# Patient Record
Sex: FEMALE | Race: BLACK OR AFRICAN AMERICAN | Employment: UNEMPLOYED | URBAN - METROPOLITAN AREA
[De-identification: names, ages, dates, MRNs, and addresses within clinical notes are randomized per-mention and may not be internally consistent; named-entity substitution may affect disease eponyms.]

---

## 2018-12-22 ENCOUNTER — HOSPITAL ENCOUNTER (EMERGENCY)
Age: 28
Discharge: HOME | End: 2018-12-22
Attending: EMERGENCY MEDICINE
Payer: MEDICAID

## 2018-12-22 ENCOUNTER — APPOINTMENT (OUTPATIENT)
Dept: GENERAL RADIOLOGY | Age: 28
End: 2018-12-22
Payer: MEDICAID

## 2018-12-22 VITALS
DIASTOLIC BLOOD PRESSURE: 85 MMHG | WEIGHT: 195 LBS | BODY MASS INDEX: 33.29 KG/M2 | HEART RATE: 102 BPM | SYSTOLIC BLOOD PRESSURE: 140 MMHG | HEIGHT: 64 IN | RESPIRATION RATE: 18 BRPM | TEMPERATURE: 99.1 F | OXYGEN SATURATION: 100 %

## 2018-12-22 DIAGNOSIS — T07.XXXA MULTIPLE CONTUSIONS: ICD-10-CM
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
## 2018-12-22 PROCEDURE — 99283 EMERGENCY DEPT VISIT LOW MDM: CPT

## 2018-12-22 PROCEDURE — 6360000002 HC RX W HCPCS: Performed by: EMERGENCY MEDICINE

## 2018-12-22 PROCEDURE — 96372 THER/PROPH/DIAG INJ SC/IM: CPT

## 2018-12-22 PROCEDURE — 6370000000 HC RX 637 (ALT 250 FOR IP): Performed by: EMERGENCY MEDICINE

## 2018-12-22 PROCEDURE — 70150 X-RAY EXAM OF FACIAL BONES: CPT

## 2018-12-22 RX ORDER — ORPHENADRINE CITRATE 30 MG/ML
60 INJECTION INTRAMUSCULAR; INTRAVENOUS ONCE
Status: COMPLETED | OUTPATIENT
Start: 2018-12-22 | End: 2018-12-22

## 2018-12-22 RX ORDER — IBUPROFEN 800 MG/1
800 TABLET ORAL 3 TIMES DAILY PRN
Qty: 21 TABLET | Refills: 1 | Status: SHIPPED | OUTPATIENT
Start: 2018-12-22 | End: 2019-11-22

## 2018-12-22 RX ORDER — HYDROCODONE BITARTRATE AND ACETAMINOPHEN 5; 325 MG/1; MG/1
1 TABLET ORAL EVERY 8 HOURS PRN
Qty: 6 TABLET | Refills: 0 | Status: SHIPPED | OUTPATIENT
Start: 2018-12-22 | End: 2018-12-24

## 2018-12-22 RX ORDER — METHOCARBAMOL 500 MG/1
500 TABLET, FILM COATED ORAL 3 TIMES DAILY
Qty: 21 TABLET | Refills: 0 | Status: SHIPPED | OUTPATIENT
Start: 2018-12-22 | End: 2018-12-29

## 2018-12-22 RX ORDER — HYDROCODONE BITARTRATE AND ACETAMINOPHEN 5; 325 MG/1; MG/1
1 TABLET ORAL ONCE
Status: COMPLETED | OUTPATIENT
Start: 2018-12-22 | End: 2018-12-22

## 2018-12-22 RX ADMIN — ORPHENADRINE CITRATE 60 MG: 30 INJECTION INTRAMUSCULAR; INTRAVENOUS at 14:50

## 2018-12-22 RX ADMIN — HYDROCODONE BITARTRATE AND ACETAMINOPHEN 1 TABLET: 5; 325 TABLET ORAL at 14:48

## 2018-12-22 ASSESSMENT — PAIN DESCRIPTION - PAIN TYPE: TYPE: ACUTE PAIN

## 2018-12-22 ASSESSMENT — PAIN DESCRIPTION - PROGRESSION
CLINICAL_PROGRESSION: NOT CHANGED
CLINICAL_PROGRESSION: GRADUALLY IMPROVING

## 2018-12-22 ASSESSMENT — PAIN SCALES - GENERAL
PAINLEVEL_OUTOF10: 7
PAINLEVEL_OUTOF10: 8
PAINLEVEL_OUTOF10: 8

## 2018-12-22 ASSESSMENT — ENCOUNTER SYMPTOMS
ABDOMINAL PAIN: 0
BACK PAIN: 1
SORE THROAT: 0
COUGH: 0
VOMITING: 0
NAUSEA: 0
DIARRHEA: 0
CONSTIPATION: 0
BLOOD IN STOOL: 0
RHINORRHEA: 0
COLOR CHANGE: 0
SHORTNESS OF BREATH: 0

## 2018-12-22 ASSESSMENT — PAIN DESCRIPTION - FREQUENCY: FREQUENCY: CONTINUOUS

## 2018-12-22 ASSESSMENT — PAIN DESCRIPTION - LOCATION: LOCATION: GENERALIZED

## 2018-12-22 ASSESSMENT — PAIN DESCRIPTION - DESCRIPTORS: DESCRIPTORS: ACHING;SORE
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
## 2019-03-06 ENCOUNTER — HOSPITAL ENCOUNTER (EMERGENCY)
Age: 29
Discharge: HOME | End: 2019-03-06
Attending: EMERGENCY MEDICINE
Payer: MEDICAID

## 2019-03-06 VITALS
OXYGEN SATURATION: 97 % | SYSTOLIC BLOOD PRESSURE: 128 MMHG | HEART RATE: 98 BPM | TEMPERATURE: 98.8 F | DIASTOLIC BLOOD PRESSURE: 82 MMHG | RESPIRATION RATE: 14 BRPM

## 2019-03-06 DIAGNOSIS — T50.901A ACCIDENTAL DRUG OVERDOSE, INITIAL ENCOUNTER: Primary | ICD-10-CM

## 2019-03-06 DIAGNOSIS — F19.10 POLYSUBSTANCE ABUSE (HCC): ICD-10-CM

## 2019-03-06 LAB — GONADOTROPIN, CHORIONIC (HCG) QUANT: <0.1 MIU/ML

## 2019-03-06 PROCEDURE — 99284 EMERGENCY DEPT VISIT MOD MDM: CPT

## 2019-03-06 PROCEDURE — 84702 CHORIONIC GONADOTROPIN TEST: CPT

## 2019-03-06 PROCEDURE — 36415 COLL VENOUS BLD VENIPUNCTURE: CPT

## 2019-11-22 ENCOUNTER — ANESTHESIA EVENT (OUTPATIENT)
Dept: MEDSURG UNIT | Age: 29
End: 2019-11-22

## 2019-11-22 ENCOUNTER — APPOINTMENT (OUTPATIENT)
Dept: GENERAL RADIOLOGY | Age: 29
DRG: 383 | End: 2019-11-22
Payer: MEDICAID

## 2019-11-22 ENCOUNTER — HOSPITAL ENCOUNTER (INPATIENT)
Age: 29
LOS: 3 days | Discharge: HOME | DRG: 383 | End: 2019-11-25
Attending: EMERGENCY MEDICINE | Admitting: INTERNAL MEDICINE
Payer: MEDICAID

## 2019-11-22 ENCOUNTER — ANESTHESIA (OUTPATIENT)
Dept: MEDSURG UNIT | Age: 29
End: 2019-11-22

## 2019-11-22 DIAGNOSIS — L03.119 CELLULITIS OF HAND: Primary | ICD-10-CM

## 2019-11-22 DIAGNOSIS — M65.9 SYNOVITIS AND TENOSYNOVITIS: ICD-10-CM

## 2019-11-22 DIAGNOSIS — M65.9 TENOSYNOVITIS: ICD-10-CM

## 2019-11-22 PROBLEM — M65.90 TENOSYNOVITIS: Status: ACTIVE | Noted: 2019-11-22

## 2019-11-22 LAB
ABO/RH: NORMAL
ANION GAP SERPL CALCULATED.3IONS-SCNC: 11 MMOL/L (ref 7–16)
ANTIBODY SCREEN: NORMAL
BASOPHILS ABSOLUTE: 0.02 E9/L (ref 0–0.2)
BASOPHILS RELATIVE PERCENT: 0.3 % (ref 0–2)
BUN BLDV-MCNC: 13 MG/DL (ref 6–20)
CALCIUM SERPL-MCNC: 9.5 MG/DL (ref 8.6–10.2)
CHLORIDE BLD-SCNC: 101 MMOL/L (ref 98–107)
CO2: 26 MMOL/L (ref 22–29)
CREAT SERPL-MCNC: 0.8 MG/DL (ref 0.5–1)
EOSINOPHILS ABSOLUTE: 0.2 E9/L (ref 0.05–0.5)
EOSINOPHILS RELATIVE PERCENT: 3 % (ref 0–6)
GFR AFRICAN AMERICAN: >60
GFR NON-AFRICAN AMERICAN: >60 ML/MIN/1.73
GLUCOSE BLD-MCNC: 102 MG/DL (ref 74–99)
HCT VFR BLD CALC: 33.3 % (ref 34–48)
HEMOGLOBIN: 10.9 G/DL (ref 11.5–15.5)
IMMATURE GRANULOCYTES #: 0.01 E9/L
IMMATURE GRANULOCYTES %: 0.1 % (ref 0–5)
LYMPHOCYTES ABSOLUTE: 2.42 E9/L (ref 1.5–4)
LYMPHOCYTES RELATIVE PERCENT: 35.8 % (ref 20–42)
MCH RBC QN AUTO: 29.3 PG (ref 26–35)
MCHC RBC AUTO-ENTMCNC: 32.7 % (ref 32–34.5)
MCV RBC AUTO: 89.5 FL (ref 80–99.9)
MONOCYTES ABSOLUTE: 0.65 E9/L (ref 0.1–0.95)
MONOCYTES RELATIVE PERCENT: 9.6 % (ref 2–12)
NEUTROPHILS ABSOLUTE: 3.46 E9/L (ref 1.8–7.3)
NEUTROPHILS RELATIVE PERCENT: 51.2 % (ref 43–80)
PDW BLD-RTO: 15.9 FL (ref 11.5–15)
PLATELET # BLD: 289 E9/L (ref 130–450)
PMV BLD AUTO: 9.5 FL (ref 7–12)
POTASSIUM SERPL-SCNC: 4.5 MMOL/L (ref 3.5–5)
RBC # BLD: 3.72 E12/L (ref 3.5–5.5)
SEDIMENTATION RATE, ERYTHROCYTE: 43 MM/HR (ref 0–20)
SODIUM BLD-SCNC: 138 MMOL/L (ref 132–146)
WBC # BLD: 6.8 E9/L (ref 4.5–11.5)

## 2019-11-22 PROCEDURE — 85025 COMPLETE CBC W/AUTO DIFF WBC: CPT

## 2019-11-22 PROCEDURE — 6360000002 HC RX W HCPCS: Performed by: PHYSICIAN ASSISTANT

## 2019-11-22 PROCEDURE — 6370000000 HC RX 637 (ALT 250 FOR IP): Performed by: INTERNAL MEDICINE

## 2019-11-22 PROCEDURE — 80048 BASIC METABOLIC PNL TOTAL CA: CPT

## 2019-11-22 PROCEDURE — 2580000003 HC RX 258: Performed by: PHYSICIAN ASSISTANT

## 2019-11-22 PROCEDURE — 6360000002 HC RX W HCPCS: Performed by: INTERNAL MEDICINE

## 2019-11-22 PROCEDURE — 71045 X-RAY EXAM CHEST 1 VIEW: CPT

## 2019-11-22 PROCEDURE — 6370000000 HC RX 637 (ALT 250 FOR IP): Performed by: SPECIALIST

## 2019-11-22 PROCEDURE — 73130 X-RAY EXAM OF HAND: CPT

## 2019-11-22 PROCEDURE — 99285 EMERGENCY DEPT VISIT HI MDM: CPT

## 2019-11-22 PROCEDURE — 6370000000 HC RX 637 (ALT 250 FOR IP): Performed by: PHYSICIAN ASSISTANT

## 2019-11-22 PROCEDURE — 86850 RBC ANTIBODY SCREEN: CPT

## 2019-11-22 PROCEDURE — 86901 BLOOD TYPING SEROLOGIC RH(D): CPT

## 2019-11-22 PROCEDURE — 85651 RBC SED RATE NONAUTOMATED: CPT

## 2019-11-22 PROCEDURE — 86900 BLOOD TYPING SEROLOGIC ABO: CPT

## 2019-11-22 PROCEDURE — 36415 COLL VENOUS BLD VENIPUNCTURE: CPT

## 2019-11-22 PROCEDURE — 2580000003 HC RX 258: Performed by: INTERNAL MEDICINE

## 2019-11-22 PROCEDURE — 87081 CULTURE SCREEN ONLY: CPT

## 2019-11-22 PROCEDURE — 96374 THER/PROPH/DIAG INJ IV PUSH: CPT

## 2019-11-22 PROCEDURE — 1200000000 HC SEMI PRIVATE

## 2019-11-22 RX ORDER — HYDROCODONE BITARTRATE AND ACETAMINOPHEN 7.5; 325 MG/1; MG/1
1 TABLET ORAL EVERY 4 HOURS PRN
Status: DISCONTINUED | OUTPATIENT
Start: 2019-11-22 | End: 2019-11-25 | Stop reason: HOSPADM

## 2019-11-22 RX ORDER — ACETAMINOPHEN 325 MG/1
650 TABLET ORAL EVERY 4 HOURS PRN
Status: DISCONTINUED | OUTPATIENT
Start: 2019-11-22 | End: 2019-11-22

## 2019-11-22 RX ORDER — NICOTINE 21 MG/24HR
1 PATCH, TRANSDERMAL 24 HOURS TRANSDERMAL DAILY
Status: DISCONTINUED | OUTPATIENT
Start: 2019-11-22 | End: 2019-11-22

## 2019-11-22 RX ORDER — LINEZOLID 600 MG/1
600 TABLET, FILM COATED ORAL EVERY 12 HOURS SCHEDULED
Status: DISCONTINUED | OUTPATIENT
Start: 2019-11-22 | End: 2019-11-25 | Stop reason: HOSPADM

## 2019-11-22 RX ORDER — SODIUM CHLORIDE 0.9 % (FLUSH) 0.9 %
10 SYRINGE (ML) INJECTION PRN
Status: DISCONTINUED | OUTPATIENT
Start: 2019-11-22 | End: 2019-11-25 | Stop reason: HOSPADM

## 2019-11-22 RX ORDER — SODIUM CHLORIDE 0.9 % (FLUSH) 0.9 %
10 SYRINGE (ML) INJECTION EVERY 12 HOURS SCHEDULED
Status: DISCONTINUED | OUTPATIENT
Start: 2019-11-22 | End: 2019-11-25 | Stop reason: HOSPADM

## 2019-11-22 RX ORDER — KETOROLAC TROMETHAMINE 30 MG/ML
30 INJECTION, SOLUTION INTRAMUSCULAR; INTRAVENOUS EVERY 6 HOURS PRN
Status: DISCONTINUED | OUTPATIENT
Start: 2019-11-22 | End: 2019-11-25 | Stop reason: HOSPADM

## 2019-11-22 RX ORDER — ONDANSETRON 2 MG/ML
4 INJECTION INTRAMUSCULAR; INTRAVENOUS EVERY 6 HOURS PRN
Status: DISCONTINUED | OUTPATIENT
Start: 2019-11-22 | End: 2019-11-25 | Stop reason: HOSPADM

## 2019-11-22 RX ORDER — ACETAMINOPHEN 325 MG/1
650 TABLET ORAL EVERY 4 HOURS PRN
Status: DISCONTINUED | OUTPATIENT
Start: 2019-11-22 | End: 2019-11-25 | Stop reason: HOSPADM

## 2019-11-22 RX ORDER — ONDANSETRON 2 MG/ML
4 INJECTION INTRAMUSCULAR; INTRAVENOUS EVERY 6 HOURS PRN
Status: DISCONTINUED | OUTPATIENT
Start: 2019-11-22 | End: 2019-11-22 | Stop reason: SDUPTHER

## 2019-11-22 RX ORDER — SODIUM CHLORIDE 0.9 % (FLUSH) 0.9 %
10 SYRINGE (ML) INJECTION PRN
Status: DISCONTINUED | OUTPATIENT
Start: 2019-11-22 | End: 2019-11-22

## 2019-11-22 RX ORDER — SODIUM CHLORIDE 0.9 % (FLUSH) 0.9 %
10 SYRINGE (ML) INJECTION EVERY 12 HOURS SCHEDULED
Status: DISCONTINUED | OUTPATIENT
Start: 2019-11-22 | End: 2019-11-22

## 2019-11-22 RX ORDER — HYDROCODONE BITARTRATE AND ACETAMINOPHEN 5; 325 MG/1; MG/1
1 TABLET ORAL ONCE
Status: COMPLETED | OUTPATIENT
Start: 2019-11-22 | End: 2019-11-22

## 2019-11-22 RX ORDER — KETOROLAC TROMETHAMINE 30 MG/ML
30 INJECTION, SOLUTION INTRAMUSCULAR; INTRAVENOUS ONCE
Status: COMPLETED | OUTPATIENT
Start: 2019-11-22 | End: 2019-11-22

## 2019-11-22 RX ADMIN — KETOROLAC TROMETHAMINE 30 MG: 30 INJECTION, SOLUTION INTRAMUSCULAR; INTRAVENOUS at 15:55

## 2019-11-22 RX ADMIN — ONDANSETRON 4 MG: 2 INJECTION INTRAMUSCULAR; INTRAVENOUS at 08:36

## 2019-11-22 RX ADMIN — ACETAMINOPHEN 650 MG: 325 TABLET, FILM COATED ORAL at 21:25

## 2019-11-22 RX ADMIN — ACETAMINOPHEN 650 MG: 325 TABLET, FILM COATED ORAL at 04:25

## 2019-11-22 RX ADMIN — KETOROLAC TROMETHAMINE 30 MG: 30 INJECTION, SOLUTION INTRAMUSCULAR; INTRAVENOUS at 22:14

## 2019-11-22 RX ADMIN — Medication 10 ML: at 08:43

## 2019-11-22 RX ADMIN — HYDROCODONE BITARTRATE AND ACETAMINOPHEN 1 TABLET: 7.5; 325 TABLET ORAL at 17:53

## 2019-11-22 RX ADMIN — VANCOMYCIN HYDROCHLORIDE 1500 MG: 5 INJECTION, POWDER, LYOPHILIZED, FOR SOLUTION INTRAVENOUS at 08:01

## 2019-11-22 RX ADMIN — HYDROCODONE BITARTRATE AND ACETAMINOPHEN 1 TABLET: 7.5; 325 TABLET ORAL at 23:42

## 2019-11-22 RX ADMIN — KETOROLAC TROMETHAMINE 30 MG: 30 INJECTION, SOLUTION INTRAMUSCULAR; INTRAVENOUS at 01:39

## 2019-11-22 RX ADMIN — LINEZOLID 600 MG: 600 TABLET, FILM COATED ORAL at 21:25

## 2019-11-22 RX ADMIN — ONDANSETRON 4 MG: 2 INJECTION INTRAMUSCULAR; INTRAVENOUS at 21:25

## 2019-11-22 RX ADMIN — KETOROLAC TROMETHAMINE 30 MG: 30 INJECTION, SOLUTION INTRAMUSCULAR; INTRAVENOUS at 07:55

## 2019-11-22 RX ADMIN — HYDROCODONE BITARTRATE AND ACETAMINOPHEN 1 TABLET: 7.5; 325 TABLET ORAL at 13:15

## 2019-11-22 RX ADMIN — LINEZOLID 600 MG: 600 TABLET, FILM COATED ORAL at 11:39

## 2019-11-22 RX ADMIN — Medication 10 ML: at 22:00

## 2019-11-22 RX ADMIN — HYDROCODONE BITARTRATE AND ACETAMINOPHEN 1 TABLET: 5; 325 TABLET ORAL at 02:18

## 2019-11-22 RX ADMIN — HYDROCODONE BITARTRATE AND ACETAMINOPHEN 1 TABLET: 7.5; 325 TABLET ORAL at 08:31

## 2019-11-22 ASSESSMENT — PAIN SCALES - GENERAL
PAINLEVEL_OUTOF10: 8
PAINLEVEL_OUTOF10: 10
PAINLEVEL_OUTOF10: 1
PAINLEVEL_OUTOF10: 10
PAINLEVEL_OUTOF10: 6
PAINLEVEL_OUTOF10: 10
PAINLEVEL_OUTOF10: 7
PAINLEVEL_OUTOF10: 9
PAINLEVEL_OUTOF10: 10
PAINLEVEL_OUTOF10: 9
PAINLEVEL_OUTOF10: 8
PAINLEVEL_OUTOF10: 10
PAINLEVEL_OUTOF10: 10

## 2019-11-22 ASSESSMENT — PAIN DESCRIPTION - PROGRESSION
CLINICAL_PROGRESSION: GRADUALLY WORSENING
CLINICAL_PROGRESSION: GRADUALLY WORSENING

## 2019-11-22 ASSESSMENT — PAIN DESCRIPTION - DESCRIPTORS
DESCRIPTORS: SHARP;SHOOTING;STABBING
DESCRIPTORS: BURNING;RADIATING;SHARP

## 2019-11-22 ASSESSMENT — PAIN DESCRIPTION - ORIENTATION
ORIENTATION: RIGHT;LEFT

## 2019-11-22 ASSESSMENT — PAIN DESCRIPTION - LOCATION
LOCATION: HAND
LOCATION: ARM;FINGER (COMMENT WHICH ONE)
LOCATION: HAND

## 2019-11-22 ASSESSMENT — PAIN DESCRIPTION - PAIN TYPE
TYPE: ACUTE PAIN
TYPE: ACUTE PAIN

## 2019-11-22 ASSESSMENT — LIFESTYLE VARIABLES: SMOKING_STATUS: 1

## 2019-11-22 ASSESSMENT — PAIN DESCRIPTION - FREQUENCY
FREQUENCY: CONTINUOUS
FREQUENCY: CONTINUOUS

## 2019-11-22 ASSESSMENT — PAIN DESCRIPTION - ONSET
ONSET: ON-GOING
ONSET: ON-GOING

## 2019-11-22 ASSESSMENT — PAIN - FUNCTIONAL ASSESSMENT: PAIN_FUNCTIONAL_ASSESSMENT: PREVENTS OR INTERFERES SOME ACTIVE ACTIVITIES AND ADLS

## 2019-11-23 LAB
ALBUMIN SERPL-MCNC: 3.3 G/DL (ref 3.5–5.2)
ALP BLD-CCNC: 65 U/L (ref 35–104)
ALT SERPL-CCNC: 13 U/L (ref 0–32)
ANION GAP SERPL CALCULATED.3IONS-SCNC: 10 MMOL/L (ref 7–16)
ANISOCYTOSIS: ABNORMAL
ANTISTREPTOLYSIN-O: 424 IU/ML (ref 0–200)
APTT: 34.8 SEC (ref 24.5–35.1)
AST SERPL-CCNC: 18 U/L (ref 0–31)
ATYPICAL LYMPHOCYTE RELATIVE PERCENT: 0.9 % (ref 0–4)
BASOPHILS ABSOLUTE: 0 E9/L (ref 0–0.2)
BASOPHILS RELATIVE PERCENT: 0.2 % (ref 0–2)
BILIRUB SERPL-MCNC: <0.2 MG/DL (ref 0–1.2)
BUN BLDV-MCNC: 16 MG/DL (ref 6–20)
BURR CELLS: ABNORMAL
CALCIUM SERPL-MCNC: 8.8 MG/DL (ref 8.6–10.2)
CHLORIDE BLD-SCNC: 104 MMOL/L (ref 98–107)
CHOLESTEROL, TOTAL: 87 MG/DL (ref 0–199)
CO2: 24 MMOL/L (ref 22–29)
CREAT SERPL-MCNC: 0.9 MG/DL (ref 0.5–1)
EOSINOPHILS ABSOLUTE: 0.2 E9/L (ref 0.05–0.5)
EOSINOPHILS RELATIVE PERCENT: 3.5 % (ref 0–6)
GFR AFRICAN AMERICAN: >60
GFR NON-AFRICAN AMERICAN: >60 ML/MIN/1.73
GLUCOSE BLD-MCNC: 109 MG/DL (ref 74–99)
HCT VFR BLD CALC: 30.8 % (ref 34–48)
HDLC SERPL-MCNC: 40 MG/DL
HEMOGLOBIN: 10.1 G/DL (ref 11.5–15.5)
HYPOCHROMIA: ABNORMAL
INR BLD: 1.1
LDL CHOLESTEROL CALCULATED: 35 MG/DL (ref 0–99)
LYMPHOCYTES ABSOLUTE: 1.94 E9/L (ref 1.5–4)
LYMPHOCYTES RELATIVE PERCENT: 32.7 % (ref 20–42)
MCH RBC QN AUTO: 29.6 PG (ref 26–35)
MCHC RBC AUTO-ENTMCNC: 32.8 % (ref 32–34.5)
MCV RBC AUTO: 90.3 FL (ref 80–99.9)
MONOCYTES ABSOLUTE: 0.51 E9/L (ref 0.1–0.95)
MONOCYTES RELATIVE PERCENT: 8.8 % (ref 2–12)
NEUTROPHILS ABSOLUTE: 3.08 E9/L (ref 1.8–7.3)
NEUTROPHILS RELATIVE PERCENT: 54 % (ref 43–80)
OVALOCYTES: ABNORMAL
PDW BLD-RTO: 16 FL (ref 11.5–15)
PLATELET # BLD: 257 E9/L (ref 130–450)
PMV BLD AUTO: 9.6 FL (ref 7–12)
POIKILOCYTES: ABNORMAL
POLYCHROMASIA: ABNORMAL
POTASSIUM SERPL-SCNC: 4.4 MMOL/L (ref 3.5–5)
PROTHROMBIN TIME: 12.9 SEC (ref 9.3–12.4)
RBC # BLD: 3.41 E12/L (ref 3.5–5.5)
SCHISTOCYTES: ABNORMAL
SODIUM BLD-SCNC: 138 MMOL/L (ref 132–146)
TARGET CELLS: ABNORMAL
TOTAL PROTEIN: 6.6 G/DL (ref 6.4–8.3)
TRIGL SERPL-MCNC: 58 MG/DL (ref 0–149)
TSH SERPL DL<=0.05 MIU/L-ACNC: 0.35 UIU/ML (ref 0.27–4.2)
VLDLC SERPL CALC-MCNC: 12 MG/DL
WBC # BLD: 5.7 E9/L (ref 4.5–11.5)

## 2019-11-23 PROCEDURE — 6360000002 HC RX W HCPCS: Performed by: STUDENT IN AN ORGANIZED HEALTH CARE EDUCATION/TRAINING PROGRAM

## 2019-11-23 PROCEDURE — 6360000002 HC RX W HCPCS: Performed by: INTERNAL MEDICINE

## 2019-11-23 PROCEDURE — 86060 ANTISTREPTOLYSIN O TITER: CPT

## 2019-11-23 PROCEDURE — 85025 COMPLETE CBC W/AUTO DIFF WBC: CPT

## 2019-11-23 PROCEDURE — 85610 PROTHROMBIN TIME: CPT

## 2019-11-23 PROCEDURE — 1200000000 HC SEMI PRIVATE

## 2019-11-23 PROCEDURE — 6370000000 HC RX 637 (ALT 250 FOR IP): Performed by: SPECIALIST

## 2019-11-23 PROCEDURE — 85730 THROMBOPLASTIN TIME PARTIAL: CPT

## 2019-11-23 PROCEDURE — 6370000000 HC RX 637 (ALT 250 FOR IP): Performed by: INTERNAL MEDICINE

## 2019-11-23 PROCEDURE — 2580000003 HC RX 258: Performed by: INTERNAL MEDICINE

## 2019-11-23 PROCEDURE — 80061 LIPID PANEL: CPT

## 2019-11-23 PROCEDURE — 36415 COLL VENOUS BLD VENIPUNCTURE: CPT

## 2019-11-23 PROCEDURE — 84443 ASSAY THYROID STIM HORMONE: CPT

## 2019-11-23 PROCEDURE — 80053 COMPREHEN METABOLIC PANEL: CPT

## 2019-11-23 PROCEDURE — 2580000003 HC RX 258: Performed by: STUDENT IN AN ORGANIZED HEALTH CARE EDUCATION/TRAINING PROGRAM

## 2019-11-23 PROCEDURE — 93005 ELECTROCARDIOGRAM TRACING: CPT

## 2019-11-23 RX ADMIN — LINEZOLID 600 MG: 600 TABLET, FILM COATED ORAL at 20:23

## 2019-11-23 RX ADMIN — KETOROLAC TROMETHAMINE 30 MG: 30 INJECTION, SOLUTION INTRAMUSCULAR; INTRAVENOUS at 15:38

## 2019-11-23 RX ADMIN — Medication 10 ML: at 15:38

## 2019-11-23 RX ADMIN — KETOROLAC TROMETHAMINE 30 MG: 30 INJECTION, SOLUTION INTRAMUSCULAR; INTRAVENOUS at 06:23

## 2019-11-23 RX ADMIN — HYDROCODONE BITARTRATE AND ACETAMINOPHEN 1 TABLET: 7.5; 325 TABLET ORAL at 19:28

## 2019-11-23 RX ADMIN — LINEZOLID 600 MG: 600 TABLET, FILM COATED ORAL at 10:04

## 2019-11-23 RX ADMIN — HYDROCODONE BITARTRATE AND ACETAMINOPHEN 1 TABLET: 7.5; 325 TABLET ORAL at 14:12

## 2019-11-23 RX ADMIN — AMPICILLIN SODIUM AND SULBACTAM SODIUM 3 G: 1; .5 INJECTION, POWDER, FOR SOLUTION INTRAMUSCULAR; INTRAVENOUS at 17:40

## 2019-11-23 RX ADMIN — HYDROCODONE BITARTRATE AND ACETAMINOPHEN 1 TABLET: 7.5; 325 TABLET ORAL at 08:38

## 2019-11-23 RX ADMIN — AMPICILLIN SODIUM AND SULBACTAM SODIUM 3 G: 1; .5 INJECTION, POWDER, FOR SOLUTION INTRAMUSCULAR; INTRAVENOUS at 11:56

## 2019-11-23 ASSESSMENT — PAIN DESCRIPTION - ORIENTATION: ORIENTATION: RIGHT;LEFT

## 2019-11-23 ASSESSMENT — PAIN SCALES - GENERAL
PAINLEVEL_OUTOF10: 10
PAINLEVEL_OUTOF10: 8
PAINLEVEL_OUTOF10: 7
PAINLEVEL_OUTOF10: 9
PAINLEVEL_OUTOF10: 7
PAINLEVEL_OUTOF10: 7
PAINLEVEL_OUTOF10: 10

## 2019-11-23 ASSESSMENT — PAIN DESCRIPTION - PAIN TYPE: TYPE: ACUTE PAIN

## 2019-11-23 ASSESSMENT — PAIN DESCRIPTION - LOCATION: LOCATION: FINGER (COMMENT WHICH ONE);HAND

## 2019-11-23 ASSESSMENT — PAIN DESCRIPTION - DESCRIPTORS: DESCRIPTORS: THROBBING

## 2019-11-24 LAB — MRSA CULTURE ONLY: NORMAL

## 2019-11-24 PROCEDURE — 6370000000 HC RX 637 (ALT 250 FOR IP): Performed by: SPECIALIST

## 2019-11-24 PROCEDURE — 1200000000 HC SEMI PRIVATE

## 2019-11-24 PROCEDURE — 6360000002 HC RX W HCPCS: Performed by: INTERNAL MEDICINE

## 2019-11-24 PROCEDURE — 6370000000 HC RX 637 (ALT 250 FOR IP): Performed by: INTERNAL MEDICINE

## 2019-11-24 PROCEDURE — 2580000003 HC RX 258: Performed by: INTERNAL MEDICINE

## 2019-11-24 PROCEDURE — 2580000003 HC RX 258: Performed by: STUDENT IN AN ORGANIZED HEALTH CARE EDUCATION/TRAINING PROGRAM

## 2019-11-24 PROCEDURE — 6360000002 HC RX W HCPCS: Performed by: STUDENT IN AN ORGANIZED HEALTH CARE EDUCATION/TRAINING PROGRAM

## 2019-11-24 RX ORDER — NICOTINE 21 MG/24HR
1 PATCH, TRANSDERMAL 24 HOURS TRANSDERMAL DAILY
Status: DISCONTINUED | OUTPATIENT
Start: 2019-11-24 | End: 2019-11-25 | Stop reason: HOSPADM

## 2019-11-24 RX ADMIN — LINEZOLID 600 MG: 600 TABLET, FILM COATED ORAL at 21:46

## 2019-11-24 RX ADMIN — KETOROLAC TROMETHAMINE 30 MG: 30 INJECTION, SOLUTION INTRAMUSCULAR; INTRAVENOUS at 19:05

## 2019-11-24 RX ADMIN — AMPICILLIN SODIUM AND SULBACTAM SODIUM 3 G: 1; .5 INJECTION, POWDER, FOR SOLUTION INTRAMUSCULAR; INTRAVENOUS at 00:16

## 2019-11-24 RX ADMIN — LINEZOLID 600 MG: 600 TABLET, FILM COATED ORAL at 10:16

## 2019-11-24 RX ADMIN — AMPICILLIN SODIUM AND SULBACTAM SODIUM 3 G: 1; .5 INJECTION, POWDER, FOR SOLUTION INTRAMUSCULAR; INTRAVENOUS at 14:22

## 2019-11-24 RX ADMIN — Medication 10 ML: at 01:37

## 2019-11-24 RX ADMIN — AMPICILLIN SODIUM AND SULBACTAM SODIUM 3 G: 1; .5 INJECTION, POWDER, FOR SOLUTION INTRAMUSCULAR; INTRAVENOUS at 06:10

## 2019-11-24 RX ADMIN — HYDROCODONE BITARTRATE AND ACETAMINOPHEN 1 TABLET: 7.5; 325 TABLET ORAL at 10:21

## 2019-11-24 RX ADMIN — HYDROCODONE BITARTRATE AND ACETAMINOPHEN 1 TABLET: 7.5; 325 TABLET ORAL at 23:24

## 2019-11-24 RX ADMIN — HYDROCODONE BITARTRATE AND ACETAMINOPHEN 1 TABLET: 7.5; 325 TABLET ORAL at 18:50

## 2019-11-24 RX ADMIN — Medication 10 ML: at 14:22

## 2019-11-24 RX ADMIN — KETOROLAC TROMETHAMINE 30 MG: 30 INJECTION, SOLUTION INTRAMUSCULAR; INTRAVENOUS at 01:34

## 2019-11-24 RX ADMIN — HYDROCODONE BITARTRATE AND ACETAMINOPHEN 1 TABLET: 7.5; 325 TABLET ORAL at 01:34

## 2019-11-24 RX ADMIN — HYDROCODONE BITARTRATE AND ACETAMINOPHEN 1 TABLET: 7.5; 325 TABLET ORAL at 14:22

## 2019-11-24 RX ADMIN — AMPICILLIN SODIUM AND SULBACTAM SODIUM 3 G: 1; .5 INJECTION, POWDER, FOR SOLUTION INTRAMUSCULAR; INTRAVENOUS at 23:24

## 2019-11-24 ASSESSMENT — PAIN SCALES - GENERAL
PAINLEVEL_OUTOF10: 7
PAINLEVEL_OUTOF10: 8
PAINLEVEL_OUTOF10: 3
PAINLEVEL_OUTOF10: 8
PAINLEVEL_OUTOF10: 3
PAINLEVEL_OUTOF10: 4
PAINLEVEL_OUTOF10: 7
PAINLEVEL_OUTOF10: 9
PAINLEVEL_OUTOF10: 9

## 2019-11-24 ASSESSMENT — PAIN DESCRIPTION - ONSET: ONSET: GRADUAL

## 2019-11-24 ASSESSMENT — PAIN DESCRIPTION - PROGRESSION: CLINICAL_PROGRESSION: NOT CHANGED

## 2019-11-24 ASSESSMENT — PAIN DESCRIPTION - LOCATION
LOCATION: HAND
LOCATION: HAND

## 2019-11-24 ASSESSMENT — PAIN DESCRIPTION - PAIN TYPE
TYPE: ACUTE PAIN
TYPE: ACUTE PAIN

## 2019-11-24 ASSESSMENT — PAIN DESCRIPTION - DESCRIPTORS
DESCRIPTORS: DISCOMFORT;SORE
DESCRIPTORS: ACHING;DISCOMFORT

## 2019-11-24 ASSESSMENT — PAIN DESCRIPTION - ORIENTATION
ORIENTATION: RIGHT;LEFT
ORIENTATION: RIGHT;LEFT

## 2019-11-24 ASSESSMENT — PAIN DESCRIPTION - FREQUENCY
FREQUENCY: INTERMITTENT
FREQUENCY: CONTINUOUS

## 2019-11-24 ASSESSMENT — PAIN - FUNCTIONAL ASSESSMENT: PAIN_FUNCTIONAL_ASSESSMENT: ACTIVITIES ARE NOT PREVENTED

## 2019-11-25 VITALS
RESPIRATION RATE: 16 BRPM | DIASTOLIC BLOOD PRESSURE: 69 MMHG | TEMPERATURE: 97.6 F | BODY MASS INDEX: 34.15 KG/M2 | HEIGHT: 64 IN | OXYGEN SATURATION: 99 % | HEART RATE: 74 BPM | WEIGHT: 200 LBS | SYSTOLIC BLOOD PRESSURE: 116 MMHG

## 2019-11-25 PROCEDURE — 6360000002 HC RX W HCPCS: Performed by: STUDENT IN AN ORGANIZED HEALTH CARE EDUCATION/TRAINING PROGRAM

## 2019-11-25 PROCEDURE — 6370000000 HC RX 637 (ALT 250 FOR IP): Performed by: INTERNAL MEDICINE

## 2019-11-25 PROCEDURE — 2580000003 HC RX 258: Performed by: STUDENT IN AN ORGANIZED HEALTH CARE EDUCATION/TRAINING PROGRAM

## 2019-11-25 PROCEDURE — 2580000003 HC RX 258: Performed by: INTERNAL MEDICINE

## 2019-11-25 PROCEDURE — 6360000002 HC RX W HCPCS: Performed by: INTERNAL MEDICINE

## 2019-11-25 PROCEDURE — 6370000000 HC RX 637 (ALT 250 FOR IP): Performed by: SPECIALIST

## 2019-11-25 RX ORDER — HYDROCODONE BITARTRATE AND ACETAMINOPHEN 7.5; 325 MG/1; MG/1
1 TABLET ORAL EVERY 4 HOURS PRN
Qty: 42 TABLET | Refills: 0 | Status: SHIPPED | OUTPATIENT
Start: 2019-11-25 | End: 2019-12-02

## 2019-11-25 RX ORDER — LINEZOLID 600 MG/1
600 TABLET, FILM COATED ORAL EVERY 12 HOURS SCHEDULED
Qty: 28 TABLET | Refills: 0 | Status: SHIPPED | OUTPATIENT
Start: 2019-11-25 | End: 2019-12-09

## 2019-11-25 RX ADMIN — HYDROCODONE BITARTRATE AND ACETAMINOPHEN 1 TABLET: 7.5; 325 TABLET ORAL at 13:14

## 2019-11-25 RX ADMIN — Medication 10 ML: at 08:43

## 2019-11-25 RX ADMIN — KETOROLAC TROMETHAMINE 30 MG: 30 INJECTION, SOLUTION INTRAMUSCULAR; INTRAVENOUS at 13:14

## 2019-11-25 RX ADMIN — HYDROCODONE BITARTRATE AND ACETAMINOPHEN 1 TABLET: 7.5; 325 TABLET ORAL at 08:42

## 2019-11-25 RX ADMIN — LINEZOLID 600 MG: 600 TABLET, FILM COATED ORAL at 08:42

## 2019-11-25 RX ADMIN — AMPICILLIN SODIUM AND SULBACTAM SODIUM 3 G: 1; .5 INJECTION, POWDER, FOR SOLUTION INTRAMUSCULAR; INTRAVENOUS at 06:23

## 2019-11-25 ASSESSMENT — PAIN SCALES - GENERAL
PAINLEVEL_OUTOF10: 0
PAINLEVEL_OUTOF10: 2
PAINLEVEL_OUTOF10: 8
PAINLEVEL_OUTOF10: 6

## 2019-11-25 ASSESSMENT — PAIN DESCRIPTION - DESCRIPTORS: DESCRIPTORS: DISCOMFORT;SORE

## 2019-11-25 ASSESSMENT — PAIN DESCRIPTION - LOCATION: LOCATION: HAND

## 2019-11-25 ASSESSMENT — PAIN DESCRIPTION - PAIN TYPE: TYPE: ACUTE PAIN

## 2019-11-25 ASSESSMENT — PAIN DESCRIPTION - FREQUENCY: FREQUENCY: INTERMITTENT

## 2019-11-25 ASSESSMENT — PAIN DESCRIPTION - ORIENTATION: ORIENTATION: RIGHT;LEFT

## 2019-11-28 LAB
EKG ATRIAL RATE: 76 BPM
EKG P AXIS: 28 DEGREES
EKG P-R INTERVAL: 166 MS
EKG Q-T INTERVAL: 378 MS
EKG QRS DURATION: 72 MS
EKG QTC CALCULATION (BAZETT): 425 MS
EKG R AXIS: 57 DEGREES
EKG T AXIS: 27 DEGREES
EKG VENTRICULAR RATE: 76 BPM

## 2021-02-08 ENCOUNTER — HOSPITAL ENCOUNTER (OUTPATIENT)
Age: 31
Discharge: HOME | End: 2021-02-08
Attending: OBSTETRICS & GYNECOLOGY | Admitting: OBSTETRICS & GYNECOLOGY
Payer: MEDICAID

## 2021-02-08 VITALS
TEMPERATURE: 98.3 F | DIASTOLIC BLOOD PRESSURE: 65 MMHG | SYSTOLIC BLOOD PRESSURE: 116 MMHG | RESPIRATION RATE: 16 BRPM | HEART RATE: 82 BPM

## 2021-02-08 PROBLEM — Z34.90 NORMAL PREGNANCY, ANTEPARTUM: Status: ACTIVE | Noted: 2021-02-08

## 2021-02-08 LAB
ALBUMIN SERPL-MCNC: 3.3 G/DL (ref 3.5–5.2)
ALP BLD-CCNC: 73 U/L (ref 35–104)
ALT SERPL-CCNC: 11 U/L (ref 0–32)
AMPHETAMINE SCREEN, URINE: NOT DETECTED
ANION GAP SERPL CALCULATED.3IONS-SCNC: 9 MMOL/L (ref 7–16)
AST SERPL-CCNC: 17 U/L (ref 0–31)
BARBITURATE SCREEN URINE: NOT DETECTED
BASOPHILS ABSOLUTE: 0.01 E9/L (ref 0–0.2)
BASOPHILS RELATIVE PERCENT: 0.1 % (ref 0–2)
BENZODIAZEPINE SCREEN, URINE: NOT DETECTED
BILIRUB SERPL-MCNC: <0.2 MG/DL (ref 0–1.2)
BUN BLDV-MCNC: 6 MG/DL (ref 6–20)
CALCIUM SERPL-MCNC: 9.2 MG/DL (ref 8.6–10.2)
CANNABINOID SCREEN URINE: NOT DETECTED
CHLORIDE BLD-SCNC: 104 MMOL/L (ref 98–107)
CO2: 22 MMOL/L (ref 22–29)
COCAINE METABOLITE SCREEN URINE: NOT DETECTED
CREAT SERPL-MCNC: 0.5 MG/DL (ref 0.5–1)
EOSINOPHILS ABSOLUTE: 0.06 E9/L (ref 0.05–0.5)
EOSINOPHILS RELATIVE PERCENT: 0.7 % (ref 0–6)
FENTANYL SCREEN, URINE: POSITIVE
GFR AFRICAN AMERICAN: >60
GFR NON-AFRICAN AMERICAN: >60 ML/MIN/1.73
GLUCOSE BLD-MCNC: 71 MG/DL (ref 74–99)
HCT VFR BLD CALC: 29.4 % (ref 34–48)
HEMOGLOBIN: 9.8 G/DL (ref 11.5–15.5)
IMMATURE GRANULOCYTES #: 0.04 E9/L
IMMATURE GRANULOCYTES %: 0.5 % (ref 0–5)
LYMPHOCYTES ABSOLUTE: 1.71 E9/L (ref 1.5–4)
LYMPHOCYTES RELATIVE PERCENT: 20.3 % (ref 20–42)
Lab: ABNORMAL
MCH RBC QN AUTO: 32.2 PG (ref 26–35)
MCHC RBC AUTO-ENTMCNC: 33.3 % (ref 32–34.5)
MCV RBC AUTO: 96.7 FL (ref 80–99.9)
METHADONE SCREEN, URINE: POSITIVE
MONOCYTES ABSOLUTE: 0.95 E9/L (ref 0.1–0.95)
MONOCYTES RELATIVE PERCENT: 11.3 % (ref 2–12)
NEUTROPHILS ABSOLUTE: 5.65 E9/L (ref 1.8–7.3)
NEUTROPHILS RELATIVE PERCENT: 67.1 % (ref 43–80)
OPIATE SCREEN URINE: NOT DETECTED
OXYCODONE URINE: NOT DETECTED
PDW BLD-RTO: 12.5 FL (ref 11.5–15)
PHENCYCLIDINE SCREEN URINE: NOT DETECTED
PLATELET # BLD: 214 E9/L (ref 130–450)
PMV BLD AUTO: 10.7 FL (ref 7–12)
POTASSIUM SERPL-SCNC: 4 MMOL/L (ref 3.5–5)
RBC # BLD: 3.04 E12/L (ref 3.5–5.5)
SARS-COV-2, NAAT: NOT DETECTED
SODIUM BLD-SCNC: 135 MMOL/L (ref 132–146)
TOTAL PROTEIN: 6.6 G/DL (ref 6.4–8.3)
WBC # BLD: 8.4 E9/L (ref 4.5–11.5)

## 2021-02-08 PROCEDURE — 99212 OFFICE O/P EST SF 10 MIN: CPT

## 2021-02-08 PROCEDURE — 99226 PR SBSQ OBSERVATION CARE/DAY 35 MINUTES: CPT | Performed by: PHYSICIAN ASSISTANT

## 2021-02-08 PROCEDURE — 85025 COMPLETE CBC W/AUTO DIFF WBC: CPT

## 2021-02-08 PROCEDURE — U0002 COVID-19 LAB TEST NON-CDC: HCPCS

## 2021-02-08 PROCEDURE — 2580000003 HC RX 258: Performed by: OBSTETRICS & GYNECOLOGY

## 2021-02-08 PROCEDURE — 36415 COLL VENOUS BLD VENIPUNCTURE: CPT

## 2021-02-08 PROCEDURE — 6370000000 HC RX 637 (ALT 250 FOR IP): Performed by: OBSTETRICS & GYNECOLOGY

## 2021-02-08 PROCEDURE — G0480 DRUG TEST DEF 1-7 CLASSES: HCPCS

## 2021-02-08 PROCEDURE — 80053 COMPREHEN METABOLIC PANEL: CPT

## 2021-02-08 PROCEDURE — 80307 DRUG TEST PRSMV CHEM ANLYZR: CPT

## 2021-02-08 RX ORDER — SODIUM CHLORIDE, SODIUM LACTATE, POTASSIUM CHLORIDE, CALCIUM CHLORIDE 600; 310; 30; 20 MG/100ML; MG/100ML; MG/100ML; MG/100ML
INJECTION, SOLUTION INTRAVENOUS CONTINUOUS
Status: DISCONTINUED | OUTPATIENT
Start: 2021-02-08 | End: 2021-02-08 | Stop reason: HOSPADM

## 2021-02-08 RX ORDER — PROMETHAZINE HYDROCHLORIDE 25 MG/1
25 TABLET ORAL EVERY 6 HOURS PRN
Status: ON HOLD | COMMUNITY
End: 2021-04-21 | Stop reason: HOSPADM

## 2021-02-08 RX ORDER — SODIUM CHLORIDE, SODIUM LACTATE, POTASSIUM CHLORIDE, AND CALCIUM CHLORIDE .6; .31; .03; .02 G/100ML; G/100ML; G/100ML; G/100ML
500 INJECTION, SOLUTION INTRAVENOUS ONCE
Status: COMPLETED | OUTPATIENT
Start: 2021-02-08 | End: 2021-02-08

## 2021-02-08 RX ORDER — METHADONE HYDROCHLORIDE 10 MG/5ML
45 SOLUTION ORAL 2 TIMES DAILY
Status: ON HOLD | COMMUNITY
End: 2021-04-21 | Stop reason: HOSPADM

## 2021-02-08 RX ORDER — ONDANSETRON 4 MG/1
4 TABLET, FILM COATED ORAL ONCE
Status: COMPLETED | OUTPATIENT
Start: 2021-02-08 | End: 2021-02-08

## 2021-02-08 RX ADMIN — ONDANSETRON HYDROCHLORIDE 4 MG: 4 TABLET, FILM COATED ORAL at 15:45

## 2021-02-08 RX ADMIN — SODIUM CHLORIDE, POTASSIUM CHLORIDE, SODIUM LACTATE AND CALCIUM CHLORIDE 500 ML: 600; 310; 30; 20 INJECTION, SOLUTION INTRAVENOUS at 15:37

## 2021-02-08 RX ADMIN — SODIUM CHLORIDE, POTASSIUM CHLORIDE, SODIUM LACTATE AND CALCIUM CHLORIDE: 600; 310; 30; 20 INJECTION, SOLUTION INTRAVENOUS at 17:37

## 2021-02-08 NOTE — PROGRESS NOTES
28w2d presents to antepartum with complaints of constant nausea, vomiting, and diarrhea since yesterday, states she took one prometheazine tablet and it helped yesterday, took 1/2 tab this morning and it did not help. Pt states she is living at [address] [place] [address] for drug abuse, on methadone, threw AM dose up, states she last used fentanyl on  before being admitted to [facility]. Denies any bleeding, ctx/cramping, or leaking of fluid, and states much fetal movement. Placed on efm, PA aware.

## 2021-02-08 NOTE — PROGRESS NOTES
RN continuously at the bedside attempting to continuously trace EFM. Pt laying on side, much fetal movement and hiccups noted.

## 2021-02-08 NOTE — PROGRESS NOTES
[facility] notified of pt's discharge and nightly methadone NOT given. Informed of UDS. States someone will be here to pick pt up.

## 2021-02-08 NOTE — PROGRESS NOTES
Dr Tamera Aase returned page, informed of fhr tracing/ctx pattern, and all lab results including UDS + for methadone which pt takes at [facility] and +fentyanl which pt admits to using but on 1/26/21. States pt can be discharged back to [facility].

## 2021-02-08 NOTE — H&P
Department of Obstetrics and Gynecology  Physician Assistant Obstetrics History and Physical      HISTORY OF PRESENT ILLNESS:      The patient is a 31 y.o.  5 parity 4 at 29 weeks' 2 days' gestation presents to L&D from [facility] due to nausea, vomiting, and diarrhea since 03:00 am. She reports her symptoms as constant but is asking to eat. 3 emesis today;  last emesis was at 11:30 am when she tried to eat a ham and cheese sandwich and water. 3 episodes of watery diarrhea, last at 10:00 am. She is prescribed Phenergan, threw up 12.5 mg dose earlier. She states she took 25 mg yesterday that worked. She denies fever, chills, cough, epigastric pain, chest pain. Last admitted drug use \"Fentanyl on 2021\". She has been at [place] [address] since 2021 and tested Covid negative that day. She denies abdominal pain, cramping, contractions. Current obstetric history is significant for:  4 term SVDs  History of Heroin and Fentanyl abuse, on Methadone    Estimated Due Date:  2021  Contractions: No  Leaking of fluid: No  Bleeding:  No  Perceived fetal movement: Good        PAST OB HISTORY:  OB History    Para Term  AB Living   5 4 4   0 4   SAB TAB Ectopic Molar Multiple Live Births   0         4      # Outcome Date GA Lbr Kostas/2nd Weight Sex Delivery Anes PTL Lv   5 Current            4 Term 16    F Vag-Spont   MELLO   3 Term 14 39w0d  6 lb (2.722 kg) M Vag-Spont   MELLO   2 Term 10/06/13 39w0d  5 lb (2.268 kg) F Vag-Spont   MELLO   1 Term 12/15/10 39w0d  6 lb 13 oz (3.09 kg) M Vag-Spont EPI N MELLO      Obstetric Comments   4 children 9, 6, 5, 3           Pre-eclampsia:  No      :  No      D & C:  No       Cerclage:  No      LEEP:  No      Myomectomy:  No       Labor: No    Past Medical History:    Diagnosis Date    Anemia     Drug abuse ([facility] [address]     Obesity     Overdose     multiple        Past Surgical History:    History reviewed. No pertinent surgical history. Social History:    Reports that she has been smoking cigarettes. She has a 1.30 pack-year smoking history. She has never used smokeless tobacco. She reports previous drug use. Drug: Methamphetamines. She reports that she does not drink alcohol. Medications Prior to Admission:  Medications Prior to Admission: methadone 10 MG/5ML solution, Take 45 mg by mouth 2 times daily. promethazine (PHENERGAN) 25 MG tablet, Take 25 mg by mouth every 6 hours as needed for Nausea    Allergies:  Patient has no known allergies. ROS:  Const: No fever, chills, night sweats, no recent unexplained weight gain/loss  HEENT: No blurred vision, double vision; no ear problems; no sore throat, congestion; no running nose. Resp: No cough, no sputum, no pleuritic chest pain, no sob  Cardio: No chest pain, no exertional dyspnea, no PND, no orthopnea, no palpitation, no leg swelling. GI: (+) n/v/d. No dysphagia, no reflux; no abdominal pain.  No hematochezia    : No dysuria, no frequency, hesitancy; no hematuria  MSK: no joint pain, no myalgia, no change in ROM  Neuro: no focal weakness in extremities, no slurred speech, no double vision, no numbness or tingling in extremities  Endo: no heat/cold intolerance, no polyphagia, polydipsia or polyuria  Hem: no increased bleeding, no bruising, no lymphadenopathy  Skin: no skin changes  Psych: no depressed mood, no suicidal ideation  Pertinent +'s & -'s addressed in HPI    PHYSICAL EXAM:  /65   Pulse 82   Temp 98.3 °F (36.8 °C) (Oral)   Resp 16     General appearance: Comfortable  Lungs:  CTA bilaterally, good excursion  Heart:  Regular Rate & Rhythm, no murmur noted  Abdomen:  Soft, non-tender, gravid  Uterus: soft, nontender  Fetal heart rate:  Baseline Heart Rate 140; variability: moderate;  accelerations: appropriate for GA  Cervix: deferred  Presentation: deferred  Contraction frequency: none  Membranes:  Intact  Extremities: (-) edema       ASSESSMENT:   32 yo  IUP at 28 weeks' 2 days' gestation    N/v/d         Plan: I discussed above with Dr. Abril Swenson.  Orders per him:  EFM  Outpatient  CBC  CMP  Covid-19 rapid  UDS  IV hydration with LR, 500 cc bolus, then 150 ccs/hour  Zofran 4 mg IV x 1      Electronically signed by Meggan Vega PA-C on 2/8/2021 at 2:38 PM

## 2021-02-09 NOTE — PROGRESS NOTES
Ride here from [facility]. Discharge instructions given to the patient at the bedside. Pt verbalizes understanding of all instructions including when to return to hospital and/or call provider and to keep all appointments, pt denies any questions and exits ambulatory by self in good condition.

## 2021-02-12 LAB
EDDP, URINE: >5000 NG/ML
FENTANYL URINE: <1 NG/ML
METHADONE, URINE: 3250 NG/ML
NORFENTANYL, URINE: 6 NG/ML

## 2021-02-16 ENCOUNTER — ROUTINE PRENATAL (OUTPATIENT)
Dept: OBGYN CLINIC | Age: 31
End: 2021-02-16
Payer: MEDICAID

## 2021-02-16 ENCOUNTER — ANCILLARY PROCEDURE (OUTPATIENT)
Dept: OBGYN CLINIC | Age: 31
End: 2021-02-16
Payer: MEDICAID

## 2021-02-16 VITALS
HEART RATE: 81 BPM | BODY MASS INDEX: 39.91 KG/M2 | TEMPERATURE: 97.1 F | SYSTOLIC BLOOD PRESSURE: 102 MMHG | DIASTOLIC BLOOD PRESSURE: 59 MMHG | WEIGHT: 233.8 LBS | HEIGHT: 64 IN

## 2021-02-16 DIAGNOSIS — Z36.3 ENCOUNTER FOR ANTENATAL SCREENING FOR MALFORMATION USING ULTRASOUND: ICD-10-CM

## 2021-02-16 DIAGNOSIS — Z3A.29 29 WEEKS GESTATION OF PREGNANCY: ICD-10-CM

## 2021-02-16 PROCEDURE — 76805 OB US >/= 14 WKS SNGL FETUS: CPT | Performed by: OBSTETRICS & GYNECOLOGY

## 2021-02-16 PROCEDURE — 99999 PR OFFICE/OUTPT VISIT,PROCEDURE ONLY: CPT | Performed by: OBSTETRICS & GYNECOLOGY

## 2021-02-16 PROCEDURE — 99203 OFFICE O/P NEW LOW 30 MIN: CPT | Performed by: OBSTETRICS & GYNECOLOGY

## 2021-02-16 RX ORDER — CLONIDINE HYDROCHLORIDE 0.1 MG/1
TABLET ORAL
Status: ON HOLD | COMMUNITY
Start: 2021-02-07 | End: 2021-04-21 | Stop reason: HOSPADM

## 2021-02-16 NOTE — PATIENT INSTRUCTIONS
that match your comfort and desire. Use the tips provided in this care sheet to find ways to be sexual in your own way. Follow-up care is a key part of your treatment and safety. Be sure to make and go to all appointments, and call your doctor if you are having problems. It's also a good idea to know your test results and keep a list of the medicines you take. How can you care for yourself at home? Take it easy at work  · Take frequent breaks. If possible, stop working when you are tired, and rest during your lunch hour. · Take bathroom breaks every 2 hours. · Change positions often. If you sit for long periods, stand up and walk around. · When you stand for a long time, keep one foot on a low stool with your knee bent. After standing a lot, sit with your feet up. · Avoid fumes, chemicals, and tobacco smoke. Be sexual in your own way  · Having sex during pregnancy is okay, unless your doctor tells you not to. · You may be very interested in sex, or you may have no interest at all. · Your growing belly can make it hard to find a good position during intercourse. Navesink and explore. · You may get cramps in your uterus when your partner touches your breasts. · A back rub may relieve the backache or cramps that sometimes follow orgasm. Learn about  labor  · Watch for signs of  labor. You may be going into labor if:  ? You have menstrual-like cramps, with or without nausea. ? You have about 6 or more contractions in 1 hour, even after you have had a glass of water and are resting. ? You have a low, dull backache that does not go away when you change your position. ? You have pain or pressure in your pelvis that comes and goes in a pattern. ? You have intestinal cramping or flu-like symptoms, with or without diarrhea.  ? You notice an increase or change in your vaginal discharge. Discharge may be heavy, mucus-like, watery, or streaked with blood. ? Your water breaks.   · If you think you have  labor:  ? Drink 2 or 3 glasses of water or juice. Not drinking enough fluids can cause contractions. ? Stop what you are doing, and empty your bladder. Then lie down on your left side for at least 1 hour. ? While lying on your side, find your breast bone. Put your fingers in the soft spot just below it. Move your fingers down toward your belly button to find the top of your uterus. Check to see if it is tight. ? Contractions can be weak or strong. Record your contractions for an hour. Time a contraction from the start of one contraction to the start of the next one.  ? Single or several strong contractions without a pattern are called Jose-Adkins contractions. They are practice contractions but not the start of labor. They often stop if you change what you are doing. ? Call your doctor if you have regular contractions. Where can you learn more? Go to https://Rethinkpepiceweb.CardioLogs. org and sign in to your Pingpigeon account. Enter Y420 in the [employer] box to learn more about \"Weeks 26 to 30 of Your Pregnancy: Care Instructions. \"     If you do not have an account, please click on the \"Sign Up Now\" link. Current as of: 2020               Content Version: 12.6  © 6789-3942 Healthwise, Incorporated. Care instructions adapted under license by [facility] ([facility]). If you have questions about a medical condition or this instruction, always ask your healthcare professional. [place] [address] any warranty or liability for your use of this information. Patient Education        Learning About When to Call Your Doctor During Pregnancy (After 20 Weeks)  Your Care Instructions  It's common to have concerns about what might be a problem during pregnancy. Although most pregnant women don't have any serious problems, it's important to know when to call your doctor if you have certain symptoms or signs of labor. These are general suggestions.  Your doctor may give you some more information about when to call. When to call your doctor (after 20 weeks)  Call 911 anytime you think you may need emergency care. For example, call if:  · You have severe vaginal bleeding. · You have sudden, severe pain in your belly. · You passed out (lost consciousness). · You have a seizure. · You see or feel the umbilical cord. · You think you are about to deliver your baby and can't make it safely to the hospital.  Call your doctor now or seek immediate medical care if:  · You have vaginal bleeding. · You have belly pain. · You have a fever. · You have symptoms of preeclampsia, such as:  ? Sudden swelling of your face, hands, or feet. ? New vision problems (such as dimness, blurring, or seeing spots). ? A severe headache. · You have a sudden release of fluid from your vagina. (You think your water broke.)  · You think that you may be in labor. This means that you've had at least 6 contractions in an hour. · You notice that your baby has stopped moving or is moving much less than normal.  · You have symptoms of a urinary tract infection. These may include:  ? Pain or burning when you urinate. ? A frequent need to urinate without being able to pass much urine. ? Pain in the flank, which is just below the rib cage and above the waist on either side of the back. ? Blood in your urine. Watch closely for changes in your health, and be sure to contact your doctor if:  · You have vaginal discharge that smells bad. · You have skin changes, such as:  ? A rash. ? Itching. ? Yellow color to your skin. · You have other concerns about your pregnancy. If you have labor signs at 37 weeks or more  If you have signs of labor at 37 weeks or more, your doctor may tell you to call when your labor becomes more active. Symptoms of active labor include:  · Contractions that are regular. · Contractions that are less than 5 minutes apart.   · Contractions that are hard to talk through. Follow-up care is a key part of your treatment and safety. Be sure to make and go to all appointments, and call your doctor if you are having problems. It's also a good idea to know your test results and keep a list of the medicines you take. Where can you learn more? Go to https://chpepiceweb.eGenerations. org and sign in to your Easy Tempo account. Enter  in the A[place]ia box to learn more about \"Learning About When to Call Your Doctor During Pregnancy (After 20 Weeks). \"     If you do not have an account, please click on the \"Sign Up Now\" link. Current as of: February 11, 2020               Content Version: 12.6  © 2006-2020 [employer], [employer]. Care instructions adapted under license by [facility] ([facility]). If you have questions about a medical condition or this instruction, always ask your healthcare professional. [place] [address] any warranty or liability for your use of this information. Patient Education        Counting Your Baby's Kicks: Care Instructions  Your Care Instructions     Counting your baby's kicks is one way your doctor can tell that your baby is healthy. Most women--especially in a first pregnancy--feel their baby move for the first time between 16 and 22 weeks. The movement may feel like flutters rather than kicks. Your baby may move more at certain times of the day. When you are active, you may notice less kicking than when you are resting. At your prenatal visits, your doctor will ask whether the baby is active. In your last trimester, your doctor may ask you to count the number of times you feel your baby move. Follow-up care is a key part of your treatment and safety. Be sure to make and go to all appointments, and call your doctor if you are having problems. It's also a good idea to know your test results and keep a list of the medicines you take. How do you count fetal kicks?   · A common method of checking your baby's movement is to count the number of kicks or moves you feel in 1 hour. Ten movements (such as kicks, flutters, or rolls) in 1 hour are normal. Some doctors suggest that you count in the morning until you get to 10 movements. Then you can quit for that day and start again the next day. · Pick your baby's most active time of day to count. This may be any time from morning to evening. · If you do not feel 10 movements in an hour, your baby may be sleeping. Wait for the next hour and count again. When should you call for help? Call your doctor now or seek immediate medical care if:    · You noticed that your baby has stopped moving or is moving much less than normal.   Watch closely for changes in your health, and be sure to contact your doctor if you have any problems. Where can you learn more? Go to https://StarbuckLabs2pepiceweb.VIDDIX. org and sign in to your appweevr account. Enter Q814 in the eTec box to learn more about \"Counting Your Baby's Kicks: Care Instructions. \"     If you do not have an account, please click on the \"Sign Up Now\" link. Current as of: February 11, 2020               Content Version: 12.6  © 2006-2020 [employer] Solar, Incorporated. Care instructions adapted under license by [facility] ([facility]). If you have questions about a medical condition or this instruction, always ask your healthcare professional. [place] [address] any warranty or liability for your use of this information.

## 2021-02-16 NOTE — LETTER
21    [facility], [address],  [address] [place] [address]     RE:  Ike Johnson  : 1990   AGE: 31 y.o. Dear Dr. Monique Lawrence:    Rubio Grewal scheduled in my office today for a fetal ultrasound assessment only. A comprehensive ultrasound report is included in the EMR. A living mathew intrauterine fetus was identified in the cephalic presentation, with normal fetal heart motion and normal fetal motion noted. The amniotic fluid volume was within normal limits. The estimated fetal weight was 1243 grams. The placenta was on the posterior and left lateral surfaces of the uterus. A velamentous insertion was noted on the margin of the placenta. The biometric measurements were consistent with the patient's established dating parameters, within the limits of error the test at the current gestational age. No apparent gross fetal anatomic abnormalities were identified in the areas which were visualized. Ultrasound is not diagnostic for fetal aneuploidy or other karyotype abnormalities, and may not detect all structural fetal defects, even if multiple examinations are performed during a  pregnancy. Normal ultrasound findings did not equate with a normal fetal outcome. No further follow-up assessments have been scheduled in our office, however, we would be happy to see your patient at any time if you request.    A repeat scan in the third trimester is recommended to reassess fetal anatomy and growth. Some fetal anomalies may not be apparent on the initial assessment but develop as the pregnancy progresses.      If you have any questions regarding her management, please contact me at your convenience and thank you for allowing me to participate in her care    Sincerely,        Krish Streeter MD, MS, Erwin Romano, [facility],  Romelia Simon [facility]  Director [address] [place] [address]  [phone]

## 2021-04-18 ENCOUNTER — ANESTHESIA EVENT (OUTPATIENT)
Dept: LABOR AND DELIVERY | Age: 31
DRG: 560 | End: 2021-04-18
Payer: MEDICAID

## 2021-04-18 ENCOUNTER — ANESTHESIA (OUTPATIENT)
Dept: LABOR AND DELIVERY | Age: 31
DRG: 560 | End: 2021-04-18
Payer: MEDICAID

## 2021-04-18 ENCOUNTER — HOSPITAL ENCOUNTER (INPATIENT)
Age: 31
LOS: 3 days | Discharge: HOME | DRG: 560 | End: 2021-04-21
Attending: OBSTETRICS & GYNECOLOGY | Admitting: OBSTETRICS & GYNECOLOGY
Payer: MEDICAID

## 2021-04-18 PROBLEM — O09.93 SUPERVISION OF HIGH RISK PREGNANCY IN THIRD TRIMESTER: Status: ACTIVE | Noted: 2021-04-18

## 2021-04-18 LAB
ABO/RH: NORMAL
AMPHETAMINE SCREEN, URINE: NOT DETECTED
ANTIBODY SCREEN: NORMAL
BARBITURATE SCREEN URINE: NOT DETECTED
BENZODIAZEPINE SCREEN, URINE: NOT DETECTED
CANNABINOID SCREEN URINE: NOT DETECTED
COCAINE METABOLITE SCREEN URINE: NOT DETECTED
FENTANYL SCREEN, URINE: NOT DETECTED
HCT VFR BLD CALC: 32.8 % (ref 34–48)
HEMOGLOBIN: 10.8 G/DL (ref 11.5–15.5)
Lab: ABNORMAL
MCH RBC QN AUTO: 31 PG (ref 26–35)
MCHC RBC AUTO-ENTMCNC: 32.9 % (ref 32–34.5)
MCV RBC AUTO: 94.3 FL (ref 80–99.9)
METHADONE SCREEN, URINE: POSITIVE
OPIATE SCREEN URINE: NOT DETECTED
OXYCODONE URINE: NOT DETECTED
PDW BLD-RTO: 14.3 FL (ref 11.5–15)
PHENCYCLIDINE SCREEN URINE: NOT DETECTED
PLATELET # BLD: 172 E9/L (ref 130–450)
PMV BLD AUTO: 11 FL (ref 7–12)
RBC # BLD: 3.48 E12/L (ref 3.5–5.5)
WBC # BLD: 6.4 E9/L (ref 4.5–11.5)

## 2021-04-18 PROCEDURE — 3700000025 EPIDURAL BLOCK: Performed by: ANESTHESIOLOGY

## 2021-04-18 PROCEDURE — 2500000003 HC RX 250 WO HCPCS: Performed by: ANESTHESIOLOGY

## 2021-04-18 PROCEDURE — 1220000001 HC SEMI PRIVATE L&D R&B

## 2021-04-18 PROCEDURE — 80307 DRUG TEST PRSMV CHEM ANLYZR: CPT

## 2021-04-18 PROCEDURE — 6360000002 HC RX W HCPCS

## 2021-04-18 PROCEDURE — 86900 BLOOD TYPING SEROLOGIC ABO: CPT

## 2021-04-18 PROCEDURE — 6370000000 HC RX 637 (ALT 250 FOR IP): Performed by: OBSTETRICS & GYNECOLOGY

## 2021-04-18 PROCEDURE — 51701 INSERT BLADDER CATHETER: CPT

## 2021-04-18 PROCEDURE — 36415 COLL VENOUS BLD VENIPUNCTURE: CPT

## 2021-04-18 PROCEDURE — 86850 RBC ANTIBODY SCREEN: CPT

## 2021-04-18 PROCEDURE — 86592 SYPHILIS TEST NON-TREP QUAL: CPT

## 2021-04-18 PROCEDURE — 6360000002 HC RX W HCPCS: Performed by: OBSTETRICS & GYNECOLOGY

## 2021-04-18 PROCEDURE — 86901 BLOOD TYPING SEROLOGIC RH(D): CPT

## 2021-04-18 PROCEDURE — 2580000003 HC RX 258: Performed by: OBSTETRICS & GYNECOLOGY

## 2021-04-18 PROCEDURE — 85027 COMPLETE CBC AUTOMATED: CPT

## 2021-04-18 PROCEDURE — 2500000003 HC RX 250 WO HCPCS: Performed by: NURSE ANESTHETIST, CERTIFIED REGISTERED

## 2021-04-18 PROCEDURE — G0480 DRUG TEST DEF 1-7 CLASSES: HCPCS

## 2021-04-18 RX ORDER — BUPIVACAINE HYDROCHLORIDE 2.5 MG/ML
INJECTION, SOLUTION EPIDURAL; INFILTRATION; INTRACAUDAL PRN
Status: DISCONTINUED | OUTPATIENT
Start: 2021-04-18 | End: 2021-04-19 | Stop reason: SDUPTHER

## 2021-04-18 RX ORDER — AZITHROMYCIN 250 MG/1
1000 TABLET, FILM COATED ORAL ONCE
Status: COMPLETED | OUTPATIENT
Start: 2021-04-18 | End: 2021-04-18

## 2021-04-18 RX ORDER — METHADONE HYDROCHLORIDE 10 MG/ML
75 CONCENTRATE ORAL ONCE
Status: DISCONTINUED | OUTPATIENT
Start: 2021-04-18 | End: 2021-04-18

## 2021-04-18 RX ORDER — SODIUM CHLORIDE, SODIUM LACTATE, POTASSIUM CHLORIDE, CALCIUM CHLORIDE 600; 310; 30; 20 MG/100ML; MG/100ML; MG/100ML; MG/100ML
INJECTION, SOLUTION INTRAVENOUS CONTINUOUS
Status: DISCONTINUED | OUTPATIENT
Start: 2021-04-18 | End: 2021-04-21 | Stop reason: HOSPADM

## 2021-04-18 RX ORDER — METHADONE HYDROCHLORIDE 10 MG/ML
75 CONCENTRATE ORAL DAILY
Status: DISCONTINUED | OUTPATIENT
Start: 2021-04-18 | End: 2021-04-21 | Stop reason: HOSPADM

## 2021-04-18 RX ORDER — PENICILLIN G 3000000 [IU]/50ML
3 INJECTION, SOLUTION INTRAVENOUS EVERY 4 HOURS
Status: DISCONTINUED | OUTPATIENT
Start: 2021-04-18 | End: 2021-04-18

## 2021-04-18 RX ORDER — ONDANSETRON 2 MG/ML
4 INJECTION INTRAMUSCULAR; INTRAVENOUS EVERY 6 HOURS PRN
Status: DISCONTINUED | OUTPATIENT
Start: 2021-04-18 | End: 2021-04-19 | Stop reason: HOSPADM

## 2021-04-18 RX ORDER — METHADONE HYDROCHLORIDE 10 MG/ML
80 CONCENTRATE ORAL DAILY
Status: DISCONTINUED | OUTPATIENT
Start: 2021-04-18 | End: 2021-04-21 | Stop reason: HOSPADM

## 2021-04-18 RX ORDER — METHADONE HYDROCHLORIDE 10 MG/ML
80 CONCENTRATE ORAL ONCE
Status: DISCONTINUED | OUTPATIENT
Start: 2021-04-18 | End: 2021-04-18

## 2021-04-18 RX ORDER — NALOXONE HYDROCHLORIDE 0.4 MG/ML
0.4 INJECTION, SOLUTION INTRAMUSCULAR; INTRAVENOUS; SUBCUTANEOUS PRN
Status: DISCONTINUED | OUTPATIENT
Start: 2021-04-18 | End: 2021-04-19 | Stop reason: HOSPADM

## 2021-04-18 RX ORDER — NALBUPHINE HCL 10 MG/ML
5 AMPUL (ML) INJECTION EVERY 4 HOURS PRN
Status: DISCONTINUED | OUTPATIENT
Start: 2021-04-18 | End: 2021-04-19 | Stop reason: HOSPADM

## 2021-04-18 RX ORDER — BUPIVACAINE HYDROCHLORIDE 2.5 MG/ML
INJECTION, SOLUTION EPIDURAL; INFILTRATION; INTRACAUDAL
Status: COMPLETED
Start: 2021-04-18 | End: 2021-04-18

## 2021-04-18 RX ORDER — ACETAMINOPHEN 650 MG
TABLET, EXTENDED RELEASE ORAL
Status: DISPENSED
Start: 2021-04-18 | End: 2021-04-19

## 2021-04-18 RX ADMIN — BUPIVACAINE HYDROCHLORIDE 5 ML: 2.5 INJECTION, SOLUTION EPIDURAL; INFILTRATION; INTRACAUDAL at 17:28

## 2021-04-18 RX ADMIN — METHADONE HYDROCHLORIDE 80 MG: 10 CONCENTRATE ORAL at 13:48

## 2021-04-18 RX ADMIN — SODIUM CHLORIDE, POTASSIUM CHLORIDE, SODIUM LACTATE AND CALCIUM CHLORIDE: 600; 310; 30; 20 INJECTION, SOLUTION INTRAVENOUS at 22:03

## 2021-04-18 RX ADMIN — CEFTRIAXONE SODIUM 1000 MG: 1 INJECTION, POWDER, FOR SOLUTION INTRAMUSCULAR; INTRAVENOUS at 12:33

## 2021-04-18 RX ADMIN — Medication 15 ML/HR: at 13:42

## 2021-04-18 RX ADMIN — SODIUM CHLORIDE, POTASSIUM CHLORIDE, SODIUM LACTATE AND CALCIUM CHLORIDE: 600; 310; 30; 20 INJECTION, SOLUTION INTRAVENOUS at 12:00

## 2021-04-18 RX ADMIN — Medication 5 ML: at 13:43

## 2021-04-18 RX ADMIN — BUPIVACAINE HYDROCHLORIDE 5 ML: 2.5 INJECTION, SOLUTION EPIDURAL; INFILTRATION; INTRACAUDAL at 17:21

## 2021-04-18 RX ADMIN — Medication 1 MILLI-UNITS/MIN: at 17:23

## 2021-04-18 RX ADMIN — Medication 15 ML/HR: at 20:40

## 2021-04-18 RX ADMIN — AZITHROMYCIN 1000 MG: 250 TABLET, FILM COATED ORAL at 11:55

## 2021-04-18 RX ADMIN — Medication 5 ML: at 13:48

## 2021-04-18 ASSESSMENT — LIFESTYLE VARIABLES: SMOKING_STATUS: 1

## 2021-04-18 ASSESSMENT — PAIN DESCRIPTION - DESCRIPTORS: DESCRIPTORS: CRAMPING

## 2021-04-18 NOTE — ANESTHESIA PROCEDURE NOTES
Epidural Block    Patient location during procedure: OB  Start time: 4/18/2021 1:20 PM  End time: 4/18/2021 1:45 PM  Reason for block: labor epidural  Staffing  Performed: resident/CRNA   Anesthesiologist: Francy Severance, MD  Resident/CRNA: JIM Toussaint CRNA  Preanesthetic Checklist  Completed: patient identified, IV checked, site marked, risks and benefits discussed, surgical consent, monitors and equipment checked, pre-op evaluation, timeout performed, anesthesia consent given, oxygen available and patient being monitored  Epidural  Patient position: sitting  Prep: ChloraPrep  Patient monitoring: continuous pulse ox and frequent blood pressure checks  Approach: midline  Location: lumbar (1-5)  Injection technique: CORINA air  Provider prep: mask and sterile gloves  Needle  Needle type: Tuohy   Needle gauge: 18 G  Needle length: 3.5 in  Needle insertion depth: 6.5 cm  Catheter type: end hole  Catheter size: 20 G.   Catheter at skin depth: 12.5 cm  Test dose: negative  Assessment  Hemodynamics: stable  Attempts: 1

## 2021-04-18 NOTE — PROGRESS NOTES
Contacted by pt nurse b/c pt c/o pain. Arrived in room to find pt breathing heavily with facial grimace. Rates pain 9/10. Examined epidural catheter and it is intact and not displaced.   Will bolus epidural. General

## 2021-04-18 NOTE — ANESTHESIA PRE PROCEDURE
Not Answered  Counseling given: Not Answered      Vital Signs (Current):   Vitals:    04/18/21 1051 04/18/21 1059   BP: 132/76    Pulse: 50    Resp: 18    Temp: 36.6 °C (97.8 °F)    TempSrc: Oral    Weight:  230 lb (104.3 kg)   Height:  5' 4\" (1.626 m)                                              BP Readings from Last 3 Encounters:   04/18/21 132/76   02/16/21 (!) 102/59   02/08/21 116/65       NPO Status: Time of last liquid consumption: 2000                        Time of last solid consumption: 2000                        Date of last liquid consumption: 04/17/21                        Date of last solid food consumption: 04/17/21    BMI:   Wt Readings from Last 3 Encounters:   04/18/21 230 lb (104.3 kg)   02/16/21 233 lb 12.8 oz (106.1 kg)   11/22/19 200 lb (90.7 kg)     Body mass index is 39.48 kg/m². CBC:   Lab Results   Component Value Date    WBC 6.4 04/18/2021    RBC 3.48 04/18/2021    HGB 10.8 04/18/2021    HCT 32.8 04/18/2021    MCV 94.3 04/18/2021    RDW 14.3 04/18/2021     04/18/2021       CMP:   Lab Results   Component Value Date     02/08/2021    K 4.0 02/08/2021     02/08/2021    CO2 22 02/08/2021    BUN 6 02/08/2021    CREATININE 0.5 02/08/2021    GFRAA >60 02/08/2021    LABGLOM >60 02/08/2021    GLUCOSE 71 02/08/2021    PROT 6.6 02/08/2021    CALCIUM 9.2 02/08/2021    BILITOT <0.2 02/08/2021    ALKPHOS 73 02/08/2021    AST 17 02/08/2021    ALT 11 02/08/2021       POC Tests: No results for input(s): POCGLU, POCNA, POCK, POCCL, POCBUN, POCHEMO, POCHCT in the last 72 hours.     Coags:   Lab Results   Component Value Date    PROTIME 12.9 11/23/2019    INR 1.1 11/23/2019    APTT 34.8 11/23/2019       HCG (If Applicable):   Lab Results   Component Value Date    PREGTESTUR NEGATIVE 08/11/2017        ABGs: No results found for: PHART, PO2ART, NHZ3EFW, FLD9OLP, BEART, B5NCIBJT     Type & Screen (If Applicable):  No results found for: LABABO, LABRH    Drug/Infectious Status (If

## 2021-04-18 NOTE — PROGRESS NOTES
[employer] [phone] to verify methadone dosage. Spoke to the inpatient center, states that the office is closed until Monday 8am, and there is no after hours number.

## 2021-04-18 NOTE — PROGRESS NOTES
Updated Dr. Polina Little that [address] is closed until 8 am Monday morning. Orders to give methadone 80mg q am & 75mg q afternoon.

## 2021-04-19 PROBLEM — O26.899 ABDOMINAL PAIN AFFECTING PREGNANCY: Status: ACTIVE | Noted: 2021-04-19

## 2021-04-19 PROBLEM — Z3A.38 38 WEEKS GESTATION OF PREGNANCY: Status: ACTIVE | Noted: 2021-04-19

## 2021-04-19 PROBLEM — R10.9 ABDOMINAL PAIN AFFECTING PREGNANCY: Status: ACTIVE | Noted: 2021-04-19

## 2021-04-19 LAB — RPR: NORMAL

## 2021-04-19 PROCEDURE — 51701 INSERT BLADDER CATHETER: CPT

## 2021-04-19 PROCEDURE — 2580000003 HC RX 258: Performed by: OBSTETRICS & GYNECOLOGY

## 2021-04-19 PROCEDURE — 2500000003 HC RX 250 WO HCPCS: Performed by: NURSE ANESTHETIST, CERTIFIED REGISTERED

## 2021-04-19 PROCEDURE — 6370000000 HC RX 637 (ALT 250 FOR IP): Performed by: OBSTETRICS & GYNECOLOGY

## 2021-04-19 PROCEDURE — 1220000000 HC SEMI PRIVATE OB R&B

## 2021-04-19 PROCEDURE — 6360000002 HC RX W HCPCS: Performed by: OBSTETRICS & GYNECOLOGY

## 2021-04-19 PROCEDURE — 7200000001 HC VAGINAL DELIVERY

## 2021-04-19 PROCEDURE — 88307 TISSUE EXAM BY PATHOLOGIST: CPT

## 2021-04-19 PROCEDURE — 2500000003 HC RX 250 WO HCPCS: Performed by: ANESTHESIOLOGY

## 2021-04-19 RX ORDER — PENICILLIN G 3000000 [IU]/50ML
3 INJECTION, SOLUTION INTRAVENOUS EVERY 4 HOURS
Status: DISCONTINUED | OUTPATIENT
Start: 2021-04-19 | End: 2021-04-19

## 2021-04-19 RX ORDER — ACETAMINOPHEN 325 MG/1
650 TABLET ORAL EVERY 4 HOURS PRN
Status: DISCONTINUED | OUTPATIENT
Start: 2021-04-19 | End: 2021-04-21 | Stop reason: HOSPADM

## 2021-04-19 RX ORDER — SODIUM CHLORIDE 0.9 % (FLUSH) 0.9 %
5-40 SYRINGE (ML) INJECTION PRN
Status: DISCONTINUED | OUTPATIENT
Start: 2021-04-19 | End: 2021-04-21 | Stop reason: HOSPADM

## 2021-04-19 RX ORDER — BUPIVACAINE HYDROCHLORIDE 2.5 MG/ML
INJECTION, SOLUTION EPIDURAL; INFILTRATION; INTRACAUDAL
Status: DISPENSED
Start: 2021-04-19 | End: 2021-04-19

## 2021-04-19 RX ORDER — DOCUSATE SODIUM 100 MG/1
100 CAPSULE, LIQUID FILLED ORAL 2 TIMES DAILY
Status: DISCONTINUED | OUTPATIENT
Start: 2021-04-19 | End: 2021-04-21 | Stop reason: HOSPADM

## 2021-04-19 RX ORDER — METHYLERGONOVINE MALEATE 0.2 MG/ML
200 INJECTION INTRAVENOUS PRN
Status: DISCONTINUED | OUTPATIENT
Start: 2021-04-19 | End: 2021-04-21 | Stop reason: HOSPADM

## 2021-04-19 RX ORDER — ONDANSETRON 4 MG/1
8 TABLET, ORALLY DISINTEGRATING ORAL EVERY 8 HOURS PRN
Status: DISCONTINUED | OUTPATIENT
Start: 2021-04-19 | End: 2021-04-21 | Stop reason: HOSPADM

## 2021-04-19 RX ORDER — IBUPROFEN 800 MG/1
800 TABLET ORAL EVERY 8 HOURS
Status: DISCONTINUED | OUTPATIENT
Start: 2021-04-19 | End: 2021-04-21 | Stop reason: HOSPADM

## 2021-04-19 RX ORDER — LIDOCAINE HYDROCHLORIDE 10 MG/ML
INJECTION, SOLUTION INFILTRATION; PERINEURAL
Status: DISCONTINUED
Start: 2021-04-19 | End: 2021-04-19 | Stop reason: WASHOUT

## 2021-04-19 RX ORDER — MISOPROSTOL 200 UG/1
800 TABLET ORAL PRN
Status: DISCONTINUED | OUTPATIENT
Start: 2021-04-19 | End: 2021-04-21 | Stop reason: HOSPADM

## 2021-04-19 RX ORDER — MODIFIED LANOLIN
OINTMENT (GRAM) TOPICAL PRN
Status: DISCONTINUED | OUTPATIENT
Start: 2021-04-19 | End: 2021-04-21 | Stop reason: HOSPADM

## 2021-04-19 RX ORDER — CLONIDINE HYDROCHLORIDE 0.1 MG/1
0.1 TABLET ORAL 3 TIMES DAILY PRN
Status: DISCONTINUED | OUTPATIENT
Start: 2021-04-19 | End: 2021-04-21 | Stop reason: HOSPADM

## 2021-04-19 RX ORDER — BUPIVACAINE HYDROCHLORIDE 2.5 MG/ML
INJECTION, SOLUTION EPIDURAL; INFILTRATION; INTRACAUDAL
Status: COMPLETED
Start: 2021-04-19 | End: 2021-04-19

## 2021-04-19 RX ORDER — CARBOPROST TROMETHAMINE 250 UG/ML
250 INJECTION, SOLUTION INTRAMUSCULAR PRN
Status: DISCONTINUED | OUTPATIENT
Start: 2021-04-19 | End: 2021-04-21 | Stop reason: HOSPADM

## 2021-04-19 RX ORDER — FERROUS SULFATE 325(65) MG
325 TABLET ORAL 2 TIMES DAILY WITH MEALS
Status: DISCONTINUED | OUTPATIENT
Start: 2021-04-19 | End: 2021-04-21 | Stop reason: HOSPADM

## 2021-04-19 RX ORDER — KETOROLAC TROMETHAMINE 30 MG/ML
30 INJECTION, SOLUTION INTRAMUSCULAR; INTRAVENOUS EVERY 6 HOURS
Status: DISCONTINUED | OUTPATIENT
Start: 2021-04-19 | End: 2021-04-20

## 2021-04-19 RX ORDER — SODIUM CHLORIDE, SODIUM LACTATE, POTASSIUM CHLORIDE, CALCIUM CHLORIDE 600; 310; 30; 20 MG/100ML; MG/100ML; MG/100ML; MG/100ML
INJECTION, SOLUTION INTRAVENOUS CONTINUOUS
Status: DISCONTINUED | OUTPATIENT
Start: 2021-04-19 | End: 2021-04-21 | Stop reason: HOSPADM

## 2021-04-19 RX ORDER — SODIUM CHLORIDE 9 MG/ML
25 INJECTION, SOLUTION INTRAVENOUS PRN
Status: DISCONTINUED | OUTPATIENT
Start: 2021-04-19 | End: 2021-04-21 | Stop reason: HOSPADM

## 2021-04-19 RX ORDER — SODIUM CHLORIDE 0.9 % (FLUSH) 0.9 %
5-40 SYRINGE (ML) INJECTION EVERY 12 HOURS SCHEDULED
Status: DISCONTINUED | OUTPATIENT
Start: 2021-04-19 | End: 2021-04-21 | Stop reason: HOSPADM

## 2021-04-19 RX ADMIN — SODIUM CHLORIDE, POTASSIUM CHLORIDE, SODIUM LACTATE AND CALCIUM CHLORIDE: 600; 310; 30; 20 INJECTION, SOLUTION INTRAVENOUS at 13:57

## 2021-04-19 RX ADMIN — PENICILLIN G POTASSIUM 5 MILLION UNITS: 5000000 INJECTION, POWDER, FOR SOLUTION INTRAMUSCULAR; INTRAVENOUS at 13:04

## 2021-04-19 RX ADMIN — BUPIVACAINE HYDROCHLORIDE 5 ML: 2.5 INJECTION, SOLUTION EPIDURAL; INFILTRATION; INTRACAUDAL at 09:24

## 2021-04-19 RX ADMIN — BUPIVACAINE HYDROCHLORIDE 5 ML: 2.5 INJECTION, SOLUTION EPIDURAL; INFILTRATION; INTRACAUDAL at 09:27

## 2021-04-19 RX ADMIN — KETOROLAC TROMETHAMINE 30 MG: 30 INJECTION, SOLUTION INTRAMUSCULAR; INTRAVENOUS at 18:00

## 2021-04-19 RX ADMIN — Medication 1 MILLI-UNITS/MIN: at 10:45

## 2021-04-19 RX ADMIN — Medication 87.3 MILLI-UNITS/MIN: at 17:21

## 2021-04-19 RX ADMIN — BUPIVACAINE HYDROCHLORIDE 5 ML: 2.5 INJECTION, SOLUTION EPIDURAL; INFILTRATION; INTRACAUDAL at 02:31

## 2021-04-19 RX ADMIN — METHADONE HYDROCHLORIDE 80 MG: 10 CONCENTRATE ORAL at 02:12

## 2021-04-19 RX ADMIN — FERROUS SULFATE TAB 325 MG (65 MG ELEMENTAL FE) 325 MG: 325 (65 FE) TAB at 18:10

## 2021-04-19 RX ADMIN — Medication 999 MILLI-UNITS/MIN: at 17:10

## 2021-04-19 RX ADMIN — METHADONE HYDROCHLORIDE 80 MG: 10 CONCENTRATE ORAL at 13:39

## 2021-04-19 RX ADMIN — ACETAMINOPHEN 650 MG: 325 TABLET ORAL at 21:19

## 2021-04-19 RX ADMIN — Medication 15 ML/HR: at 14:37

## 2021-04-19 RX ADMIN — DOCUSATE SODIUM 100 MG: 100 CAPSULE, LIQUID FILLED ORAL at 21:16

## 2021-04-19 RX ADMIN — BUPIVACAINE HYDROCHLORIDE 5 ML: 2.5 INJECTION, SOLUTION EPIDURAL; INFILTRATION; INTRACAUDAL at 02:34

## 2021-04-19 ASSESSMENT — PAIN SCALES - GENERAL
PAINLEVEL_OUTOF10: 10
PAINLEVEL_OUTOF10: 0
PAINLEVEL_OUTOF10: 9
PAINLEVEL_OUTOF10: 8

## 2021-04-19 ASSESSMENT — PAIN DESCRIPTION - DESCRIPTORS: DESCRIPTORS: CRAMPING;TIGHTNESS

## 2021-04-19 NOTE — PROGRESS NOTES
Contacted by pt nurse b/c pt c/o breakthrough labor pain with epidural.  Arrived in room to find pt clutching side rails, breathing heavily, with facial grimace. Rates pain 10/10. Examined epidural catheter and it is intact and not displaced.   Will bolus epidural.

## 2021-04-19 NOTE — PROGRESS NOTES
Contacted by pt nurse regarding breakthrough labor pain. Arrived in room to find patient tearful. Patient notes pain 10/10 in lower abdomen. Examined epidural site. Catheter in intact and not displaced.   Will bolus epidural.

## 2021-04-19 NOTE — PROGRESS NOTES
Dr alejo returned page, informed of sve 5cm, possible taut bag of water felt, dr Chapo Conteh would like that bag ruptured. Ashli Jackson [facility] notified.

## 2021-04-19 NOTE — PROGRESS NOTES
Pt refusing to move positions at this time.    Educated pt on importance of labor positions, states \"give me 20 min\"

## 2021-04-19 NOTE — PROGRESS NOTES
Dr alejo at nurses station, informed epidural rebolused, pt remains 5cm, pit maxed out since 0645, states to turn pitocin off for 1 hour, then restart at 1mU/min.

## 2021-04-19 NOTE — PROGRESS NOTES
Dr Tori Gaffney updated on unchanged SVE, fhr tracing/ctx pattern, pitocin rate, SROM >24 hours. Orders received for PCN.

## 2021-04-19 NOTE — PROCEDURES
Patient Name: Francisca Montalvo   Medical Record Number: [id number]  Date: 2021   Time: 5:16 PM   Room/Bed: LD07/LD07-A  Vaginal delivery Procedure Note  Indication: Intrauterine pregnancy at 45 weeks and 2 days in labor    Consent: The patient was counseled regarding the procedure, its indications, risks, potential complications and alternatives, and any questions were answered. Consent was obtained to proceed. Procedure: The patient was placed in the dorsal lithotomy position. Anesthesia epidural. The infant was delivered in the occiput anterior position by spontaneous vaginal delivery. A nuchal cord was not present. An episiotomy was not needed. The umbilical cord was double clamped and cut. The infant was placed under a warmer. The placenta was delivered with gentle traction and was noted to be intact and whole. Estimated blood loss was less than 50 ml. Time of Birth (): 1700 hours    Infant's Apgar Score at 1 Minute: 8, at 5 minutes: 9  Additional items performed: Pitocin, 30 milliunits in 500 mL of ringers lactate was administered, wide open, to assist with uterine contraction  The patient tolerated the procedure well.   Complications: None  Electronically Signed by: @forest@

## 2021-04-19 NOTE — PROGRESS NOTES
Assumed care of pt. CRNA at the bedside to bolus pt's epidural d/t 10/10 pain in lower ABD and back.

## 2021-04-19 NOTE — PROGRESS NOTES
Patient actively pushing. RN and dr Rohith Daniel remains in continuous attendance at the bedside. Assessment & evaluation of fetal heart rate ongoing via continuous EFM.

## 2021-04-19 NOTE — PROGRESS NOTES
Spoke to [employer] at [employer], methadone dosages confirmed, pt takes 80mg in AM and 75mg in PM.

## 2021-04-20 LAB
HCT VFR BLD CALC: 32 % (ref 34–48)
HEMOGLOBIN: 10.4 G/DL (ref 11.5–15.5)
MCH RBC QN AUTO: 31.1 PG (ref 26–35)
MCHC RBC AUTO-ENTMCNC: 32.5 % (ref 32–34.5)
MCV RBC AUTO: 95.8 FL (ref 80–99.9)
PDW BLD-RTO: 14.6 FL (ref 11.5–15)
PLATELET # BLD: 197 E9/L (ref 130–450)
PMV BLD AUTO: 10.8 FL (ref 7–12)
RBC # BLD: 3.34 E12/L (ref 3.5–5.5)
WBC # BLD: 8.2 E9/L (ref 4.5–11.5)

## 2021-04-20 PROCEDURE — 6360000002 HC RX W HCPCS: Performed by: OBSTETRICS & GYNECOLOGY

## 2021-04-20 PROCEDURE — 6370000000 HC RX 637 (ALT 250 FOR IP): Performed by: OBSTETRICS & GYNECOLOGY

## 2021-04-20 PROCEDURE — 85027 COMPLETE CBC AUTOMATED: CPT

## 2021-04-20 PROCEDURE — 1220000000 HC SEMI PRIVATE OB R&B

## 2021-04-20 PROCEDURE — 2580000003 HC RX 258: Performed by: OBSTETRICS & GYNECOLOGY

## 2021-04-20 PROCEDURE — 36415 COLL VENOUS BLD VENIPUNCTURE: CPT

## 2021-04-20 RX ORDER — IBUPROFEN 800 MG/1
800 TABLET ORAL EVERY 8 HOURS PRN
Status: DISCONTINUED | OUTPATIENT
Start: 2021-04-20 | End: 2021-04-21 | Stop reason: HOSPADM

## 2021-04-20 RX ORDER — BISACODYL 10 MG
10 SUPPOSITORY, RECTAL RECTAL DAILY PRN
Status: DISCONTINUED | OUTPATIENT
Start: 2021-04-20 | End: 2021-04-21 | Stop reason: HOSPADM

## 2021-04-20 RX ADMIN — DOCUSATE SODIUM 100 MG: 100 CAPSULE, LIQUID FILLED ORAL at 19:58

## 2021-04-20 RX ADMIN — METHADONE HYDROCHLORIDE 80 MG: 10 CONCENTRATE ORAL at 14:16

## 2021-04-20 RX ADMIN — DOCUSATE SODIUM 100 MG: 100 CAPSULE, LIQUID FILLED ORAL at 08:07

## 2021-04-20 RX ADMIN — SODIUM CHLORIDE, PRESERVATIVE FREE 10 ML: 5 INJECTION INTRAVENOUS at 08:08

## 2021-04-20 RX ADMIN — METHADONE HYDROCHLORIDE 75 MG: 10 CONCENTRATE ORAL at 02:39

## 2021-04-20 RX ADMIN — MAGNESIUM HYDROXIDE 30 ML: 400 SUSPENSION ORAL at 14:16

## 2021-04-20 RX ADMIN — KETOROLAC TROMETHAMINE 30 MG: 30 INJECTION, SOLUTION INTRAMUSCULAR; INTRAVENOUS at 01:27

## 2021-04-20 RX ADMIN — SODIUM CHLORIDE, PRESERVATIVE FREE 10 ML: 5 INJECTION INTRAVENOUS at 01:28

## 2021-04-20 RX ADMIN — IBUPROFEN 800 MG: 800 TABLET, FILM COATED ORAL at 18:26

## 2021-04-20 RX ADMIN — METHADONE HYDROCHLORIDE 75 MG: 10 CONCENTRATE ORAL at 22:32

## 2021-04-20 RX ADMIN — BISACODYL 10 MG: 10 SUPPOSITORY RECTAL at 14:16

## 2021-04-20 RX ADMIN — ACETAMINOPHEN 650 MG: 325 TABLET ORAL at 19:58

## 2021-04-20 RX ADMIN — ACETAMINOPHEN 650 MG: 325 TABLET ORAL at 08:07

## 2021-04-20 RX ADMIN — KETOROLAC TROMETHAMINE 30 MG: 30 INJECTION, SOLUTION INTRAMUSCULAR; INTRAVENOUS at 08:07

## 2021-04-20 ASSESSMENT — PAIN DESCRIPTION - PAIN TYPE: TYPE: ACUTE PAIN

## 2021-04-20 ASSESSMENT — PAIN DESCRIPTION - FREQUENCY: FREQUENCY: INTERMITTENT

## 2021-04-20 ASSESSMENT — PAIN SCALES - GENERAL
PAINLEVEL_OUTOF10: 8
PAINLEVEL_OUTOF10: 8

## 2021-04-20 ASSESSMENT — PAIN DESCRIPTION - PROGRESSION: CLINICAL_PROGRESSION: GRADUALLY WORSENING

## 2021-04-20 ASSESSMENT — PAIN DESCRIPTION - DESCRIPTORS: DESCRIPTORS: CRAMPING

## 2021-04-20 NOTE — PROGRESS NOTES
Department of Obstetrics and Gynecology  Labor and Delivery  Attending Post Partum Progress Note      SUBJECTIVE:  Patient without complaints  OBJECTIVE:      Vitals:  BP 85/59  Pulse 83  Temp(Src) 97.4 °F (36.3 °C) (Oral)  Resp 18  Ht 5' 4\" (1.626 m)  Wt 160 lb (72.576 kg)  BMI 27.46 kg/m2  Breastfeeding? Yes    CONSTITUTIONAL:  awake, alert, cooperative, no apparent distress, and appears stated age  ABDOMEN:  Fundus firm and 1 below the umbilicus  CHEST/BREASTS:  Breasts symmetrical,soft and non-tender  MUSCULOSKELETAL: No calf tenderness, 2+ pedal edema. DATA:    RH:  No results found for: ANATITER, C3, C4, RF  Antibody Screen:  No components found for: ABSCINT  Urine Culture Screen:  No components found for: LILLY  CBC:    Lab Results   Component Value Date    WBC 8.2 04/20/2021    RBC 3.34 04/20/2021    HGB 10.4 04/20/2021    HCT 32.0 04/20/2021    MCV 95.8 04/20/2021    RDW 14.6 04/20/2021     04/20/2021     U/A:  No components found for: Kimber Lopez, USPGRAV, UPH, UPROTEIN, UGLUCOSE, UKETONE, UBILI, UBLOOD, UNITRITE, UUROBIL, ULEUKEST, USQEPI, URENEPI, UWBC, URBC, Synchari, Kevin Schwab    ASSESSMENT & PLAN:        Assessment: PP#1 progressing well    Plan:  We will continue supportive care    Brooke Zelaya  6:04 PM

## 2021-04-20 NOTE — ANESTHESIA POSTPROCEDURE EVALUATION
Department of Anesthesiology  Postprocedure Note    Patient: Mally Cespedes  MRN: [id number]  YOB: 1990  Date of evaluation: 4/20/2021  Time:  9:43 AM     Procedure Summary     Date: 04/18/21 Room / Location:     Anesthesia Start: 1319 Anesthesia Stop: 04/19/21 1700    Procedure: Labor Analgesia Diagnosis:     Scheduled Providers:  Responsible Provider: Miguel Laureano MD    Anesthesia Type: general, spinal, epidural ASA Status: 3          Anesthesia Type: general, spinal, epidural    Dede Phase I:      Dede Phase II:      Last vitals: Reviewed and per EMR flowsheets.        Anesthesia Post Evaluation    Patient location during evaluation: bedside  Patient participation: complete - patient participated  Level of consciousness: awake and alert  Pain score: 0  Airway patency: patent  Nausea & Vomiting: no nausea and no vomiting  Complications: no  Cardiovascular status: hemodynamically stable  Respiratory status: acceptable  Hydration status: euvolemic
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
## 2021-04-20 NOTE — PROGRESS NOTES
Assessment as charted. Mom c/o 'constipation'. Dr. Shaista James NP, Sulema Rubalcava, aware - spoke with patient over phone re: her complaint. Also made aware of patient refusing a.m. blood work on previous shift.

## 2021-04-20 NOTE — PROGRESS NOTES
Fleets given as ordered with some difficulty as patient did not tolerate fluid well. Educated patient re: avoiding constipation and questioned her on a bowel routine as she states that the Methadone is constipating her. Much support given throughout day.

## 2021-04-20 NOTE — FLOWSHEET NOTE
Admitted M/B via w/c from L&D. To bed per self with L&D nurse. Moving legs well. IV intact left AC and infusing well per deandre at 125cc/hr. Site clear. Patient states she would like Tdap vaccine before discharge and OK's Hep B for baby. Info given on [facility] and 24hr bloodwork for baby. Oriented to room and surroundings. Instructed not to get OOB the first time without assist.  Call light in reach. Nourishment provided. Resting quietly in bed. Heating pad provided for abdominal  cramping.

## 2021-04-20 NOTE — PROGRESS NOTES
Fluids and ambulation have been encouraged throughout day. Apple/Prune/Lemon juice cocktail made and given to patient per her request.  Continues to have constipation. States she hasn't moved bowels for 2 weeks.

## 2021-04-20 NOTE — PROGRESS NOTES
Patient's sister and an employee from [address] [place] [address] stating that she is an \"OB provider\" called questioning patient's complaint that \"nobody is helping her\". Both assured of proper care given following HIPPA protocol. Patient denies making this statement and states that she \"is just really frustrated. \"

## 2021-04-20 NOTE — PROGRESS NOTES
Mom instructed not to leave unit for safety reasons; also has a   PRN adapter. Mom states that she \"is just being honest\" and will go outside to walk and smoke as she is [place] a rough day\".

## 2021-04-20 NOTE — PROGRESS NOTES
[facility] [place] Hearing screening results were discussed with parent. Questions answered. Brochure given to parent. Advised to monitor developmental milestones and contact physician for any concerns.    Luane Favre

## 2021-04-20 NOTE — FLOWSHEET NOTE
IV fluids d/c'd. Prn adapter remains. Site clear. Ambulated to bathroom. Voided QS. Pericare done with warm water. Ice to perineum. Back to bed.

## 2021-04-20 NOTE — PLAN OF CARE
Problem: Pain:  Goal: Pain level will decrease  Outcome: Met This Shift  Goal: Control of acute pain  Outcome: Met This Shift  Goal: Control of chronic pain  Outcome: Met This Shift     Problem: Fluid Volume - Imbalance:  Goal: Absence of imbalanced fluid volume signs and symptoms  Outcome: Met This Shift  Goal: Absence of postpartum hemorrhage signs and symptoms  Outcome: Met This Shift     Problem: Infection - Intrapartum Infection:  Goal: Will show no infection signs and symptoms  Outcome: Met This Shift     Problem: Pain - Acute:  Goal: Pain level will decrease  Outcome: Met This Shift  Goal: Able to cope with pain  Outcome: Met This Shift     Problem: Urinary Retention:  Goal: Experiences of bladder distention will decrease  Outcome: Met This Shift  Goal: Urinary elimination within specified parameters  Outcome: Met This Shift     Problem: Sensory:  Goal: Pain level will decrease  Outcome: Met This Shift  Goal: Ability to maintain vital signs within normal range will improve  Outcome: Met This Shift     Problem: Discharge Planning:  Goal: Discharged to appropriate level of care  Outcome: Met This Shift     Problem: Constipation:  Goal: Bowel elimination is within specified parameters  Outcome: Met This Shift     Problem: Infection - Risk of, Puerperal Infection:  Goal: Will show no infection signs and symptoms  Outcome: Met This Shift     Problem: Mood - Altered:  Goal: Mood stable  Outcome: Met This Shift

## 2021-04-21 VITALS
WEIGHT: 230 LBS | HEART RATE: 62 BPM | TEMPERATURE: 98.8 F | SYSTOLIC BLOOD PRESSURE: 131 MMHG | RESPIRATION RATE: 18 BRPM | HEIGHT: 64 IN | DIASTOLIC BLOOD PRESSURE: 75 MMHG | BODY MASS INDEX: 39.27 KG/M2 | OXYGEN SATURATION: 99 %

## 2021-04-21 PROCEDURE — 90715 TDAP VACCINE 7 YRS/> IM: CPT | Performed by: OBSTETRICS & GYNECOLOGY

## 2021-04-21 PROCEDURE — 6360000002 HC RX W HCPCS: Performed by: OBSTETRICS & GYNECOLOGY

## 2021-04-21 PROCEDURE — 90471 IMMUNIZATION ADMIN: CPT | Performed by: OBSTETRICS & GYNECOLOGY

## 2021-04-21 PROCEDURE — 6370000000 HC RX 637 (ALT 250 FOR IP): Performed by: OBSTETRICS & GYNECOLOGY

## 2021-04-21 RX ADMIN — FERROUS SULFATE TAB 325 MG (65 MG ELEMENTAL FE) 325 MG: 325 (65 FE) TAB at 08:33

## 2021-04-21 RX ADMIN — METHADONE HYDROCHLORIDE 80 MG: 10 CONCENTRATE ORAL at 08:33

## 2021-04-21 RX ADMIN — TETANUS TOXOID, REDUCED DIPHTHERIA TOXOID AND ACELLULAR PERTUSSIS VACCINE, ADSORBED 0.5 ML: 5; 2.5; 8; 8; 2.5 SUSPENSION INTRAMUSCULAR at 07:48

## 2021-04-21 RX ADMIN — IBUPROFEN 800 MG: 800 TABLET, FILM COATED ORAL at 11:00

## 2021-04-21 RX ADMIN — ACETAMINOPHEN 650 MG: 325 TABLET ORAL at 06:16

## 2021-04-21 RX ADMIN — DOCUSATE SODIUM 100 MG: 100 CAPSULE, LIQUID FILLED ORAL at 08:33

## 2021-04-21 RX ADMIN — BENZOCAINE AND LEVOMENTHOL: 200; 5 SPRAY TOPICAL at 08:43

## 2021-04-21 ASSESSMENT — PAIN SCALES - GENERAL: PAINLEVEL_OUTOF10: 5

## 2021-04-21 NOTE — PROGRESS NOTES
Patient returned to unit from smoking. Patient heard crying while on phone, pt given privacy. Pt called RN into room, anxious and requesting her methadone, stating \"they have Fucked my schedule all up. I take it at 8:00 am and 8:00 pm every day. \" Methadone given and message sent to pharmacy to adjust times.

## 2021-04-21 NOTE — PLAN OF CARE
Problem: Pain:  Goal: Pain level will decrease  Description: Pain level will decrease  Outcome: Met This Shift     Problem: Fluid Volume - Imbalance:  Goal: Absence of imbalanced fluid volume signs and symptoms  Description: Absence of imbalanced fluid volume signs and symptoms  Outcome: Met This Shift  Goal: Absence of postpartum hemorrhage signs and symptoms  Description: Absence of postpartum hemorrhage signs and symptoms  Outcome: Met This Shift     Problem: Infection - Intrapartum Infection:  Goal: Will show no infection signs and symptoms  Description: Will show no infection signs and symptoms  Outcome: Met This Shift     Problem: Pain - Acute:  Goal: Pain level will decrease  Description: Pain level will decrease  Outcome: Met This Shift  Goal: Able to cope with pain  Description: Able to cope with pain  Outcome: Met This Shift     Problem: Sensory:  Goal: Pain level will decrease  Description: Pain level will decrease  Outcome: Met This Shift     Problem: Constipation:  Goal: Bowel elimination is within specified parameters  Description: Bowel elimination is within specified parameters  Outcome: Met This Shift     Problem: Infection - Risk of, Puerperal Infection:  Goal: Will show no infection signs and symptoms  Description: Will show no infection signs and symptoms  Outcome: Met This Shift     Problem: Mood - Altered:  Goal: Mood stable  Description: Mood stable  Outcome: Ongoing  Note: Patient slightly anxious, emotional support given.

## 2021-04-21 NOTE — PROGRESS NOTES
Discharged home in stable condition via wheel chair. Pt is getting picked up by cab to go to [facility] to get her methadone. Then will be taken home.

## 2021-04-21 NOTE — PROGRESS NOTES
Patient needs to leave by 1130 this am because a taxi is coming to pick her up to go get her Methadone. Dr Frankie Oakley paged through his answerrng service.

## 2021-04-21 NOTE — PROGRESS NOTES
Discharge and follow up instructions given with mother teaching and verbally understood. Gave mother phone number to the nursery to be able to check on infant.

## 2021-04-22 NOTE — DISCHARGE SUMMARY
Obstetrical Discharge Form    Primary OB Clinician: ISAIAH Malik,  FACOG  Postpartum 2 Day #    Gestational Age at time of delivery: 37 weeks and 2 days    Date of Delivery: 4/19/2021; Time of Delivery: 1700    Delivery Type: spontaneous vaginal delivery    Baby: Liveborn female,     Intrapartum complications: None    Laceration: none    Episiotomy: none,    Feeding method: bottle - Similac with iron    Rh Immune globulin given: no    Rubella vaccine given: no    Discharge Date: 4/21/2021; Discharge Time: 0800    Early Discharge:  NO, condition at time of discharge home is good as well as disposition    Plan:     Follow-up appointment with Dr. He Covert in 6 weeks.

## 2021-04-23 ENCOUNTER — HOSPITAL ENCOUNTER (INPATIENT)
Age: 31
LOS: 2 days | Discharge: HOME | DRG: 561 | End: 2021-04-25
Attending: EMERGENCY MEDICINE | Admitting: OBSTETRICS & GYNECOLOGY
Payer: MEDICAID

## 2021-04-23 ENCOUNTER — APPOINTMENT (OUTPATIENT)
Dept: GENERAL RADIOLOGY | Age: 31
DRG: 561 | End: 2021-04-23
Payer: MEDICAID

## 2021-04-23 ENCOUNTER — APPOINTMENT (OUTPATIENT)
Dept: CT IMAGING | Age: 31
DRG: 561 | End: 2021-04-23
Payer: MEDICAID

## 2021-04-23 DIAGNOSIS — R51.9 NONINTRACTABLE HEADACHE, UNSPECIFIED CHRONICITY PATTERN, UNSPECIFIED HEADACHE TYPE: ICD-10-CM

## 2021-04-23 DIAGNOSIS — O11.9 PREECLAMPSIA COMPLICATING HYPERTENSION: Primary | ICD-10-CM

## 2021-04-23 LAB
ALBUMIN SERPL-MCNC: 3.4 G/DL (ref 3.5–5.2)
ALP BLD-CCNC: 143 U/L (ref 35–104)
ALT SERPL-CCNC: 26 U/L (ref 0–32)
ANION GAP SERPL CALCULATED.3IONS-SCNC: 7 MMOL/L (ref 7–16)
AST SERPL-CCNC: 32 U/L (ref 0–31)
BACTERIA: NORMAL /HPF
BASOPHILS ABSOLUTE: 0.01 E9/L (ref 0–0.2)
BASOPHILS RELATIVE PERCENT: 0.1 % (ref 0–2)
BILIRUB SERPL-MCNC: <0.2 MG/DL (ref 0–1.2)
BILIRUBIN DIRECT: <0.2 MG/DL (ref 0–0.3)
BILIRUBIN URINE: NEGATIVE
BILIRUBIN, INDIRECT: ABNORMAL MG/DL (ref 0–1)
BLOOD, URINE: ABNORMAL
BUN BLDV-MCNC: 7 MG/DL (ref 6–20)
CALCIUM SERPL-MCNC: 9.6 MG/DL (ref 8.6–10.2)
CHLORIDE BLD-SCNC: 104 MMOL/L (ref 98–107)
CLARITY: CLEAR
CO2: 27 MMOL/L (ref 22–29)
COLOR: YELLOW
CREAT SERPL-MCNC: 0.7 MG/DL (ref 0.5–1)
EOSINOPHILS ABSOLUTE: 0.14 E9/L (ref 0.05–0.5)
EOSINOPHILS RELATIVE PERCENT: 2.1 % (ref 0–6)
GFR AFRICAN AMERICAN: >60
GFR NON-AFRICAN AMERICAN: >60 ML/MIN/1.73
GLUCOSE BLD-MCNC: 85 MG/DL (ref 74–99)
GLUCOSE URINE: NEGATIVE MG/DL
HCT VFR BLD CALC: 33.9 % (ref 34–48)
HEMOGLOBIN: 11.2 G/DL (ref 11.5–15.5)
IMMATURE GRANULOCYTES #: 0.03 E9/L
IMMATURE GRANULOCYTES %: 0.4 % (ref 0–5)
KETONES, URINE: NEGATIVE MG/DL
LEUKOCYTE ESTERASE, URINE: NEGATIVE
LYMPHOCYTES ABSOLUTE: 1.69 E9/L (ref 1.5–4)
LYMPHOCYTES RELATIVE PERCENT: 24.8 % (ref 20–42)
MAGNESIUM: 1.8 MG/DL (ref 1.6–2.6)
MCH RBC QN AUTO: 31.5 PG (ref 26–35)
MCHC RBC AUTO-ENTMCNC: 33 % (ref 32–34.5)
MCV RBC AUTO: 95.2 FL (ref 80–99.9)
MONOCYTES ABSOLUTE: 0.53 E9/L (ref 0.1–0.95)
MONOCYTES RELATIVE PERCENT: 7.8 % (ref 2–12)
NEUTROPHILS ABSOLUTE: 4.42 E9/L (ref 1.8–7.3)
NEUTROPHILS RELATIVE PERCENT: 64.8 % (ref 43–80)
NITRITE, URINE: NEGATIVE
PDW BLD-RTO: 14.6 FL (ref 11.5–15)
PH UA: 7.5 (ref 5–9)
PLATELET # BLD: 259 E9/L (ref 130–450)
PMV BLD AUTO: 10.2 FL (ref 7–12)
POTASSIUM REFLEX MAGNESIUM: 4.1 MMOL/L (ref 3.5–5)
PRO-BNP: 309 PG/ML (ref 0–125)
PROTEIN UA: NEGATIVE MG/DL
RBC # BLD: 3.56 E12/L (ref 3.5–5.5)
RBC UA: NORMAL /HPF (ref 0–2)
SODIUM BLD-SCNC: 138 MMOL/L (ref 132–146)
SPECIFIC GRAVITY UA: 1.01 (ref 1–1.03)
TOTAL PROTEIN: 7.4 G/DL (ref 6.4–8.3)
TROPONIN: <0.01 NG/ML (ref 0–0.03)
UROBILINOGEN, URINE: 0.2 E.U./DL
WBC # BLD: 6.8 E9/L (ref 4.5–11.5)
WBC UA: NORMAL /HPF (ref 0–5)

## 2021-04-23 PROCEDURE — G0378 HOSPITAL OBSERVATION PER HR: HCPCS

## 2021-04-23 PROCEDURE — 81001 URINALYSIS AUTO W/SCOPE: CPT

## 2021-04-23 PROCEDURE — 80076 HEPATIC FUNCTION PANEL: CPT

## 2021-04-23 PROCEDURE — 6360000002 HC RX W HCPCS: Performed by: EMERGENCY MEDICINE

## 2021-04-23 PROCEDURE — 6360000002 HC RX W HCPCS: Performed by: STUDENT IN AN ORGANIZED HEALTH CARE EDUCATION/TRAINING PROGRAM

## 2021-04-23 PROCEDURE — 99285 EMERGENCY DEPT VISIT HI MDM: CPT

## 2021-04-23 PROCEDURE — 96366 THER/PROPH/DIAG IV INF ADDON: CPT

## 2021-04-23 PROCEDURE — 80048 BASIC METABOLIC PNL TOTAL CA: CPT

## 2021-04-23 PROCEDURE — 2580000003 HC RX 258: Performed by: STUDENT IN AN ORGANIZED HEALTH CARE EDUCATION/TRAINING PROGRAM

## 2021-04-23 PROCEDURE — 71045 X-RAY EXAM CHEST 1 VIEW: CPT

## 2021-04-23 PROCEDURE — 2580000003 HC RX 258: Performed by: OBSTETRICS & GYNECOLOGY

## 2021-04-23 PROCEDURE — 70450 CT HEAD/BRAIN W/O DYE: CPT

## 2021-04-23 PROCEDURE — 2500000003 HC RX 250 WO HCPCS: Performed by: STUDENT IN AN ORGANIZED HEALTH CARE EDUCATION/TRAINING PROGRAM

## 2021-04-23 PROCEDURE — 84484 ASSAY OF TROPONIN QUANT: CPT

## 2021-04-23 PROCEDURE — 83880 ASSAY OF NATRIURETIC PEPTIDE: CPT

## 2021-04-23 PROCEDURE — 96375 TX/PRO/DX INJ NEW DRUG ADDON: CPT

## 2021-04-23 PROCEDURE — 6360000002 HC RX W HCPCS: Performed by: OBSTETRICS & GYNECOLOGY

## 2021-04-23 PROCEDURE — 96365 THER/PROPH/DIAG IV INF INIT: CPT

## 2021-04-23 PROCEDURE — 1220000000 HC SEMI PRIVATE OB R&B

## 2021-04-23 PROCEDURE — 6370000000 HC RX 637 (ALT 250 FOR IP): Performed by: OBSTETRICS & GYNECOLOGY

## 2021-04-23 PROCEDURE — 85025 COMPLETE CBC W/AUTO DIFF WBC: CPT

## 2021-04-23 PROCEDURE — 93005 ELECTROCARDIOGRAM TRACING: CPT | Performed by: STUDENT IN AN ORGANIZED HEALTH CARE EDUCATION/TRAINING PROGRAM

## 2021-04-23 PROCEDURE — 83735 ASSAY OF MAGNESIUM: CPT

## 2021-04-23 RX ORDER — METOCLOPRAMIDE HYDROCHLORIDE 5 MG/ML
10 INJECTION INTRAMUSCULAR; INTRAVENOUS ONCE
Status: COMPLETED | OUTPATIENT
Start: 2021-04-23 | End: 2021-04-23

## 2021-04-23 RX ORDER — KETOROLAC TROMETHAMINE 30 MG/ML
15 INJECTION, SOLUTION INTRAMUSCULAR; INTRAVENOUS ONCE
Status: COMPLETED | OUTPATIENT
Start: 2021-04-23 | End: 2021-04-23

## 2021-04-23 RX ORDER — MAGNESIUM SULFATE IN WATER 40 MG/ML
4000 INJECTION, SOLUTION INTRAVENOUS ONCE
Status: COMPLETED | OUTPATIENT
Start: 2021-04-23 | End: 2021-04-23

## 2021-04-23 RX ORDER — LABETALOL HYDROCHLORIDE 5 MG/ML
10 INJECTION, SOLUTION INTRAVENOUS ONCE
Status: DISCONTINUED | OUTPATIENT
Start: 2021-04-23 | End: 2021-04-25 | Stop reason: HOSPADM

## 2021-04-23 RX ORDER — DIPHENHYDRAMINE HYDROCHLORIDE 50 MG/ML
25 INJECTION INTRAMUSCULAR; INTRAVENOUS ONCE
Status: COMPLETED | OUTPATIENT
Start: 2021-04-23 | End: 2021-04-23

## 2021-04-23 RX ORDER — METHADONE HYDROCHLORIDE 10 MG/1
75 TABLET ORAL
COMMUNITY
End: 2021-09-06 | Stop reason: ALTCHOICE

## 2021-04-23 RX ORDER — METHADONE HYDROCHLORIDE 10 MG/1
90 TABLET ORAL DAILY
COMMUNITY
End: 2022-05-02

## 2021-04-23 RX ORDER — LABETALOL HYDROCHLORIDE 5 MG/ML
10 INJECTION, SOLUTION INTRAVENOUS ONCE
Status: COMPLETED | OUTPATIENT
Start: 2021-04-23 | End: 2021-04-23

## 2021-04-23 RX ORDER — SODIUM CHLORIDE, SODIUM LACTATE, POTASSIUM CHLORIDE, CALCIUM CHLORIDE 600; 310; 30; 20 MG/100ML; MG/100ML; MG/100ML; MG/100ML
INJECTION, SOLUTION INTRAVENOUS CONTINUOUS
Status: DISCONTINUED | OUTPATIENT
Start: 2021-04-23 | End: 2021-04-25 | Stop reason: HOSPADM

## 2021-04-23 RX ORDER — 0.9 % SODIUM CHLORIDE 0.9 %
1000 INTRAVENOUS SOLUTION INTRAVENOUS ONCE
Status: COMPLETED | OUTPATIENT
Start: 2021-04-23 | End: 2021-04-23

## 2021-04-23 RX ORDER — MAGNESIUM SULFATE IN WATER 40 MG/ML
2000 INJECTION, SOLUTION INTRAVENOUS ONCE
Status: DISCONTINUED | OUTPATIENT
Start: 2021-04-23 | End: 2021-04-23

## 2021-04-23 RX ORDER — ACETAMINOPHEN 325 MG/1
650 TABLET ORAL EVERY 4 HOURS PRN
Status: DISCONTINUED | OUTPATIENT
Start: 2021-04-23 | End: 2021-04-25 | Stop reason: HOSPADM

## 2021-04-23 RX ORDER — ONDANSETRON 2 MG/ML
8 INJECTION INTRAMUSCULAR; INTRAVENOUS ONCE
Status: COMPLETED | OUTPATIENT
Start: 2021-04-23 | End: 2021-04-23

## 2021-04-23 RX ADMIN — KETOROLAC TROMETHAMINE 15 MG: 30 INJECTION, SOLUTION INTRAMUSCULAR; INTRAVENOUS at 16:25

## 2021-04-23 RX ADMIN — ONDANSETRON 8 MG: 2 INJECTION INTRAMUSCULAR; INTRAVENOUS at 14:09

## 2021-04-23 RX ADMIN — DIPHENHYDRAMINE HYDROCHLORIDE 25 MG: 50 INJECTION, SOLUTION INTRAMUSCULAR; INTRAVENOUS at 14:01

## 2021-04-23 RX ADMIN — SODIUM CHLORIDE 1000 ML: 9 INJECTION, SOLUTION INTRAVENOUS at 14:01

## 2021-04-23 RX ADMIN — METOCLOPRAMIDE 10 MG: 5 INJECTION, SOLUTION INTRAMUSCULAR; INTRAVENOUS at 14:01

## 2021-04-23 RX ADMIN — ACETAMINOPHEN 650 MG: 325 TABLET ORAL at 22:05

## 2021-04-23 RX ADMIN — MAGNESIUM SULFATE HEPTAHYDRATE 2 G/HR: 40 INJECTION, SOLUTION INTRAVENOUS at 18:50

## 2021-04-23 RX ADMIN — MAGNESIUM SULFATE 4000 MG: 4 INJECTION INTRAVENOUS at 14:02

## 2021-04-23 RX ADMIN — SODIUM CHLORIDE, POTASSIUM CHLORIDE, SODIUM LACTATE AND CALCIUM CHLORIDE: 600; 310; 30; 20 INJECTION, SOLUTION INTRAVENOUS at 18:45

## 2021-04-23 RX ADMIN — LABETALOL HYDROCHLORIDE 10 MG: 5 INJECTION INTRAVENOUS at 14:04

## 2021-04-23 ASSESSMENT — PAIN DESCRIPTION - FREQUENCY: FREQUENCY: CONTINUOUS

## 2021-04-23 ASSESSMENT — ENCOUNTER SYMPTOMS
ABDOMINAL PAIN: 0
EYE PAIN: 0
PHOTOPHOBIA: 0
EYE DISCHARGE: 0
ABDOMINAL DISTENTION: 0
SORE THROAT: 0
VOMITING: 0
WHEEZING: 0
COUGH: 0
BACK PAIN: 0
DIARRHEA: 0
EYE REDNESS: 0
SHORTNESS OF BREATH: 0
SINUS PRESSURE: 0
NAUSEA: 0

## 2021-04-23 ASSESSMENT — PAIN SCALES - GENERAL
PAINLEVEL_OUTOF10: 10
PAINLEVEL_OUTOF10: 10

## 2021-04-23 NOTE — CARE COORDINATION
SW Discharge Planning     SW spoke with [facility] ( [phone]) , Zelda Knight, who reported that baby CAN be discharged home to mother     PLAN    Baby CAN be discharged home when medically ready, children services WILL follow in the community     Electronically signed by Nicole Khan on 4/23/2021 at 9:30 AM

## 2021-04-23 NOTE — ED NOTES
Bed: 13  Expected date:   Expected time:   Means of arrival:   Comments:  [facility] - JOSE Mustafa.  Yolette Sanchez RN  04/23/21 3751

## 2021-04-23 NOTE — PROGRESS NOTES
38wks postpartum complaining of high blood pressures and headache. Vaginal delivery on . Pt denies any complication with this pregnancy. Pt call light within reach.

## 2021-04-23 NOTE — ED PROVIDER NOTES
Chief Complaint   Patient presents with    Hypertension    Headache       Patient is a 45-year-old female who presents 4 days after vaginal delivery for headache and hypertension. EMS states that when they arrived on scene her initial blood pressure was in the 542Y systolic. Patient was not given any meds prior to arrival.  She has no history of headaches previously. Per chart review patient had normal blood pressure around time of delivery. She states headache has been worsening for the past day. Not made better by anything specific, is made worse with bright lights. She denies chest pain or shortness of breath. She denies fevers or chills. She denies syncopal episode. The history is provided by the patient and the EMS personnel. No  was used. Review of Systems   Constitutional: Negative for chills and fever. HENT: Negative for ear pain, sinus pressure and sore throat. Eyes: Negative for photophobia, pain, discharge, redness and visual disturbance. Respiratory: Negative for cough, shortness of breath and wheezing. Cardiovascular: Negative for chest pain, palpitations and leg swelling. Gastrointestinal: Negative for abdominal distention, abdominal pain, diarrhea, nausea and vomiting. Genitourinary: Negative for dysuria and frequency. Musculoskeletal: Negative for arthralgias and back pain. Skin: Negative for rash and wound. Neurological: Positive for dizziness and headaches. Negative for syncope, weakness and numbness. Hematological: Negative for adenopathy. Psychiatric/Behavioral: Negative for behavioral problems and confusion. All other systems reviewed and are negative. Physical Exam  Vitals signs and nursing note reviewed. Constitutional:       Appearance: She is well-developed. She is obese. She is ill-appearing. She is not toxic-appearing or diaphoretic. Comments: Anxious appearing   HENT:      Head: Normocephalic and atraumatic. Eyes:      Conjunctiva/sclera: Conjunctivae normal.      Pupils: Pupils are equal, round, and reactive to light. Neck:      Musculoskeletal: Normal range of motion and neck supple. No neck rigidity or muscular tenderness. Cardiovascular:      Rate and Rhythm: Normal rate and regular rhythm. Pulses: Normal pulses. Heart sounds: Normal heart sounds. No murmur. Pulmonary:      Effort: Pulmonary effort is normal. No respiratory distress. Breath sounds: Normal breath sounds. No wheezing or rales. Abdominal:      General: Bowel sounds are normal.      Palpations: Abdomen is soft. Tenderness: There is no abdominal tenderness. There is no guarding or rebound. Musculoskeletal:         General: No tenderness. Right lower leg: No edema. Left lower leg: No edema. Skin:     General: Skin is warm and dry. Capillary Refill: Capillary refill takes less than 2 seconds. Neurological:      General: No focal deficit present. Mental Status: She is alert and oriented to person, place, and time. Cranial Nerves: No cranial nerve deficit.       Coordination: Coordination normal.      Comments: Muscle strength 5/5 in upper and lower extremities bilaterally  Sensation intact to light touch in upper and lower extremities bilaterally  Alert and oriented x3     Psychiatric:         Mood and Affect: Mood normal.         Behavior: Behavior normal.          Procedures     Labs Reviewed   CBC WITH AUTO DIFFERENTIAL - Abnormal; Notable for the following components:       Result Value    Hemoglobin 11.2 (*)     Hematocrit 33.9 (*)     All other components within normal limits   BRAIN NATRIURETIC PEPTIDE - Abnormal; Notable for the following components:    Pro- (*)     All other components within normal limits   URINALYSIS - Abnormal; Notable for the following components:    Blood, Urine MODERATE (*)     All other components within normal limits   HEPATIC FUNCTION PANEL - Abnormal; Notable for the following components:    Albumin 3.4 (*)     Alkaline Phosphatase 143 (*)     AST 32 (*)     All other components within normal limits   BASIC METABOLIC PANEL W/ REFLEX TO MG FOR LOW K   TROPONIN   MAGNESIUM   MICROSCOPIC URINALYSIS     CT Head WO Contrast   Final Result   No acute intracranial abnormality. XR CHEST PORTABLE   Final Result   Elevation of the right hemidiaphragm and the lung volumes. No obvious   cardiopulmonary pathology identified on this exam.           EKG #1:  I personally interpreted this EKG  Time:  1500    Rate: 93  Rhythm: Sinus. Interpretation: Normal sinus rhythm, normal axis. MDM  Number of Diagnoses or Management Options  Diagnosis management comments: Patient is a 82-year-old female who had vaginal delivery 4 days ago presents today for headache. On arrival she is hypertensive. There was concern for preeclampsia due to her history of hypertension. Patient was given labetalol, she was given magnesium. Lab work and imaging was obtained. Lab works within normal limits, liver enzymes are normal, platelets are normal.  CT head shows no acute intracranial abnormalities. Patient was given labetalol in the department. Patient was also given magnesium at request of OB Dr. Madhav Rodriguez. Hypertension did improve. Patient will be admitted to labor and delivery for concerns of preeclampsia. She understands and agrees this plan. Headache has improved while in the department.        Amount and/or Complexity of Data Reviewed  Clinical lab tests: reviewed  Tests in the radiology section of CPT®: reviewed  Tests in the medicine section of CPT®: reviewed             ED Course as of Apr 23 1721 Fri Apr 23, 2021   1703 Spoke with OB Dr. Madhav Rodriguez who recommends admission, he will admit to labor and delivery    [JH]      ED Course User Index  [] Rayshawn Brunner DO       --------------------------------------------- PAST HISTORY ---------------------------------------------  Past Medical History:  has a past medical history of Abnormal Pap smear of cervix, Anemia, Drug abuse ([address] Obesity, Overdose, and Preeclampsia complicating hypertension. Past Surgical History:  has no past surgical history on file. Social History:  reports that she has been smoking cigarettes. She has a 6.50 pack-year smoking history. She has never used smokeless tobacco. She reports previous drug use. Drug: Methamphetamines. She reports that she does not drink alcohol. Family History: family history is not on file. The patients home medications have been reviewed. Allergies: Patient has no known allergies.     -------------------------------------------------- RESULTS -------------------------------------------------    LABS:  Results for orders placed or performed during the hospital encounter of 04/23/21   CBC Auto Differential   Result Value Ref Range    WBC 6.8 4.5 - 11.5 E9/L    RBC 3.56 3.50 - 5.50 E12/L    Hemoglobin 11.2 (L) 11.5 - 15.5 g/dL    Hematocrit 33.9 (L) 34.0 - 48.0 %    MCV 95.2 80.0 - 99.9 fL    MCH 31.5 26.0 - 35.0 pg    MCHC 33.0 32.0 - 34.5 %    RDW 14.6 11.5 - 15.0 fL    Platelets 352 876 - 645 E9/L    MPV 10.2 7.0 - 12.0 fL    Neutrophils % 64.8 43.0 - 80.0 %    Immature Granulocytes % 0.4 0.0 - 5.0 %    Lymphocytes % 24.8 20.0 - 42.0 %    Monocytes % 7.8 2.0 - 12.0 %    Eosinophils % 2.1 0.0 - 6.0 %    Basophils % 0.1 0.0 - 2.0 %    Neutrophils Absolute 4.42 1.80 - 7.30 E9/L    Immature Granulocytes # 0.03 E9/L    Lymphocytes Absolute 1.69 1.50 - 4.00 E9/L    Monocytes Absolute 0.53 0.10 - 0.95 E9/L    Eosinophils Absolute 0.14 0.05 - 0.50 E9/L    Basophils Absolute 0.01 0.00 - 0.20 F1/T   Basic Metabolic Panel w/ Reflex to MG   Result Value Ref Range    Sodium 138 132 - 146 mmol/L    Potassium reflex Magnesium 4.1 3.5 - 5.0 mmol/L    Chloride 104 98 - 107 mmol/L    CO2 27 22 - 29 mmol/L    Anion Gap 7 7 - 16 mmol/L    Glucose 85 74 - 99 mg/dL    BUN 7 6 - 20 mg/dL ---------------------------  Date / Time Roomed:  4/23/2021  1:42 PM  ED Bed Assignment:  13/13    The nursing notes within the ED encounter and vital signs as below have been reviewed. Patient Vitals for the past 24 hrs:   BP Temp Temp src Pulse Resp SpO2 Weight   04/23/21 1715 (!) 170/99 -- -- 77 18 98 % --   04/23/21 1620 (!) 161/96 -- -- 74 18 97 % --   04/23/21 1546 (!) 160/91 -- -- 85 18 97 % --   04/23/21 1534 (!) 171/91 -- -- 89 18 97 % --   04/23/21 1452 (!) 159/84 -- -- 95 18 97 % --   04/23/21 1437 (!) 195/98 -- -- 89 20 97 % --   04/23/21 1345 (!) 175/94 97.5 °F (36.4 °C) Oral 55 18 -- 230 lb (104.3 kg)       Oxygen Saturation Interpretation: Normal    ------------------------------------------ PROGRESS NOTES ------------------------------------------    Counseling:  I have spoken with the patient and discussed todays results, in addition to providing specific details for the plan of care and counseling regarding the diagnosis and prognosis. Their questions are answered at this time and they are agreeable with the plan of admission.    --------------------------------- ADDITIONAL PROVIDER NOTES ---------------------------------  Consultations:  Spoke with Dr. Kolleen Gaucher. Discussed case. They will admit the patient. This patient's ED course included: a personal history and physicial examination    This patient has remained hemodynamically stable during their ED course. Diagnosis:  1. Preeclampsia complicating hypertension    2. Nonintractable headache, unspecified chronicity pattern, unspecified headache type        Disposition:  Patient's disposition: Admit to L&D  Patient's condition is stable.              Lyudmila Sarkar DO  Resident  04/23/21 8130

## 2021-04-24 PROCEDURE — 1220000000 HC SEMI PRIVATE OB R&B

## 2021-04-24 PROCEDURE — 6370000000 HC RX 637 (ALT 250 FOR IP): Performed by: OBSTETRICS & GYNECOLOGY

## 2021-04-24 PROCEDURE — 96366 THER/PROPH/DIAG IV INF ADDON: CPT

## 2021-04-24 PROCEDURE — 6360000002 HC RX W HCPCS: Performed by: OBSTETRICS & GYNECOLOGY

## 2021-04-24 PROCEDURE — 2580000003 HC RX 258: Performed by: OBSTETRICS & GYNECOLOGY

## 2021-04-24 PROCEDURE — G0378 HOSPITAL OBSERVATION PER HR: HCPCS

## 2021-04-24 RX ORDER — CLONIDINE HYDROCHLORIDE 0.1 MG/1
0.1 TABLET ORAL 3 TIMES DAILY
Status: DISCONTINUED | OUTPATIENT
Start: 2021-04-24 | End: 2021-04-25 | Stop reason: HOSPADM

## 2021-04-24 RX ORDER — METHADONE HYDROCHLORIDE 10 MG/ML
75 CONCENTRATE ORAL NIGHTLY
Status: DISCONTINUED | OUTPATIENT
Start: 2021-04-24 | End: 2021-04-25 | Stop reason: HOSPADM

## 2021-04-24 RX ORDER — METHADONE HYDROCHLORIDE 10 MG/ML
80 CONCENTRATE ORAL DAILY
Status: DISCONTINUED | OUTPATIENT
Start: 2021-04-24 | End: 2021-04-25 | Stop reason: HOSPADM

## 2021-04-24 RX ORDER — METHADONE HYDROCHLORIDE 10 MG/1
80 TABLET ORAL DAILY
Status: DISCONTINUED | OUTPATIENT
Start: 2021-04-24 | End: 2021-04-24

## 2021-04-24 RX ORDER — METHADONE HYDROCHLORIDE 10 MG/1
75 TABLET ORAL NIGHTLY
Status: DISCONTINUED | OUTPATIENT
Start: 2021-04-24 | End: 2021-04-24

## 2021-04-24 RX ADMIN — ACETAMINOPHEN 650 MG: 325 TABLET ORAL at 03:18

## 2021-04-24 RX ADMIN — MAGNESIUM SULFATE HEPTAHYDRATE 2 G/HR: 40 INJECTION, SOLUTION INTRAVENOUS at 15:41

## 2021-04-24 RX ADMIN — CLONIDINE HYDROCHLORIDE 0.1 MG: 0.1 TABLET ORAL at 21:49

## 2021-04-24 RX ADMIN — METHADONE HYDROCHLORIDE 80 MG: 10 CONCENTRATE ORAL at 09:15

## 2021-04-24 RX ADMIN — MAGNESIUM SULFATE HEPTAHYDRATE 2 G/HR: 40 INJECTION, SOLUTION INTRAVENOUS at 04:08

## 2021-04-24 RX ADMIN — METHADONE HYDROCHLORIDE 75 MG: 10 CONCENTRATE ORAL at 19:59

## 2021-04-24 RX ADMIN — ACETAMINOPHEN 650 MG: 325 TABLET ORAL at 15:53

## 2021-04-24 RX ADMIN — ACETAMINOPHEN 650 MG: 325 TABLET ORAL at 09:41

## 2021-04-24 RX ADMIN — ACETAMINOPHEN 650 MG: 325 TABLET ORAL at 20:02

## 2021-04-24 RX ADMIN — SODIUM CHLORIDE, POTASSIUM CHLORIDE, SODIUM LACTATE AND CALCIUM CHLORIDE: 600; 310; 30; 20 INJECTION, SOLUTION INTRAVENOUS at 09:30

## 2021-04-24 ASSESSMENT — PAIN DESCRIPTION - DESCRIPTORS
DESCRIPTORS: HEADACHE
DESCRIPTORS: HEADACHE

## 2021-04-24 ASSESSMENT — PAIN - FUNCTIONAL ASSESSMENT
PAIN_FUNCTIONAL_ASSESSMENT: 0-10
PAIN_FUNCTIONAL_ASSESSMENT: ACTIVITIES ARE NOT PREVENTED
PAIN_FUNCTIONAL_ASSESSMENT: 0-10
PAIN_FUNCTIONAL_ASSESSMENT: ACTIVITIES ARE NOT PREVENTED

## 2021-04-24 ASSESSMENT — PAIN SCALES - GENERAL
PAINLEVEL_OUTOF10: 8

## 2021-04-24 NOTE — PROGRESS NOTES
Called [facility] because patient requesting her clonidine.  Paged med dent and waiting for on call doctor to return my call

## 2021-04-24 NOTE — PROGRESS NOTES
Dr. Landry Huang called back. Updated him that patient Is requesting to take her Methadone this morning stating that she missed her dose yesterday and is having withdraws she states. States that it is okay to order. Patient states she takes 80mg in the morning and 75mg in the evening.

## 2021-04-24 NOTE — PROGRESS NOTES
Assumed patient care. Patient sleeping in bed comfortably. Mag checks q 1 hour. No further complaints at this time.

## 2021-04-24 NOTE — PROGRESS NOTES
Patient discontinued from magnesium and LR. Mojica catheter removed. Maria Alejandra care provided. BP's cycling q1hr. Patient educated to call out when getting out of bed for the first time. Patient verbalizes understanding.

## 2021-04-24 NOTE — PROGRESS NOTES
Patient sleeping comfortably in bed. No further complaints of headache at this time following Tylenol administration and eating a boxed lunch.

## 2021-04-24 NOTE — PROGRESS NOTES
Patient up to bathroom via standby assist. Voided small amount. Tolerated well.  Patient stable, no issues while walking

## 2021-04-24 NOTE — PROGRESS NOTES
OB/GYN SERVICE  Attending Post-partum progress Note      SUBJECTIVE: Patient without complaints. She denies headaches blurred vision right upper quadrant pain    OBJECTIVE      Physical    INTAKE/OUTPUT:    Intake/Output Summary (Last 24 hours) at 2021 1758  Last data filed at 2021 1705  Gross per 24 hour   Intake 2787.5 ml   Output 2525 ml   Net 262.5 ml     TEMPERATURE:  Current - Temp: 98.1 °F (36.7 °C);  Max - Temp  Av.3 °F (36.8 °C)  Min: 98.1 °F (36.7 °C)  Max: 98.5 °F (36.9 °C)  RESPIRATIONS RANGE: Resp  Av  Min: 16  Max: 16  PULSE RANGE: Pulse  Av.9  Min: 61  Max: 93  BLOOD PRESSURE RANGE:  Systolic (55GZG), TW , Min:105 , HNA:616   ; Diastolic (51YHC), VMV:36, Min:58, Max:97    CONSTITUTIONAL:  awake, alert, cooperative, no apparent distress, and appears stated age  ABDOMEN:  No scars, normal bowel sounds, soft, non-distended, non-tender, no masses palpated, no hepatosplenomegally  Extremities: No calf tenderness +2 pedal edema  Data  CBC:   Lab Results   Component Value Date    WBC 6.8 2021    RBC 3.56 2021    HGB 11.2 2021    HCT 33.9 2021    MCV 95.2 2021    MCH 31.5 2021    MCHC 33.0 2021    RDW 14.6 2021     2021    MPV 10.2 2021     CMP:    Lab Results   Component Value Date     2021    K 4.1 2021     2021    CO2 27 2021    BUN 7 2021    CREATININE 0.7 2021    GFRAA >60 2021    LABGLOM >60 2021    GLUCOSE 85 2021    PROT 7.4 2021    LABALBU 3.4 2021    CALCIUM 9.6 2021    BILITOT <0.2 2021    ALKPHOS 143 2021    AST 32 2021    ALT 26 2021     Uric Acid:  No results found for: LABURIC, URICACID  U/A:    Lab Results   Component Value Date    COLORU Yellow 2021    PROTEINU Negative 2021    PHUR 7.5 2021    WBCUA 0-1 2021    RBCUA 2-5 2021    RBCUA >20 2012    MUCUS

## 2021-04-25 VITALS
TEMPERATURE: 98.6 F | OXYGEN SATURATION: 100 % | WEIGHT: 230 LBS | RESPIRATION RATE: 22 BRPM | BODY MASS INDEX: 39.48 KG/M2 | SYSTOLIC BLOOD PRESSURE: 142 MMHG | HEART RATE: 63 BPM | DIASTOLIC BLOOD PRESSURE: 79 MMHG

## 2021-04-25 LAB
EKG ATRIAL RATE: 93 BPM
EKG P AXIS: 53 DEGREES
EKG P-R INTERVAL: 182 MS
EKG Q-T INTERVAL: 390 MS
EKG QRS DURATION: 86 MS
EKG QTC CALCULATION (BAZETT): 484 MS
EKG R AXIS: 77 DEGREES
EKG T AXIS: 69 DEGREES
EKG VENTRICULAR RATE: 93 BPM

## 2021-04-25 PROCEDURE — G0378 HOSPITAL OBSERVATION PER HR: HCPCS

## 2021-04-25 PROCEDURE — 93010 ELECTROCARDIOGRAM REPORT: CPT | Performed by: INTERNAL MEDICINE

## 2021-04-25 PROCEDURE — 6370000000 HC RX 637 (ALT 250 FOR IP): Performed by: OBSTETRICS & GYNECOLOGY

## 2021-04-25 RX ADMIN — METHADONE HYDROCHLORIDE 80 MG: 10 CONCENTRATE ORAL at 08:15

## 2021-04-25 RX ADMIN — ACETAMINOPHEN 650 MG: 325 TABLET ORAL at 00:03

## 2021-04-25 RX ADMIN — ACETAMINOPHEN 650 MG: 325 TABLET ORAL at 06:11

## 2021-04-25 RX ADMIN — CLONIDINE HYDROCHLORIDE 0.1 MG: 0.1 TABLET ORAL at 08:15

## 2021-04-25 ASSESSMENT — PAIN SCALES - GENERAL
PAINLEVEL_OUTOF10: 6
PAINLEVEL_OUTOF10: 10
PAINLEVEL_OUTOF10: 10

## 2021-04-25 NOTE — PROGRESS NOTES
Assumed care. Pt resting well, baby with mom. Pt states she has a mild headache, will want tylenol with PRN due. No other complaints. Call light within reach.

## 2021-04-25 NOTE — PROGRESS NOTES
Spoke to Dr Kareem Singh about patients Clonidine. Patient states she takes clonidine 0.1 mg three times a day, through [place]. Unable to verify with [place] at this time. Order received from Dr Kareem Singh for patient stated dose.

## 2021-04-25 NOTE — PROGRESS NOTES
Patient given discharge instructions with hypertension s/s and precautions, instructed to make f/u appointment with Dr. Adolfo Alicea this week. Patient verbalizes understanding, states no questions or concerns. Currently waiting for d/c instructions for baby. Will notify nurse when exiting unit.

## 2021-04-25 NOTE — PROGRESS NOTES
Dr. Maicol Rowell updated on BP. Order to cycle BPs q 15 minutes. Will be in today to round on patient.

## 2021-04-25 NOTE — PROGRESS NOTES
Dr. Landry Huang updated: patient requesting to be discharged d/t transportation. BPs reviewed, also notified pt c/o constipation. Order received for discharge, patient may take OTC medications as needed for constipation, to f/u in office this week. 75

## 2021-04-26 LAB
EDDP, URINE: >5000 NG/ML
METHADONE, URINE: 1411 NG/ML

## 2021-04-27 NOTE — CARE COORDINATION
This note will not be viewable in MyChart for the following reason(s). Siren: Unable to separate mother vs.  PHI  SW Discharge Planning     SW noted baby's cordstat was positive for Methadone EDDP and Gabapentin.  SW called [facility] [place] ( [phone]) and reported information to , Santana Steele     Electronically signed by CHUCKY Braun on 2021 at 10:59 AM

## 2021-08-05 ENCOUNTER — APPOINTMENT (OUTPATIENT)
Dept: ULTRASOUND IMAGING | Age: 31
End: 2021-08-05
Payer: MEDICAID

## 2021-08-05 ENCOUNTER — HOSPITAL ENCOUNTER (EMERGENCY)
Age: 31
Discharge: HOME | End: 2021-08-05
Attending: STUDENT IN AN ORGANIZED HEALTH CARE EDUCATION/TRAINING PROGRAM
Payer: MEDICAID

## 2021-08-05 ENCOUNTER — APPOINTMENT (OUTPATIENT)
Dept: CT IMAGING | Age: 31
End: 2021-08-05
Payer: MEDICAID

## 2021-08-05 VITALS
BODY MASS INDEX: 39.27 KG/M2 | HEIGHT: 64 IN | OXYGEN SATURATION: 96 % | WEIGHT: 230 LBS | HEART RATE: 91 BPM | DIASTOLIC BLOOD PRESSURE: 57 MMHG | TEMPERATURE: 98.9 F | SYSTOLIC BLOOD PRESSURE: 120 MMHG | RESPIRATION RATE: 16 BRPM

## 2021-08-05 DIAGNOSIS — M79.89 LEG SWELLING: ICD-10-CM

## 2021-08-05 DIAGNOSIS — M54.50 LUMBAR PAIN: Primary | ICD-10-CM

## 2021-08-05 LAB
ACETAMINOPHEN LEVEL: <5 MCG/ML (ref 10–30)
ALBUMIN SERPL-MCNC: 3.4 G/DL (ref 3.5–5.2)
ALP BLD-CCNC: 116 U/L (ref 35–104)
ALT SERPL-CCNC: 39 U/L (ref 0–32)
AMPHETAMINE SCREEN, URINE: NOT DETECTED
ANION GAP SERPL CALCULATED.3IONS-SCNC: 9 MMOL/L (ref 7–16)
AST SERPL-CCNC: 34 U/L (ref 0–31)
BARBITURATE SCREEN URINE: NOT DETECTED
BASOPHILS ABSOLUTE: 0.01 E9/L (ref 0–0.2)
BASOPHILS RELATIVE PERCENT: 0.2 % (ref 0–2)
BENZODIAZEPINE SCREEN, URINE: NOT DETECTED
BILIRUB SERPL-MCNC: <0.2 MG/DL (ref 0–1.2)
BILIRUBIN URINE: NEGATIVE
BLOOD, URINE: NEGATIVE
BUN BLDV-MCNC: 8 MG/DL (ref 6–20)
CALCIUM SERPL-MCNC: 9.1 MG/DL (ref 8.6–10.2)
CANNABINOID SCREEN URINE: NOT DETECTED
CHLORIDE BLD-SCNC: 104 MMOL/L (ref 98–107)
CLARITY: CLEAR
CO2: 26 MMOL/L (ref 22–29)
COCAINE METABOLITE SCREEN URINE: NOT DETECTED
COLOR: YELLOW
CREAT SERPL-MCNC: 0.7 MG/DL (ref 0.5–1)
EOSINOPHILS ABSOLUTE: 0.18 E9/L (ref 0.05–0.5)
EOSINOPHILS RELATIVE PERCENT: 3.8 % (ref 0–6)
ETHANOL: <10 MG/DL (ref 0–0.08)
FENTANYL SCREEN, URINE: NOT DETECTED
GFR AFRICAN AMERICAN: >60
GFR NON-AFRICAN AMERICAN: >60 ML/MIN/1.73
GLUCOSE BLD-MCNC: 137 MG/DL (ref 74–99)
GLUCOSE URINE: NEGATIVE MG/DL
HCG(URINE) PREGNANCY TEST: NEGATIVE
HCT VFR BLD CALC: 34.9 % (ref 34–48)
HEMOGLOBIN: 11.6 G/DL (ref 11.5–15.5)
IMMATURE GRANULOCYTES #: 0.01 E9/L
IMMATURE GRANULOCYTES %: 0.2 % (ref 0–5)
KETONES, URINE: ABNORMAL MG/DL
LACTIC ACID: 1.6 MMOL/L (ref 0.5–2.2)
LEUKOCYTE ESTERASE, URINE: NEGATIVE
LIPASE: 13 U/L (ref 13–60)
LYMPHOCYTES ABSOLUTE: 1.83 E9/L (ref 1.5–4)
LYMPHOCYTES RELATIVE PERCENT: 38.4 % (ref 20–42)
Lab: ABNORMAL
MCH RBC QN AUTO: 32 PG (ref 26–35)
MCHC RBC AUTO-ENTMCNC: 33.2 % (ref 32–34.5)
MCV RBC AUTO: 96.4 FL (ref 80–99.9)
METHADONE SCREEN, URINE: POSITIVE
MONOCYTES ABSOLUTE: 0.65 E9/L (ref 0.1–0.95)
MONOCYTES RELATIVE PERCENT: 13.6 % (ref 2–12)
NEUTROPHILS ABSOLUTE: 2.09 E9/L (ref 1.8–7.3)
NEUTROPHILS RELATIVE PERCENT: 43.8 % (ref 43–80)
NITRITE, URINE: NEGATIVE
OPIATE SCREEN URINE: NOT DETECTED
OXYCODONE URINE: NOT DETECTED
PDW BLD-RTO: 13.2 FL (ref 11.5–15)
PH UA: 5.5 (ref 5–9)
PHENCYCLIDINE SCREEN URINE: NOT DETECTED
PLATELET # BLD: 270 E9/L (ref 130–450)
PMV BLD AUTO: 9.2 FL (ref 7–12)
POTASSIUM REFLEX MAGNESIUM: 3.7 MMOL/L (ref 3.5–5)
PROTEIN UA: NEGATIVE MG/DL
RBC # BLD: 3.62 E12/L (ref 3.5–5.5)
SALICYLATE, SERUM: <0.3 MG/DL (ref 0–30)
SODIUM BLD-SCNC: 139 MMOL/L (ref 132–146)
SPECIFIC GRAVITY UA: >=1.03 (ref 1–1.03)
TOTAL PROTEIN: 7 G/DL (ref 6.4–8.3)
TRICYCLIC ANTIDEPRESSANTS SCREEN SERUM: NEGATIVE NG/ML
UROBILINOGEN, URINE: 0.2 E.U./DL
WBC # BLD: 4.8 E9/L (ref 4.5–11.5)

## 2021-08-05 PROCEDURE — 96372 THER/PROPH/DIAG INJ SC/IM: CPT

## 2021-08-05 PROCEDURE — 81025 URINE PREGNANCY TEST: CPT

## 2021-08-05 PROCEDURE — 70450 CT HEAD/BRAIN W/O DYE: CPT

## 2021-08-05 PROCEDURE — 6360000002 HC RX W HCPCS: Performed by: STUDENT IN AN ORGANIZED HEALTH CARE EDUCATION/TRAINING PROGRAM

## 2021-08-05 PROCEDURE — 80053 COMPREHEN METABOLIC PANEL: CPT

## 2021-08-05 PROCEDURE — 93005 ELECTROCARDIOGRAM TRACING: CPT | Performed by: STUDENT IN AN ORGANIZED HEALTH CARE EDUCATION/TRAINING PROGRAM

## 2021-08-05 PROCEDURE — 74177 CT ABD & PELVIS W/CONTRAST: CPT

## 2021-08-05 PROCEDURE — 72131 CT LUMBAR SPINE W/O DYE: CPT

## 2021-08-05 PROCEDURE — 2580000003 HC RX 258: Performed by: RADIOLOGY

## 2021-08-05 PROCEDURE — 6370000000 HC RX 637 (ALT 250 FOR IP): Performed by: STUDENT IN AN ORGANIZED HEALTH CARE EDUCATION/TRAINING PROGRAM

## 2021-08-05 PROCEDURE — 93970 EXTREMITY STUDY: CPT

## 2021-08-05 PROCEDURE — 2580000003 HC RX 258: Performed by: STUDENT IN AN ORGANIZED HEALTH CARE EDUCATION/TRAINING PROGRAM

## 2021-08-05 PROCEDURE — 96375 TX/PRO/DX INJ NEW DRUG ADDON: CPT

## 2021-08-05 PROCEDURE — 83605 ASSAY OF LACTIC ACID: CPT

## 2021-08-05 PROCEDURE — 85025 COMPLETE CBC W/AUTO DIFF WBC: CPT

## 2021-08-05 PROCEDURE — 80143 DRUG ASSAY ACETAMINOPHEN: CPT

## 2021-08-05 PROCEDURE — 82077 ASSAY SPEC XCP UR&BREATH IA: CPT

## 2021-08-05 PROCEDURE — 83690 ASSAY OF LIPASE: CPT

## 2021-08-05 PROCEDURE — 80307 DRUG TEST PRSMV CHEM ANLYZR: CPT

## 2021-08-05 PROCEDURE — 6360000004 HC RX CONTRAST MEDICATION: Performed by: RADIOLOGY

## 2021-08-05 PROCEDURE — 80179 DRUG ASSAY SALICYLATE: CPT

## 2021-08-05 PROCEDURE — 81003 URINALYSIS AUTO W/O SCOPE: CPT

## 2021-08-05 PROCEDURE — 99284 EMERGENCY DEPT VISIT MOD MDM: CPT

## 2021-08-05 PROCEDURE — 96374 THER/PROPH/DIAG INJ IV PUSH: CPT

## 2021-08-05 RX ORDER — KETOROLAC TROMETHAMINE 10 MG/1
10 TABLET, FILM COATED ORAL EVERY 6 HOURS PRN
Qty: 20 TABLET | Refills: 0 | Status: SHIPPED | OUTPATIENT
Start: 2021-08-05 | End: 2021-09-06 | Stop reason: ALTCHOICE

## 2021-08-05 RX ORDER — ACETAMINOPHEN 325 MG/1
650 TABLET ORAL ONCE
Status: DISCONTINUED | OUTPATIENT
Start: 2021-08-05 | End: 2021-08-06 | Stop reason: HOSPADM

## 2021-08-05 RX ORDER — ORPHENADRINE CITRATE 30 MG/ML
60 INJECTION INTRAMUSCULAR; INTRAVENOUS EVERY 12 HOURS
Status: DISCONTINUED | OUTPATIENT
Start: 2021-08-05 | End: 2021-08-06 | Stop reason: HOSPADM

## 2021-08-05 RX ORDER — LIDOCAINE 4 G/G
1 PATCH TOPICAL ONCE
Status: DISCONTINUED | OUTPATIENT
Start: 2021-08-05 | End: 2021-08-06 | Stop reason: HOSPADM

## 2021-08-05 RX ORDER — DIPHENHYDRAMINE HYDROCHLORIDE 50 MG/ML
25 INJECTION INTRAMUSCULAR; INTRAVENOUS ONCE
Status: COMPLETED | OUTPATIENT
Start: 2021-08-05 | End: 2021-08-05

## 2021-08-05 RX ORDER — KETOROLAC TROMETHAMINE 30 MG/ML
15 INJECTION, SOLUTION INTRAMUSCULAR; INTRAVENOUS ONCE
Status: COMPLETED | OUTPATIENT
Start: 2021-08-05 | End: 2021-08-05

## 2021-08-05 RX ORDER — METOCLOPRAMIDE HYDROCHLORIDE 5 MG/ML
10 INJECTION INTRAMUSCULAR; INTRAVENOUS ONCE
Status: COMPLETED | OUTPATIENT
Start: 2021-08-05 | End: 2021-08-05

## 2021-08-05 RX ORDER — 0.9 % SODIUM CHLORIDE 0.9 %
1000 INTRAVENOUS SOLUTION INTRAVENOUS ONCE
Status: COMPLETED | OUTPATIENT
Start: 2021-08-05 | End: 2021-08-05

## 2021-08-05 RX ORDER — LIDOCAINE 4 G/G
1 PATCH TOPICAL DAILY
Qty: 15 PATCH | Refills: 0 | Status: SHIPPED | OUTPATIENT
Start: 2021-08-05 | End: 2021-08-20

## 2021-08-05 RX ORDER — LIDOCAINE 4 G/G
1 PATCH TOPICAL DAILY
Status: DISCONTINUED | OUTPATIENT
Start: 2021-08-06 | End: 2021-08-05

## 2021-08-05 RX ORDER — SODIUM CHLORIDE 0.9 % (FLUSH) 0.9 %
10 SYRINGE (ML) INJECTION PRN
Status: COMPLETED | OUTPATIENT
Start: 2021-08-05 | End: 2021-08-05

## 2021-08-05 RX ADMIN — KETOROLAC TROMETHAMINE 15 MG: 30 INJECTION, SOLUTION INTRAMUSCULAR; INTRAVENOUS at 21:57

## 2021-08-05 RX ADMIN — DIPHENHYDRAMINE HYDROCHLORIDE 25 MG: 50 INJECTION, SOLUTION INTRAMUSCULAR; INTRAVENOUS at 16:02

## 2021-08-05 RX ADMIN — SODIUM CHLORIDE 1000 ML: 9 INJECTION, SOLUTION INTRAVENOUS at 16:02

## 2021-08-05 RX ADMIN — IOPAMIDOL 75 ML: 755 INJECTION, SOLUTION INTRAVENOUS at 20:17

## 2021-08-05 RX ADMIN — ORPHENADRINE CITRATE 60 MG: 30 INJECTION INTRAMUSCULAR; INTRAVENOUS at 19:43

## 2021-08-05 RX ADMIN — METOCLOPRAMIDE 10 MG: 5 INJECTION, SOLUTION INTRAMUSCULAR; INTRAVENOUS at 16:04

## 2021-08-05 RX ADMIN — Medication 10 ML: at 20:12

## 2021-08-05 ASSESSMENT — PAIN DESCRIPTION - FREQUENCY: FREQUENCY: CONTINUOUS

## 2021-08-05 ASSESSMENT — PAIN DESCRIPTION - LOCATION: LOCATION: BACK

## 2021-08-05 ASSESSMENT — PAIN DESCRIPTION - ORIENTATION: ORIENTATION: LOWER

## 2021-08-05 ASSESSMENT — PAIN DESCRIPTION - ONSET: ONSET: ON-GOING

## 2021-08-05 ASSESSMENT — PAIN DESCRIPTION - DESCRIPTORS: DESCRIPTORS: SHARP;DISCOMFORT

## 2021-08-05 ASSESSMENT — PAIN DESCRIPTION - PROGRESSION: CLINICAL_PROGRESSION: NOT CHANGED

## 2021-08-05 ASSESSMENT — PAIN SCALES - GENERAL
PAINLEVEL_OUTOF10: 9
PAINLEVEL_OUTOF10: 9

## 2021-08-05 ASSESSMENT — PAIN DESCRIPTION - PAIN TYPE: TYPE: ACUTE PAIN

## 2021-08-05 NOTE — ED PROVIDER NOTES
29-year-old female presenting for back pain and headache. Patient is a poor historian. She states she has been having back pain since she gave birth in April and had epidural.  Pain is worsening since then and is exacerbated by movement. It is located in her lower back on both sides and radiates down the back of both legs. She comes in today as she states she just cannot take the pain anymore. She also complains of a migraine that started today. She said she has been having headaches off and on since she birth in April. She complains of occasional blurry vision. She also complains of leg swelling that has been going on since she gave birth. She endorses numbness in her right first toe. She endorses diarrhea yesterday and today, only one episode each day. She also endorses lower abdominal pain. She denies any fever, chills, chest pain, shortness of breath, nausea, emesis, urinary retention, bowel incontinence, and saddle anesthesia. Review of Systems   Constitutional: Negative for chills and fever. HENT: Negative for congestion and sore throat. Eyes: Positive for visual disturbance. Negative for photophobia. Respiratory: Negative for cough and shortness of breath. Cardiovascular: Positive for leg swelling. Negative for chest pain. Gastrointestinal: Positive for abdominal pain and diarrhea. Negative for nausea and vomiting. Genitourinary: Negative for dysuria and flank pain. Musculoskeletal: Positive for back pain. Neurological: Positive for numbness and headaches. Negative for dizziness. Physical Exam  Constitutional:       General: She is not in acute distress. Appearance: She is obese. She is not ill-appearing or toxic-appearing. Comments: Patient sleeping, somewhat difficult to wake up initially, though did become alert   HENT:      Head: Normocephalic.       Right Ear: External ear normal.      Left Ear: External ear normal.      Nose: Nose normal.   Eyes: U/L   Lipase   Result Value Ref Range    Lipase 13 13 - 60 U/L   Urinalysis, reflex to microscopic   Result Value Ref Range    Color, UA Yellow Straw/Yellow    Clarity, UA Clear Clear    Glucose, Ur Negative Negative mg/dL    Bilirubin Urine Negative Negative    Ketones, Urine TRACE (A) Negative mg/dL    Specific Gravity, UA >=1.030 1.005 - 1.030    Blood, Urine Negative Negative    pH, UA 5.5 5.0 - 9.0    Protein, UA Negative Negative mg/dL    Urobilinogen, Urine 0.2 <2.0 E.U./dL    Nitrite, Urine Negative Negative    Leukocyte Esterase, Urine Negative Negative   Lactic Acid, Plasma   Result Value Ref Range    Lactic Acid 1.6 0.5 - 2.2 mmol/L   URINE DRUG SCREEN   Result Value Ref Range    Amphetamine Screen, Urine NOT DETECTED Negative <1000 ng/mL    Barbiturate Screen, Ur NOT DETECTED Negative < 200 ng/mL    Benzodiazepine Screen, Urine NOT DETECTED Negative < 200 ng/mL    Cannabinoid Scrn, Ur NOT DETECTED Negative < 50ng/mL    Cocaine Metabolite Screen, Urine NOT DETECTED Negative < 300 ng/mL    Opiate Scrn, Ur NOT DETECTED Negative < 300ng/mL    PCP Screen, Urine NOT DETECTED Negative < 25 ng/mL    Methadone Screen, Urine POSITIVE (A) Negative <300 ng/mL    Oxycodone Urine NOT DETECTED Negative <100 ng/mL    FENTANYL SCREEN, URINE NOT DETECTED Negative <1 ng/mL    Drug Screen Comment: see below    Serum Drug Screen   Result Value Ref Range    Ethanol Lvl <10 mg/dL    Acetaminophen Level <5.0 (L) 10.0 - 70.3 mcg/mL    Salicylate, Serum <3.3 0.0 - 30.0 mg/dL    TCA Scrn NEGATIVE Cutoff:300 ng/mL   Pregnancy, Urine   Result Value Ref Range    HCG(Urine) Pregnancy Test NEGATIVE NEGATIVE   EKG 12 Lead   Result Value Ref Range    Ventricular Rate 80 BPM    Atrial Rate 80 BPM    P-R Interval 178 ms    QRS Duration 82 ms    Q-T Interval 420 ms    QTc Calculation (Bazett) 484 ms    P Axis 21 degrees    R Axis 50 degrees    T Axis 14 degrees       Radiology:  CT ABDOMEN PELVIS W IV CONTRAST Additional Contrast? None Final Result   No acute findings in the abdomen or pelvis. CT LUMBAR SPINE WO CONTRAST   Final Result   No acute findings in the lumbar spine. CT Head WO Contrast   Final Result   No acute intracranial abnormality. US DUP LOWER EXTREMITIES BILATERAL VENOUS   Final Result   No evidence of DVT in either lower extremity. EKG:   Rate: 80  Rhythm: sinus  Axis: normal  Interpretation: T wave flattening in all leads  Comparison: changes compared to previous  ------------------------- NURSING NOTES AND VITALS REVIEWED ---------------------------  Date / Time Roomed:  8/5/2021  2:39 PM  ED Bed Assignment:  09/09    The nursing notes within the ED encounter and vital signs as below have been reviewed. BP (!) 120/57   Pulse 91   Temp 98.9 °F (37.2 °C) (Axillary)   Resp 16   Ht 5' 4\" (1.626 m)   Wt 230 lb (104.3 kg)   SpO2 96%   BMI 39.48 kg/m²   Oxygen Saturation Interpretation: Normal      ------------------------------------------ PROGRESS NOTES ------------------------------------------    I have spoken with the patient and discussed todays results, in addition to providing specific details for the plan of care and counseling regarding the diagnosis and prognosis. Their questions are answered at this time and they are agreeable with the plan. I discussed at length with them reasons for immediate return here for re evaluation. They will followup with their primary care physician by calling their office tomorrow. --------------------------------- ADDITIONAL PROVIDER NOTES ---------------------------------  At this time the patient is without objective evidence of an acute process requiring hospitalization or inpatient management. They have remained hemodynamically stable throughout their entire ED visit and are stable for discharge with outpatient follow-up.      The plan has been discussed in detail and they are aware of the specific conditions for emergent return, as well as the importance of follow-up. Discharge Medication List as of 8/5/2021 10:10 PM      START taking these medications    Details   lidocaine 4 % external patch Place 1 patch onto the skin daily for 15 days, Transdermal, DAILY Starting Thu 8/5/2021, Until Fri 8/20/2021, For 15 days, Disp-15 patch, R-0, Print      ketorolac (TORADOL) 10 MG tablet Take 1 tablet by mouth every 6 hours as needed for Pain, Disp-20 tablet, R-0Print             Diagnosis:  1. Lumbar pain    2. Leg swelling        Disposition:  Patient's disposition: Discharge to home  Patient's condition is stable.           Indiana Ashby MD  Resident  08/08/21 7110

## 2021-08-05 NOTE — ED NOTES
Patient unable to urinate at this time; instructed to use call light when able to urinate.      Hemal Hussein RN  08/05/21 8119

## 2021-08-05 NOTE — ED NOTES
Patient unable to urinate at this time; instructed to inform RN when able to urinate.      Libia Appiah RN  08/05/21 5817

## 2021-08-06 LAB
EKG ATRIAL RATE: 80 BPM
EKG P AXIS: 21 DEGREES
EKG P-R INTERVAL: 178 MS
EKG Q-T INTERVAL: 420 MS
EKG QRS DURATION: 82 MS
EKG QTC CALCULATION (BAZETT): 484 MS
EKG R AXIS: 50 DEGREES
EKG T AXIS: 14 DEGREES
EKG VENTRICULAR RATE: 80 BPM

## 2021-08-06 PROCEDURE — 93010 ELECTROCARDIOGRAM REPORT: CPT | Performed by: INTERNAL MEDICINE

## 2021-08-08 ASSESSMENT — ENCOUNTER SYMPTOMS
PHOTOPHOBIA: 0
ABDOMINAL PAIN: 1
SHORTNESS OF BREATH: 0
SORE THROAT: 0
BACK PAIN: 1
NAUSEA: 0
DIARRHEA: 1
COUGH: 0
VOMITING: 0

## 2021-09-06 ENCOUNTER — HOSPITAL ENCOUNTER (EMERGENCY)
Age: 31
Discharge: HOME | End: 2021-09-06
Attending: STUDENT IN AN ORGANIZED HEALTH CARE EDUCATION/TRAINING PROGRAM
Payer: MEDICAID

## 2021-09-06 ENCOUNTER — APPOINTMENT (OUTPATIENT)
Dept: GENERAL RADIOLOGY | Age: 31
End: 2021-09-06
Payer: MEDICAID

## 2021-09-06 ENCOUNTER — HOSPITAL ENCOUNTER (EMERGENCY)
Age: 31
Discharge: HOME | End: 2021-09-06
Payer: MEDICAID

## 2021-09-06 VITALS
RESPIRATION RATE: 16 BRPM | DIASTOLIC BLOOD PRESSURE: 91 MMHG | HEART RATE: 56 BPM | OXYGEN SATURATION: 98 % | SYSTOLIC BLOOD PRESSURE: 171 MMHG | HEIGHT: 64 IN | WEIGHT: 230 LBS | BODY MASS INDEX: 39.27 KG/M2 | TEMPERATURE: 98 F

## 2021-09-06 VITALS
TEMPERATURE: 98 F | BODY MASS INDEX: 39.27 KG/M2 | SYSTOLIC BLOOD PRESSURE: 136 MMHG | HEIGHT: 64 IN | HEART RATE: 80 BPM | OXYGEN SATURATION: 99 % | DIASTOLIC BLOOD PRESSURE: 75 MMHG | WEIGHT: 230 LBS | RESPIRATION RATE: 16 BRPM

## 2021-09-06 DIAGNOSIS — F11.93 OPIOID WITHDRAWAL (HCC): Primary | ICD-10-CM

## 2021-09-06 DIAGNOSIS — F11.93 OPIOID WITHDRAWAL (HCC): ICD-10-CM

## 2021-09-06 DIAGNOSIS — R00.2 PALPITATIONS: ICD-10-CM

## 2021-09-06 DIAGNOSIS — Z76.89 ENCOUNTER FOR MEDICATION ADMINISTRATION: Primary | ICD-10-CM

## 2021-09-06 DIAGNOSIS — R07.9 CHEST PAIN, UNSPECIFIED TYPE: ICD-10-CM

## 2021-09-06 LAB
ACETAMINOPHEN LEVEL: <5 MCG/ML (ref 10–30)
ALBUMIN SERPL-MCNC: 3.8 G/DL (ref 3.5–5.2)
ALP BLD-CCNC: 110 U/L (ref 35–104)
ALT SERPL-CCNC: 37 U/L (ref 0–32)
AMPHETAMINE SCREEN, URINE: NOT DETECTED
ANION GAP SERPL CALCULATED.3IONS-SCNC: 10 MMOL/L (ref 7–16)
AST SERPL-CCNC: 49 U/L (ref 0–31)
BARBITURATE SCREEN URINE: NOT DETECTED
BENZODIAZEPINE SCREEN, URINE: NOT DETECTED
BILIRUB SERPL-MCNC: <0.2 MG/DL (ref 0–1.2)
BUN BLDV-MCNC: 6 MG/DL (ref 6–20)
CALCIUM SERPL-MCNC: 9.5 MG/DL (ref 8.6–10.2)
CANNABINOID SCREEN URINE: NOT DETECTED
CHLORIDE BLD-SCNC: 101 MMOL/L (ref 98–107)
CO2: 27 MMOL/L (ref 22–29)
COCAINE METABOLITE SCREEN URINE: NOT DETECTED
CREAT SERPL-MCNC: 0.7 MG/DL (ref 0.5–1)
ETHANOL: <10 MG/DL (ref 0–0.08)
FENTANYL SCREEN, URINE: NOT DETECTED
GFR AFRICAN AMERICAN: >60
GFR NON-AFRICAN AMERICAN: >60 ML/MIN/1.73
GLUCOSE BLD-MCNC: 104 MG/DL (ref 74–99)
HCG, URINE, POC: NEGATIVE
HCT VFR BLD CALC: 35.6 % (ref 34–48)
HEMOGLOBIN: 12 G/DL (ref 11.5–15.5)
Lab: ABNORMAL
Lab: NORMAL
MCH RBC QN AUTO: 31.7 PG (ref 26–35)
MCHC RBC AUTO-ENTMCNC: 33.7 % (ref 32–34.5)
MCV RBC AUTO: 94.2 FL (ref 80–99.9)
METHADONE SCREEN, URINE: POSITIVE
NEGATIVE QC PASS/FAIL: NORMAL
OPIATE SCREEN URINE: NOT DETECTED
OXYCODONE URINE: NOT DETECTED
PDW BLD-RTO: 12.8 FL (ref 11.5–15)
PHENCYCLIDINE SCREEN URINE: NOT DETECTED
PLATELET # BLD: 313 E9/L (ref 130–450)
PMV BLD AUTO: 9 FL (ref 7–12)
POSITIVE QC PASS/FAIL: NORMAL
POTASSIUM SERPL-SCNC: 4 MMOL/L (ref 3.5–5)
RBC # BLD: 3.78 E12/L (ref 3.5–5.5)
SALICYLATE, SERUM: <0.3 MG/DL (ref 0–30)
SODIUM BLD-SCNC: 138 MMOL/L (ref 132–146)
TOTAL PROTEIN: 8.2 G/DL (ref 6.4–8.3)
TRICYCLIC ANTIDEPRESSANTS SCREEN SERUM: NEGATIVE NG/ML
TROPONIN, HIGH SENSITIVITY: <6 NG/L (ref 0–9)
WBC # BLD: 8.9 E9/L (ref 4.5–11.5)

## 2021-09-06 PROCEDURE — 2580000003 HC RX 258: Performed by: EMERGENCY MEDICINE

## 2021-09-06 PROCEDURE — 96374 THER/PROPH/DIAG INJ IV PUSH: CPT

## 2021-09-06 PROCEDURE — 96372 THER/PROPH/DIAG INJ SC/IM: CPT

## 2021-09-06 PROCEDURE — 6360000002 HC RX W HCPCS: Performed by: EMERGENCY MEDICINE

## 2021-09-06 PROCEDURE — 93005 ELECTROCARDIOGRAM TRACING: CPT | Performed by: NURSE PRACTITIONER

## 2021-09-06 PROCEDURE — 96375 TX/PRO/DX INJ NEW DRUG ADDON: CPT

## 2021-09-06 PROCEDURE — 82077 ASSAY SPEC XCP UR&BREATH IA: CPT

## 2021-09-06 PROCEDURE — 80179 DRUG ASSAY SALICYLATE: CPT

## 2021-09-06 PROCEDURE — 6360000002 HC RX W HCPCS: Performed by: NURSE PRACTITIONER

## 2021-09-06 PROCEDURE — 80143 DRUG ASSAY ACETAMINOPHEN: CPT

## 2021-09-06 PROCEDURE — 80053 COMPREHEN METABOLIC PANEL: CPT

## 2021-09-06 PROCEDURE — 71045 X-RAY EXAM CHEST 1 VIEW: CPT

## 2021-09-06 PROCEDURE — 85027 COMPLETE CBC AUTOMATED: CPT

## 2021-09-06 PROCEDURE — 99283 EMERGENCY DEPT VISIT LOW MDM: CPT

## 2021-09-06 PROCEDURE — 84484 ASSAY OF TROPONIN QUANT: CPT

## 2021-09-06 PROCEDURE — 80307 DRUG TEST PRSMV CHEM ANLYZR: CPT

## 2021-09-06 RX ORDER — 0.9 % SODIUM CHLORIDE 0.9 %
1000 INTRAVENOUS SOLUTION INTRAVENOUS ONCE
Status: DISCONTINUED | OUTPATIENT
Start: 2021-09-06 | End: 2021-09-07 | Stop reason: HOSPADM

## 2021-09-06 RX ORDER — ACETAMINOPHEN 500 MG
500 TABLET ORAL 4 TIMES DAILY PRN
Qty: 120 TABLET | Refills: 0 | Status: SHIPPED | OUTPATIENT
Start: 2021-09-06 | End: 2022-05-02

## 2021-09-06 RX ORDER — DIPHENHYDRAMINE HYDROCHLORIDE 50 MG/ML
25 INJECTION INTRAMUSCULAR; INTRAVENOUS ONCE
Status: COMPLETED | OUTPATIENT
Start: 2021-09-06 | End: 2021-09-06

## 2021-09-06 RX ORDER — ONDANSETRON 4 MG/1
4 TABLET, ORALLY DISINTEGRATING ORAL EVERY 8 HOURS PRN
Qty: 10 TABLET | Refills: 0 | Status: SHIPPED | OUTPATIENT
Start: 2021-09-06 | End: 2021-12-17 | Stop reason: SDUPTHER

## 2021-09-06 RX ORDER — METOCLOPRAMIDE HYDROCHLORIDE 5 MG/ML
10 INJECTION INTRAMUSCULAR; INTRAVENOUS ONCE
Status: COMPLETED | OUTPATIENT
Start: 2021-09-06 | End: 2021-09-06

## 2021-09-06 RX ORDER — 0.9 % SODIUM CHLORIDE 0.9 %
1000 INTRAVENOUS SOLUTION INTRAVENOUS ONCE
Status: COMPLETED | OUTPATIENT
Start: 2021-09-06 | End: 2021-09-06

## 2021-09-06 RX ORDER — PROMETHAZINE HYDROCHLORIDE 25 MG/ML
25 INJECTION, SOLUTION INTRAMUSCULAR; INTRAVENOUS ONCE
Status: COMPLETED | OUTPATIENT
Start: 2021-09-06 | End: 2021-09-06

## 2021-09-06 RX ORDER — KETOROLAC TROMETHAMINE 30 MG/ML
15 INJECTION, SOLUTION INTRAMUSCULAR; INTRAVENOUS ONCE
Status: COMPLETED | OUTPATIENT
Start: 2021-09-06 | End: 2021-09-06

## 2021-09-06 RX ORDER — BUPRENORPHINE HYDROCHLORIDE AND NALOXONE HYDROCHLORIDE DIHYDRATE 8; 2 MG/1; MG/1
1 TABLET SUBLINGUAL ONCE
Status: COMPLETED | OUTPATIENT
Start: 2021-09-06 | End: 2021-09-06

## 2021-09-06 RX ORDER — CLONIDINE HYDROCHLORIDE 0.1 MG/1
0.1 TABLET ORAL ONCE
Status: DISCONTINUED | OUTPATIENT
Start: 2021-09-06 | End: 2021-09-07 | Stop reason: HOSPADM

## 2021-09-06 RX ADMIN — KETOROLAC TROMETHAMINE 15 MG: 30 INJECTION, SOLUTION INTRAMUSCULAR; INTRAVENOUS at 20:33

## 2021-09-06 RX ADMIN — METOCLOPRAMIDE HYDROCHLORIDE 10 MG: 5 INJECTION INTRAMUSCULAR; INTRAVENOUS at 20:34

## 2021-09-06 RX ADMIN — PROMETHAZINE HYDROCHLORIDE 25 MG: 25 INJECTION INTRAMUSCULAR; INTRAVENOUS at 19:25

## 2021-09-06 RX ADMIN — BUPRENORPHINE HYDROCHLORIDE AND NALOXONE HYDROCHLORIDE DIHYDRATE 1 TABLET: 8; 2 TABLET SUBLINGUAL at 14:06

## 2021-09-06 RX ADMIN — DIPHENHYDRAMINE HYDROCHLORIDE 25 MG: 50 INJECTION, SOLUTION INTRAMUSCULAR; INTRAVENOUS at 20:34

## 2021-09-06 RX ADMIN — SODIUM CHLORIDE 1000 ML: 9 INJECTION, SOLUTION INTRAVENOUS at 19:49

## 2021-09-06 ASSESSMENT — PAIN SCALES - GENERAL
PAINLEVEL_OUTOF10: 10

## 2021-09-06 ASSESSMENT — PAIN DESCRIPTION - PAIN TYPE: TYPE: ACUTE PAIN

## 2021-09-06 ASSESSMENT — PAIN DESCRIPTION - DESCRIPTORS: DESCRIPTORS: ACHING

## 2021-09-06 ASSESSMENT — PAIN DESCRIPTION - LOCATION: LOCATION: GENERALIZED

## 2021-09-06 NOTE — ED TRIAGE NOTES
FIRST PROVIDER CONTACT ASSESSMENT NOTE                                                                                                Department of Emergency Medicine                                                      First Provider Note  21  5:30 PM EDT  NAME: Noble Villalpando  : 1990  MRN: [id number]    Chief Complaint: Other (patient is in withdrawl from methadone, was given here, states she still does not feel well, states she is still nausated and does not feel well and cannot go home.)      History of Present Illness:   Noble Villalpando is a 31 y.o. female who presents to the ED for withdrawal symptoms. Was seen here earlier and given Suboxone states she still nauseated and does not want to go home. Focused Physical Exam:  VS:    ED Triage Vitals   BP Temp Temp src Pulse Resp SpO2 Height Weight   -- 21 1728 -- 21 1721 21 1728 21 1721 21 1728 21 1728    98 °F (36.7 °C)  66 16 99 % 5' 4\" (1.626 m) 230 lb (104.3 kg)        General: Alert and in no apparent distress. Medical History:  has a past medical history of Abnormal Pap smear of cervix, Anemia, Drug abuse ([address] Obesity, Overdose, and Preeclampsia complicating hypertension. Surgical History:  has no past surgical history on file. Social History:  reports that she has been smoking cigarettes. She has a 6.50 pack-year smoking history. She has never used smokeless tobacco. She reports previous drug use. Drug: Methamphetamines. She reports that she does not drink alcohol. Family History: family history is not on file. Allergies: Patient has no known allergies.      Initial Plan of Care:  Initiate Treatment-Testing, Proceed toTreatment Area When Bed Available for ED Attending/MLP to Continue Care    -------------------------------------------------END OF FIRST PROVIDER CONTACT ASSESSMENT NOTE--------------------------------------------------------  Electronically signed by Filiberto Fairbanks [facility] [place], APRN - CNP   DD: 9/6/21

## 2021-09-06 NOTE — ED NOTES
Patient states she is unable to provide urine sample at this time.      Humble Thomas LPN  54/05/69 8378

## 2021-09-06 NOTE — ED PROVIDER NOTES
[facility]  Department of Emergency Medicine   ED  Encounter Note  Admit Date/RoomTime: 2021  1:30 PM  ED Room: [place] [address]    NAME: Sofia Hutson  : 1990  MRN: [id number]     Chief Complaint:  Other (needs her methadone dose due to missing it b/c of the holiday.)    History of Present Illness      Kecia Butler is a 31 y.o. old female presents to the emergency department via by private vehicle requesting medication(s) as result of on methadone and missed her dose today as the clinic closed early due to holiday. She takes methadone 90 mg every morning and 90 mg every evening and treats with [place] services. She has been on methadone for 8 months. She is now experiencing withdrawal symptoms. She denies any chest pain shortness of breath fever chills. ROS   Pertinent positives and negatives are stated within HPI, all other systems reviewed and are negative. Past Medical History:  has a past medical history of Abnormal Pap smear of cervix, Anemia, Drug abuse ([address] Obesity, Overdose, and Preeclampsia complicating hypertension. Surgical History:  has no past surgical history on file. Social History:  reports that she has been smoking cigarettes. She has a 6.50 pack-year smoking history. She has never used smokeless tobacco. She reports previous drug use. Drug: Methamphetamines. She reports that she does not drink alcohol. Family History: family history is not on file. Allergies: Patient has no known allergies. Physical Exam   Oxygen Saturation Interpretation: Normal.        ED Triage Vitals [21 1328]   BP Temp Temp src Pulse Resp SpO2 Height Weight   (!) 168/112 98 °F (36.7 °C) -- 56 16 98 % 5' 4\" (1.626 m) 230 lb (104.3 kg)         Constitutional:  Alert, development consistent with age. HEENT:  NC/NT. Airway patent. Neck:  Normal ROM. Supple.   Respiratory:  Clear to auscultation and breath sounds equal.    CV: Gwyn Arnold, JIM - CNP  09/06/21 5736

## 2021-09-07 LAB
EKG ATRIAL RATE: 51 BPM
EKG P AXIS: 15 DEGREES
EKG P-R INTERVAL: 234 MS
EKG Q-T INTERVAL: 474 MS
EKG QRS DURATION: 84 MS
EKG QTC CALCULATION (BAZETT): 436 MS
EKG R AXIS: 78 DEGREES
EKG T AXIS: 43 DEGREES
EKG VENTRICULAR RATE: 51 BPM

## 2021-09-07 PROCEDURE — 93010 ELECTROCARDIOGRAM REPORT: CPT | Performed by: INTERNAL MEDICINE

## 2021-09-07 ASSESSMENT — ENCOUNTER SYMPTOMS
VOMITING: 0
ABDOMINAL PAIN: 0
SHORTNESS OF BREATH: 0
EYE PAIN: 0
COUGH: 0
DIARRHEA: 0
BACK PAIN: 0
SORE THROAT: 0
NAUSEA: 1

## 2021-09-07 NOTE — ED PROVIDER NOTES
ATTENDING PROVIDER ATTESTATION:     Malia Vail presented to the emergency department for evaluation of Other (patient is in withdrawl from methadone, was given here, states she still does not feel well, states she is still nausated and does not feel well and cannot go home.)   and was initially evaluated by the Medical Resident. See Original ED Note for H&P and ED course above. I have reviewed and discussed the case, including pertinent history (medical, surgical, family and social) and exam findings with the Medical Resident assigned to Malia Vail. I have personally performed and/or participated in the history, exam, medical decision making, and procedures and agree with all pertinent clinical information and any additional changes or corrections are noted below in my assessment and plan. I have discussed this patient in detail with the resident, and provided the instruction and education,     I have reviewed my findings and recommendations with the assigned Medical Resident, Malia Sender and members of family present at the time of disposition. I have performed a history and physical examination of this patient and directly supervised the resident caring for this patient    HPI  This is a 24-year-old female who presents to the emergency department today stating she is feeling unwell. The patient was seen earlier today as she did not receive her methadone dose. She is on 90 twice daily. She presents to [facility] to get this medication. Today is Labor Day however so she has been unable to obtain her dose. She originally presented this morning was given Suboxone. After leaving the hospital she began to feel unwell. She is feeling her heart racing and describes as palpitations. 1 question about chest pain she states she feels the beating sensation that is all. She also feels somewhat short of breath.   No history of blood clot, recent travel, leg swelling or hormone use. She has had nausea and vomiting. 2 episodes of emesis that is described as nonbloody and nonbilious. The patient is also experiencing diarrhea which she describes as nonbloody and nonmelanotic. She denies any fever cough. She is starting to get a headache. It is frontal and throbbing. No trauma, numbness, tingling or weakness. No vision change. ROS  A complete review of systems was performed and pertinent positives and negatives are stated within HPI, all other systems reviewed and are negative.    --------------------------------------------- PAST HISTORY ---------------------------------------------  Past Medical History:  has a past medical history of Abnormal Pap smear of cervix, Anemia, Drug abuse ([facility] [address] Obesity, Overdose, and Preeclampsia complicating hypertension. Past Surgical History:  has no past surgical history on file. Social History:  reports that she has been smoking cigarettes. She has a 6.50 pack-year smoking history. She has never used smokeless tobacco. She reports previous drug use. Drug: Methamphetamines. She reports that she does not drink alcohol. Family History: family history is not on file. Unless otherwise noted, family history is non contributory    The patients home medications have been reviewed. Allergies: Patient has no known allergies. Physical Exam  General: The patient is resting comfortably but tearful   head: Normocephalic and atraumatic. Eyes: Sclera non-icteric and no conjunctival injection. The patient has clear tearing from her eyes. ENT: Nasal and oral membranes moist  Neck: Trachea midline. No JVD  Respiratory: Lungs clear to auscultation bilaterally. Patient speaking in full sentences. Cardiovascular: Regular rate. No pedal edema. Gastrointestinal:  Abdomen is soft and nondistended. Bowel sounds present. There is no tenderness. There is no guarding or rebound. Musculoskeletal: No deformity  Skin: Skin warm and dry.   No rashes. Neurologic: No gross motor deficits. No aphasia. Pupils are equal and reactive to light. Extraocular eye movements intact. Sensation intact bilaterally. Symmetric facies. Tongue protrudes midline. 5 out of 5 symmetric strength of the upper and lower extremities. Negative finger-to-nose testing. Alert and oriented. COWS 7  Psychiatric: Normal affect. She appears mildly anxious    MDM  This is a 31 y.o. female presenting to the ED for not feeling well. On initial presentation, the patient's vital signs are interpreted as hypertensive, afebrile and saturating well on room air. Based on history and physical exam, we have a broad differential.  The patient has evidence of opiate withdrawal at this time. Review of EMR shows she was seen earlier today and given a dose of Suboxone. She is endorsing a headache and has a nonfocal neurological exam.  Withdrawal may be contributing to her headache. We also considered tension and migrainous type. No history of trauma. At this time she will be hydrated with a liter bolus and given Phenergan as well as clonidine. We will obtain basic laboratory studies. She is endorsing chest palpitations so we will obtain EKG and chest x-ray as well as a troponin to assess for ACS or arrhythmia. The patient can be PERCed out and is low risk Wells. I not suspect PE.    A 12-lead EKG was performed and interpreted by myself. The rate is 51 with sinus bradycardia and normal axis. Sinus arrhythmia noted. The CT interval is 234, QRS interval is 84, and QTC interval is 436. There is T wave inversion in V1 and V2 as well as V3. No acute ST elevation or depression. Artifact noted. The sinus bradycardia with sinus arrhythmia first-degree AV block and nonspecific abnormality. The patient will be given Reglan, Benadryl and Toradol for her headache. The patient's electrolytes are within normal limits. Alkaline phosphatase and ALT as well as AST mildly elevated. Bilirubin within normal limits. Alcohol and tricyclic negative. Urine drug screen shows methadone. Tylenol and salicylate negative. No leukocytosis or anemia. Chest x-ray shows no acute process. This is interpreted by radiology. Troponin is less than 6 and negative. As her symptoms have been present this would not be repeated. On reevaluation, the patient endorses improvement of her headache and symptoms. We did offer her a dose of Suboxone which she is declined. She will be able to get her methadone in the morning. She is not currently experiencing chest pain or palpitations. At this time she is stable for discharge. We have stressed the importance of appropriate follow-up with a primary care physician. We can provide her with prescriptions for Tylenol and Zofran. Patient verbalies understanding and is agreeable to the plan. I directly supervised any procedures performed by the resident and was present for the procedure including all critical portions of the procedure    The cardiac monitor revealed sinus rhythm with a heart rate in the 80s as interpreted by me. The cardiac monitor was ordered secondary to the patient's palpiltations and to monitor the patient for dysrhythmia. CPT [id number]    IDr. Loretta, am the primary provider of record    Impression  1. Opioid withdrawal (HCC)    2. Palpitations    3.  Chest pain, unspecified type            Loretta Brumfield MD  09/06/21 0014

## 2021-09-07 NOTE — ED PROVIDER NOTES
HPI   Patient is a 31 y.o. female with a past medical history of methadone use, presenting to the Emergency Department for chest pain, anxiety, cooncern for withdrawl. History obtained by patient. Symptoms are moderate in severity and persistant since onset. They are improved by nothing and worsened by suboxone. Patient presents for concern for withdrawal. Currently enrolled in methadone clinic. Gets scripts daily and on frdiay gets scripts for the weekend. Today she was due but clinic closed for holiday. Feels like her heart is racing. Chest pain ,nauease. Chat pain is described as palpitations and not real pain. Chest of chest, does not radiate, has been there all day. States she came earlier and was given dose of suboxone instead of methadone which made sympomts worse. She did not feel improved with the suboxone and can;t get relaxed. Never had suboxone before. No hx dvt or pe, no leg pain, swelling, no dvt/pe risk factors. Review of Systems   Constitutional: Positive for diaphoresis. Negative for chills and fever. HENT: Negative for congestion and sore throat. Eyes: Negative for pain and visual disturbance. Respiratory: Negative for cough and shortness of breath. Cardiovascular: Positive for palpitations. Negative for chest pain. Gastrointestinal: Positive for nausea. Negative for abdominal pain, diarrhea and vomiting. Genitourinary: Negative for dysuria and frequency. Musculoskeletal: Negative for arthralgias and back pain. Skin: Negative for rash and wound. Neurological: Negative for seizures, weakness and headaches. All other systems reviewed and are negative. Physical Exam  Vitals and nursing note reviewed. Constitutional:       General: She is not in acute distress. Appearance: She is well-developed. She is diaphoretic. She is not ill-appearing. HENT:      Head: Normocephalic and atraumatic. Eyes:      Extraocular Movements: Extraocular movements intact. Comments: Pupils 5 mm bilaterally   Cardiovascular:      Rate and Rhythm: Normal rate and regular rhythm. Pulmonary:      Effort: Pulmonary effort is normal. No respiratory distress. Breath sounds: Normal breath sounds. No wheezing or rales. Abdominal:      Palpations: Abdomen is soft. Tenderness: There is no abdominal tenderness. There is no guarding or rebound. Musculoskeletal:         General: Normal range of motion. Cervical back: Normal range of motion and neck supple. Skin:     General: Skin is warm. Capillary Refill: Capillary refill takes less than 2 seconds. Neurological:      General: No focal deficit present. Mental Status: She is alert and oriented to person, place, and time. Cranial Nerves: No cranial nerve deficit. Sensory: No sensory deficit. Motor: No weakness. Coordination: Coordination normal.          Procedures     MDM   Patient presented to the Emergency Department for withdrawal and chest pain. They are clinically stable, vital signs stable, non toxic appearing. Initial COW score of 7. Work-up initiated. Patient no chest pain. She is Wells low risk and PERC negative, no concern for PE.  EKG reassuring. Troponin less than 6. No concern for ACS. Symptoms most likely secondary to withdrawal.  Patient offered another dose of Suboxone here in the department. She is refusing it not wanting at this time. Patient has appointment tomorrow for her methadone. Instructed to go there. Strict return precautions were discussed including but not limited too worsening symptoms, worsening chest pain, shortness of breath, new or worsening symtpoms. They verbalized understanding and were agreeable with the plan. All questions were answered and patient was discharged.             --------------------------------------------- PAST HISTORY ---------------------------------------------  Past Medical History:  has a past medical history of Abnormal Pap smear of cervix, Anemia, Drug abuse ([address] Obesity, Overdose, and Preeclampsia complicating hypertension. Past Surgical History:  has no past surgical history on file. Social History:  reports that she has been smoking cigarettes. She has a 6.50 pack-year smoking history. She has never used smokeless tobacco. She reports previous drug use. Drug: Methamphetamines. She reports that she does not drink alcohol. Family History: family history is not on file. The patients home medications have been reviewed. Allergies: Patient has no known allergies.     -------------------------------------------------- RESULTS -------------------------------------------------  Labs:  Results for orders placed or performed during the hospital encounter of 09/06/21   CBC   Result Value Ref Range    WBC 8.9 4.5 - 11.5 E9/L    RBC 3.78 3.50 - 5.50 E12/L    Hemoglobin 12.0 11.5 - 15.5 g/dL    Hematocrit 35.6 34.0 - 48.0 %    MCV 94.2 80.0 - 99.9 fL    MCH 31.7 26.0 - 35.0 pg    MCHC 33.7 32.0 - 34.5 %    RDW 12.8 11.5 - 15.0 fL    Platelets 340 159 - 378 E9/L    MPV 9.0 7.0 - 12.0 fL   Comprehensive Metabolic Panel   Result Value Ref Range    Sodium 138 132 - 146 mmol/L    Potassium 4.0 3.5 - 5.0 mmol/L    Chloride 101 98 - 107 mmol/L    CO2 27 22 - 29 mmol/L    Anion Gap 10 7 - 16 mmol/L    Glucose 104 (H) 74 - 99 mg/dL    BUN 6 6 - 20 mg/dL    CREATININE 0.7 0.5 - 1.0 mg/dL    GFR Non-African American >60 >=60 mL/min/1.73    GFR African American >60     Calcium 9.5 8.6 - 10.2 mg/dL    Total Protein 8.2 6.4 - 8.3 g/dL    Albumin 3.8 3.5 - 5.2 g/dL    Total Bilirubin <0.2 0.0 - 1.2 mg/dL    Alkaline Phosphatase 110 (H) 35 - 104 U/L    ALT 37 (H) 0 - 32 U/L    AST 49 (H) 0 - 31 U/L   URINE DRUG SCREEN   Result Value Ref Range    Amphetamine Screen, Urine NOT DETECTED Negative <1000 ng/mL    Barbiturate Screen, Ur NOT DETECTED Negative < 200 ng/mL    Benzodiazepine Screen, Urine NOT DETECTED Negative < 200 ng/mL    Cannabinoid Scrn, Ur NOT DETECTED Negative < 50ng/mL    Cocaine Metabolite Screen, Urine NOT DETECTED Negative < 300 ng/mL    Opiate Scrn, Ur NOT DETECTED Negative < 300ng/mL    PCP Screen, Urine NOT DETECTED Negative < 25 ng/mL    Methadone Screen, Urine POSITIVE (A) Negative <300 ng/mL    Oxycodone Urine NOT DETECTED Negative <100 ng/mL    FENTANYL SCREEN, URINE NOT DETECTED Negative <1 ng/mL    Drug Screen Comment: see below    Serum Drug Screen   Result Value Ref Range    Ethanol Lvl <10 mg/dL    Acetaminophen Level <5.0 (L) 10.0 - 77.0 mcg/mL    Salicylate, Serum <6.1 0.0 - 30.0 mg/dL    TCA Scrn NEGATIVE Cutoff:300 ng/mL   Troponin   Result Value Ref Range    Troponin, High Sensitivity <6 0 - 9 ng/L   EKG 12 Lead   Result Value Ref Range    Ventricular Rate 51 BPM    Atrial Rate 51 BPM    P-R Interval 234 ms    QRS Duration 84 ms    Q-T Interval 474 ms    QTc Calculation (Bazett) 436 ms    P Axis 15 degrees    R Axis 78 degrees    T Axis 43 degrees       Radiology:  XR CHEST PORTABLE   Final Result   No acute process. ------------------------- NURSING NOTES AND VITALS REVIEWED ---------------------------  Date / Time Roomed:  9/6/2021  6:49 PM  ED Bed Assignment:  PETROS/PETROS    The nursing notes within the ED encounter and vital signs as below have been reviewed.    /75   Pulse 80   Temp 98 °F (36.7 °C)   Resp 16   Ht 5' 4\" (1.626 m)   Wt 230 lb (104.3 kg)   SpO2 99%   BMI 39.48 kg/m²   Oxygen Saturation Interpretation: Normal      ------------------------------------------ PROGRESS NOTES ------------------------------------------  ED COURSE MEDICATIONS:                Medications   promethazine (PHENERGAN) injection 25 mg (25 mg IntraMUSCular Given 9/6/21 1925)   0.9 % sodium chloride bolus (0 mLs IntraVENous Stopped 9/6/21 2150)   metoclopramide (REGLAN) injection 10 mg (10 mg IntraVENous Given 9/6/21 2034)   diphenhydrAMINE (BENADRYL) injection 25 mg (25 mg IntraVENous Given 9/6/21 2034) ketorolac (TORADOL) injection 15 mg (15 mg IntraVENous Given 9/6/21 2033)       I have spoken with the patient and discussed todays results, in addition to providing specific details for the plan of care and counseling regarding the diagnosis and prognosis. Their questions are answered at this time and they are agreeable with the plan. I discussed at length with them reasons for immediate return here for re evaluation. They will followup with primary care by calling their office tomorrow. --------------------------------- ADDITIONAL PROVIDER NOTES ---------------------------------  At this time the patient is without objective evidence of an acute process requiring hospitalization or inpatient management. They have remained hemodynamically stable throughout their entire ED visit and are stable for discharge with outpatient follow-up. The plan has been discussed in detail and they are aware of the specific conditions for emergent return, as well as the importance of follow-up. Discharge Medication List as of 9/6/2021  9:20 PM      START taking these medications    Details   ondansetron (ZOFRAN ODT) 4 MG disintegrating tablet Take 1 tablet by mouth every 8 hours as needed for Nausea or Vomiting, Disp-10 tablet, R-0Print      acetaminophen (TYLENOL) 500 MG tablet Take 1 tablet by mouth 4 times daily as needed for Pain, Disp-120 tablet, R-0Print             Diagnosis:  1. Opioid withdrawal (HCC)    2. Palpitations    3. Chest pain, unspecified type        Disposition:  Patient's disposition: Discharge to home  Patient's condition is stable.         Caden Soni DO  Resident  09/08/21 7941

## 2021-12-17 ENCOUNTER — APPOINTMENT (OUTPATIENT)
Dept: CT IMAGING | Age: 31
End: 2021-12-17
Payer: MEDICAID

## 2021-12-17 ENCOUNTER — HOSPITAL ENCOUNTER (EMERGENCY)
Age: 31
Discharge: HOME | End: 2021-12-17
Payer: MEDICAID

## 2021-12-17 VITALS
HEIGHT: 64 IN | TEMPERATURE: 97.7 F | DIASTOLIC BLOOD PRESSURE: 95 MMHG | SYSTOLIC BLOOD PRESSURE: 126 MMHG | WEIGHT: 286 LBS | RESPIRATION RATE: 16 BRPM | OXYGEN SATURATION: 99 % | BODY MASS INDEX: 48.83 KG/M2 | HEART RATE: 63 BPM

## 2021-12-17 DIAGNOSIS — R11.2 NAUSEA VOMITING AND DIARRHEA: ICD-10-CM

## 2021-12-17 DIAGNOSIS — R07.89 ATYPICAL CHEST PAIN: ICD-10-CM

## 2021-12-17 DIAGNOSIS — R10.84 GENERALIZED ABDOMINAL PAIN: ICD-10-CM

## 2021-12-17 DIAGNOSIS — R19.7 NAUSEA VOMITING AND DIARRHEA: ICD-10-CM

## 2021-12-17 DIAGNOSIS — K59.03 DRUG INDUCED CONSTIPATION: Primary | ICD-10-CM

## 2021-12-17 LAB
ALBUMIN SERPL-MCNC: 4.6 G/DL (ref 3.5–5.2)
ALP BLD-CCNC: 119 U/L (ref 35–104)
ALT SERPL-CCNC: 27 U/L (ref 0–32)
ANION GAP SERPL CALCULATED.3IONS-SCNC: 11 MMOL/L (ref 7–16)
AST SERPL-CCNC: 28 U/L (ref 0–31)
BACTERIA: ABNORMAL /HPF
BASOPHILS ABSOLUTE: 0.03 E9/L (ref 0–0.2)
BASOPHILS RELATIVE PERCENT: 0.3 % (ref 0–2)
BILIRUB SERPL-MCNC: 0.3 MG/DL (ref 0–1.2)
BILIRUBIN URINE: NEGATIVE
BLOOD, URINE: NEGATIVE
BUN BLDV-MCNC: 7 MG/DL (ref 6–20)
CALCIUM SERPL-MCNC: 9.8 MG/DL (ref 8.6–10.2)
CHLORIDE BLD-SCNC: 102 MMOL/L (ref 98–107)
CLARITY: CLEAR
CO2: 27 MMOL/L (ref 22–29)
COLOR: YELLOW
CREAT SERPL-MCNC: 0.7 MG/DL (ref 0.5–1)
EOSINOPHILS ABSOLUTE: 0.08 E9/L (ref 0.05–0.5)
EOSINOPHILS RELATIVE PERCENT: 0.8 % (ref 0–6)
GFR AFRICAN AMERICAN: >60
GFR NON-AFRICAN AMERICAN: >60 ML/MIN/1.73
GLUCOSE BLD-MCNC: 86 MG/DL (ref 74–99)
GLUCOSE URINE: NEGATIVE MG/DL
HCG, URINE, POC: NEGATIVE
HCT VFR BLD CALC: 38 % (ref 34–48)
HEMOGLOBIN: 12.6 G/DL (ref 11.5–15.5)
IMMATURE GRANULOCYTES #: 0.04 E9/L
IMMATURE GRANULOCYTES %: 0.4 % (ref 0–5)
KETONES, URINE: NEGATIVE MG/DL
LACTIC ACID: 1 MMOL/L (ref 0.5–2.2)
LEUKOCYTE ESTERASE, URINE: NEGATIVE
LIPASE: 13 U/L (ref 13–60)
LYMPHOCYTES ABSOLUTE: 1.64 E9/L (ref 1.5–4)
LYMPHOCYTES RELATIVE PERCENT: 15.8 % (ref 20–42)
Lab: NORMAL
MCH RBC QN AUTO: 31 PG (ref 26–35)
MCHC RBC AUTO-ENTMCNC: 33.2 % (ref 32–34.5)
MCV RBC AUTO: 93.4 FL (ref 80–99.9)
MONOCYTES ABSOLUTE: 0.62 E9/L (ref 0.1–0.95)
MONOCYTES RELATIVE PERCENT: 6 % (ref 2–12)
NEGATIVE QC PASS/FAIL: NORMAL
NEUTROPHILS ABSOLUTE: 7.99 E9/L (ref 1.8–7.3)
NEUTROPHILS RELATIVE PERCENT: 76.7 % (ref 43–80)
NITRITE, URINE: NEGATIVE
PDW BLD-RTO: 12.7 FL (ref 11.5–15)
PH UA: 8 (ref 5–9)
PLATELET # BLD: 312 E9/L (ref 130–450)
PMV BLD AUTO: 9.1 FL (ref 7–12)
POSITIVE QC PASS/FAIL: NORMAL
POTASSIUM SERPL-SCNC: 3.8 MMOL/L (ref 3.5–5)
PROTEIN UA: NEGATIVE MG/DL
RBC # BLD: 4.07 E12/L (ref 3.5–5.5)
RBC UA: ABNORMAL /HPF (ref 0–2)
SARS-COV-2, NAAT: NOT DETECTED
SODIUM BLD-SCNC: 140 MMOL/L (ref 132–146)
SPECIFIC GRAVITY UA: 1.02 (ref 1–1.03)
TOTAL PROTEIN: 8.8 G/DL (ref 6.4–8.3)
TROPONIN, HIGH SENSITIVITY: <6 NG/L (ref 0–9)
TROPONIN, HIGH SENSITIVITY: <6 NG/L (ref 0–9)
UROBILINOGEN, URINE: 2 E.U./DL
WBC # BLD: 10.4 E9/L (ref 4.5–11.5)
WBC UA: ABNORMAL /HPF (ref 0–5)

## 2021-12-17 PROCEDURE — 83605 ASSAY OF LACTIC ACID: CPT

## 2021-12-17 PROCEDURE — 6360000002 HC RX W HCPCS: Performed by: PHYSICIAN ASSISTANT

## 2021-12-17 PROCEDURE — 74176 CT ABD & PELVIS W/O CONTRAST: CPT

## 2021-12-17 PROCEDURE — 85025 COMPLETE CBC W/AUTO DIFF WBC: CPT

## 2021-12-17 PROCEDURE — 83690 ASSAY OF LIPASE: CPT

## 2021-12-17 PROCEDURE — 96372 THER/PROPH/DIAG INJ SC/IM: CPT

## 2021-12-17 PROCEDURE — 99284 EMERGENCY DEPT VISIT MOD MDM: CPT

## 2021-12-17 PROCEDURE — 80053 COMPREHEN METABOLIC PANEL: CPT

## 2021-12-17 PROCEDURE — 81001 URINALYSIS AUTO W/SCOPE: CPT

## 2021-12-17 PROCEDURE — 93005 ELECTROCARDIOGRAM TRACING: CPT | Performed by: PHYSICIAN ASSISTANT

## 2021-12-17 PROCEDURE — 84484 ASSAY OF TROPONIN QUANT: CPT

## 2021-12-17 PROCEDURE — 87635 SARS-COV-2 COVID-19 AMP PRB: CPT

## 2021-12-17 PROCEDURE — 6370000000 HC RX 637 (ALT 250 FOR IP): Performed by: PHYSICIAN ASSISTANT

## 2021-12-17 RX ORDER — KETOROLAC TROMETHAMINE 30 MG/ML
30 INJECTION, SOLUTION INTRAMUSCULAR; INTRAVENOUS ONCE
Status: COMPLETED | OUTPATIENT
Start: 2021-12-17 | End: 2021-12-17

## 2021-12-17 RX ORDER — KETOROLAC TROMETHAMINE 10 MG/1
10 TABLET, FILM COATED ORAL EVERY 6 HOURS PRN
Qty: 12 TABLET | Refills: 0 | OUTPATIENT
Start: 2021-12-17 | End: 2022-01-08

## 2021-12-17 RX ORDER — ONDANSETRON 4 MG/1
4 TABLET, ORALLY DISINTEGRATING ORAL EVERY 8 HOURS PRN
Qty: 10 TABLET | Refills: 0 | Status: SHIPPED | OUTPATIENT
Start: 2021-12-17 | End: 2022-05-02

## 2021-12-17 RX ORDER — DICYCLOMINE HYDROCHLORIDE 10 MG/1
10 CAPSULE ORAL EVERY 6 HOURS PRN
Qty: 28 CAPSULE | Refills: 0 | Status: SHIPPED | OUTPATIENT
Start: 2021-12-17 | End: 2022-05-02

## 2021-12-17 RX ORDER — SUCRALFATE 1 G/1
1 TABLET ORAL ONCE
Status: COMPLETED | OUTPATIENT
Start: 2021-12-17 | End: 2021-12-17

## 2021-12-17 RX ORDER — DOCUSATE SODIUM 100 MG/1
100 CAPSULE, LIQUID FILLED ORAL 2 TIMES DAILY PRN
Qty: 28 CAPSULE | Refills: 0 | Status: SHIPPED | OUTPATIENT
Start: 2021-12-17 | End: 2021-12-31

## 2021-12-17 RX ORDER — FAMOTIDINE 20 MG/1
20 TABLET, FILM COATED ORAL ONCE
Status: COMPLETED | OUTPATIENT
Start: 2021-12-17 | End: 2021-12-17

## 2021-12-17 RX ORDER — ONDANSETRON 4 MG/1
4 TABLET, ORALLY DISINTEGRATING ORAL EVERY 12 HOURS PRN
Qty: 10 TABLET | Refills: 0 | Status: SHIPPED | OUTPATIENT
Start: 2021-12-17 | End: 2021-12-22

## 2021-12-17 RX ORDER — MAGNESIUM CARB/ALUMINUM HYDROX 105-160MG
150 TABLET,CHEWABLE ORAL 2 TIMES DAILY PRN
Qty: 150 ML | Refills: 0 | Status: SHIPPED | OUTPATIENT
Start: 2021-12-17 | End: 2021-12-19

## 2021-12-17 RX ORDER — ONDANSETRON 4 MG/1
4 TABLET, ORALLY DISINTEGRATING ORAL ONCE
Status: COMPLETED | OUTPATIENT
Start: 2021-12-17 | End: 2021-12-17

## 2021-12-17 RX ADMIN — KETOROLAC TROMETHAMINE 30 MG: 30 INJECTION, SOLUTION INTRAMUSCULAR at 19:50

## 2021-12-17 RX ADMIN — ONDANSETRON 4 MG: 4 TABLET, ORALLY DISINTEGRATING ORAL at 19:49

## 2021-12-17 RX ADMIN — FAMOTIDINE 20 MG: 20 TABLET ORAL at 21:59

## 2021-12-17 RX ADMIN — SUCRALFATE 1 G: 1 TABLET ORAL at 21:59

## 2021-12-17 ASSESSMENT — PAIN SCALES - GENERAL: PAINLEVEL_OUTOF10: 10

## 2021-12-17 NOTE — Clinical Note
Kody Pozo was seen and treated in our emergency department on 12/17/2021. She may return to work on 12/21/2021. If you have any questions or concerns, please don't hesitate to call.       Jie Mason PA-C

## 2021-12-18 NOTE — ED PROVIDER NOTES
[address]  Department of Emergency Medicine   ED  Encounter Note  Admit Date/RoomTime: 2021  7:48 PM  ED Room: [place] [address]    NAME: Jamie Bates  : 1990  MRN: [id number]     Chief Complaint:  Abdominal Pain (abd pain, N/V/D onset today. )    History of Present Illness       Jamie Bates is a 36 y.o. old female who presents to the emergency department by private vehicle, for sudden onset aching, cramping pain in the entire abdomen without radiation which began 1 day(s) prior to arrival. Patient states \"I just woke up this morning and having very bad pain in my belly such as cramping with associated nausea, vomiting and diarrhea. \"   There has been no similar episodes in the past.  Since onset the symptoms have been stable. The pain is associated with abdominal pain, nausea, vomiting, anorexia and diarrhea. The pain is aggravated by nothing in particular and relieved by nothing. Patient states that she tried ibuprofen without relief. Patient states she was around her sister who was Covid positive. There has been NO back pain, chest pain, shortness of breath, fever, chills, sweating, constipation, dark/black stools, blood in stool, cloudy urine, urinary frequency, dysuria, hematuria, urinary urgency or urinary incontinence. Last Menstrual  Cycle is regular. Patient states her last menstrual cycle was 7 months ago when she had her baby. Patient states she has no concern for pregnancy. .  ROS   Pertinent positives and negatives are stated within HPI, all other systems reviewed and are negative. Past Medical History:  has a past medical history of Abnormal Pap smear of cervix, Anemia, Drug abuse ([address] Obesity, Overdose, and Preeclampsia complicating hypertension. Surgical History has no past surgical history on file. Social History:  reports that she has been smoking cigarettes. She has a 6.50 pack-year smoking history.  She has never used smokeless tobacco. She reports previous drug use. Drug: Methamphetamines (Crystal Meth). She reports that she does not drink alcohol. Family History: family history is not on file. Allergies: Patient has no known allergies. Physical Exam   Oxygen Saturation Interpretation: Normal on room air analysis. ED Triage Vitals   BP Temp Temp Source Pulse Resp SpO2 Height Weight   12/17/21 1930 12/17/21 1930 12/17/21 1930 12/17/21 1855 12/17/21 1930 12/17/21 1855 12/17/21 1930 12/17/21 1930   (!) 175/106 98.4 °F (36.9 °C) Oral 75 17 97 % 5' 4\" (1.626 m) 286 lb (129.7 kg)        Physical Exam  General Appearance/Constitutional:  Alert, development consistent with age. HEENT:  NC/NT. PERRLA. Airway patent. Neck:  Supple. No lymphadenopathy. Respiratory:  No retractions. Lungs Clear to auscultation and breath sounds equal.  CV:  Regular rate and rhythm. GI:  normal appearing, non-distended with no visible hernias. Bowel sounds: normal bowel sounds. Tenderness: There is mild tenderness present - located diffusely in the entire abdomen. .        Liver: non-tender. Spleen:  non-tender. Back: CVA Tenderness: No CVA tenderness. : /Pelvic examination deferred / declined. Integument:  Normal turgor. Warm, dry, without visible rash, unless noted elsewhere. Lymphatics: No edema, cap.refill <3sec. Neurological:  Orientation age-appropriate. Motor functions intact.     Lab / Imaging Results   (All laboratory and radiology results have been personally reviewed by myself)  Labs:  Results for orders placed or performed during the hospital encounter of 12/17/21   COVID-19, Rapid    Specimen: Nasopharyngeal Swab   Result Value Ref Range    SARS-CoV-2, NAAT Not Detected Not Detected   CBC auto differential   Result Value Ref Range    WBC 10.4 4.5 - 11.5 E9/L    RBC 4.07 3.50 - 5.50 E12/L    Hemoglobin 12.6 11.5 - 15.5 g/dL    Hematocrit 38.0 34.0 - 48.0 %    MCV 93.4 80.0 - 99.9 fL    MCH 31.0 26.0 - 35.0 pg    MCHC 33.2 32.0 - 34.5 %    RDW 12.7 11.5 - 15.0 fL    Platelets 413 626 - 942 E9/L    MPV 9.1 7.0 - 12.0 fL    Neutrophils % 76.7 43.0 - 80.0 %    Immature Granulocytes % 0.4 0.0 - 5.0 %    Lymphocytes % 15.8 (L) 20.0 - 42.0 %    Monocytes % 6.0 2.0 - 12.0 %    Eosinophils % 0.8 0.0 - 6.0 %    Basophils % 0.3 0.0 - 2.0 %    Neutrophils Absolute 7.99 (H) 1.80 - 7.30 E9/L    Immature Granulocytes # 0.04 E9/L    Lymphocytes Absolute 1.64 1.50 - 4.00 E9/L    Monocytes Absolute 0.62 0.10 - 0.95 E9/L    Eosinophils Absolute 0.08 0.05 - 0.50 E9/L    Basophils Absolute 0.03 0.00 - 0.20 E9/L   Comprehensive Metabolic Panel   Result Value Ref Range    Sodium 140 132 - 146 mmol/L    Potassium 3.8 3.5 - 5.0 mmol/L    Chloride 102 98 - 107 mmol/L    CO2 27 22 - 29 mmol/L    Anion Gap 11 7 - 16 mmol/L    Glucose 86 74 - 99 mg/dL    BUN 7 6 - 20 mg/dL    CREATININE 0.7 0.5 - 1.0 mg/dL    GFR Non-African American >60 >=60 mL/min/1.73    GFR African American >60     Calcium 9.8 8.6 - 10.2 mg/dL    Total Protein 8.8 (H) 6.4 - 8.3 g/dL    Albumin 4.6 3.5 - 5.2 g/dL    Total Bilirubin 0.3 0.0 - 1.2 mg/dL    Alkaline Phosphatase 119 (H) 35 - 104 U/L    ALT 27 0 - 32 U/L    AST 28 0 - 31 U/L   Lactic Acid, Plasma   Result Value Ref Range    Lactic Acid 1.0 0.5 - 2.2 mmol/L   Lipase   Result Value Ref Range    Lipase 13 13 - 60 U/L   Urinalysis with Microscopic   Result Value Ref Range    Color, UA Yellow Straw/Yellow    Clarity, UA Clear Clear    Glucose, Ur Negative Negative mg/dL    Bilirubin Urine Negative Negative    Ketones, Urine Negative Negative mg/dL    Specific Gravity, UA 1.020 1.005 - 1.030    Blood, Urine Negative Negative    pH, UA 8.0 5.0 - 9.0    Protein, UA Negative Negative mg/dL    Urobilinogen, Urine 2.0 (A) <2.0 E.U./dL    Nitrite, Urine Negative Negative    Leukocyte Esterase, Urine Negative Negative    WBC, UA 0-1 0 - 5 /HPF    RBC, UA 0-1 0 - 2 /HPF    Bacteria, UA FEW (A) None Seen /HPF   Troponin   Result Value Ref Range    Troponin, High Sensitivity <6 0 - 9 ng/L   Troponin   Result Value Ref Range    Troponin, High Sensitivity <6 0 - 9 ng/L   POC Pregnancy Urine Qual   Result Value Ref Range    HCG, Urine, POC Negative Negative    Lot Number [id number]     Positive QC Pass/Fail Pass     Negative QC Pass/Fail Pass      Imaging: All Radiology results interpreted by Radiologist unless otherwise noted. CT ABDOMEN PELVIS WO CONTRAST Additional Contrast? None   Final Result   Exam is degraded by patient motion related artifact. Moderate stool burden. No other obvious acute abnormality. Short-term follow-up if symptoms persist.      RECOMMENDATIONS:   Unavailable           ED Course / Medical Decision Making     Medications   ondansetron (ZOFRAN-ODT) disintegrating tablet 4 mg (4 mg Oral Given 12/17/21 1949)   ketorolac (TORADOL) injection 30 mg (30 mg IntraMUSCular Given 12/17/21 1950)   sucralfate (CARAFATE) tablet 1 g (1 g Oral Given 12/17/21 2159)   famotidine (PEPCID) tablet 20 mg (20 mg Oral Given 12/17/21 2159)        Re-Evaluations:  12/17/21      Time: 2134. Patient was reassessed and is still having a little bit of \"burning in her chest. Troponin repeated as well as Carafate and Pepcid given. I educated the patient that I still think she may have Covid and it might have been too early to test. Patient had a CT scan of the abdomen and pelvis which revealed a large amount of constipation. Patient is on methadone therefore she will be sent some medications to her pharmacy. Patient was told to remain on quarantine regardless and to be retested in 2 to 3 days. She was told alternate Tylenol and ibuprofen every 6-8 hours with food. Consultations:             None    Procedures:   none    MDM: Patient is a 33-year-old female that is presenting to the emergency department for generalized body aches atypical chest pain, chills, I want a crawl out of my skin. \" Patient states that she may have had exposure to Covid now. Patient states that her sister had it. Patient had a full work-up today including a delta troponin which was found to be negative. Patient had a code rapid Covid which was found to be negative. Patient's vitals were taken multiple times. Patient was medicated with Carafate, Pepcid, Toradol and Zofran. Patient symptoms did improve. Patient had a CT scan of the abdomen and pelvis which revealed drug-induced constipation. Patient is on methadone. Patient was explained this is most likely due to the medication she is on. Patient was advised that she should remain on quarantine and get tested again in 2 to 3 days. Patient was advised she can come back here or another urgent care or [facility] were testing is offered. Patient was told for aches and pains alternate Tylenol and ibuprofen every 6-8 hours with food and drink plenty of fluids, Gatorade or Pedialyte to help replenish her electrolytes. Patient was advised of worsening signs symptoms and when to return back. Patient currently has no shortness of breath, fevers, urination symptoms at this time. She has remained vitally stable is nontachycardic and no evidence of hypoxia and is stable for outpatient follow-up. Patient was explicitly instructed on specific signs and symptoms on which to return to the emergency room for. Patient was instructed to return to the ER for any new or worsening symptoms. Additional discharge instructions were given verbally. All questions were answered. Patient is comfortable and agreeable with discharge plan. Patient in no acute distress and non-toxic in appearance. Plan of Care/Counseling:  Kingsley Eduardo PA-C reviewed today's visit with the patient in addition to providing specific details for the plan of care and counseling regarding the diagnosis and prognosis. Questions are answered at this time and are agreeable with the plan. Assessment      1.  Drug induced constipation    2. Atypical chest pain    3. Generalized abdominal pain    4. Nausea vomiting and diarrhea      This patient's ED course included: a personal history and physicial examination  This patient has remained hemodynamically stable during their ED course. Plan   Discharged home  Patient condition is stable. New Medications     New Prescriptions    DICYCLOMINE (BENTYL) 10 MG CAPSULE    Take 1 capsule by mouth every 6 hours as needed (Abdominal pain)    DOCUSATE SODIUM (COLACE) 100 MG CAPSULE    Take 1 capsule by mouth 2 times daily as needed for Constipation    KETOROLAC (TORADOL) 10 MG TABLET    Take 1 tablet by mouth every 6 hours as needed for Pain    MAGNESIUM CITRATE 1.745 GM/30ML SOLUTION    Take 150 mLs by mouth 2 times daily as needed for Constipation (Take 150 ml by mouth x1 when necessary for constipation. May repeat this x1 one again at no results in 4 hours.)    ONDANSETRON (ZOFRAN ODT) 4 MG DISINTEGRATING TABLET    Take 1 tablet by mouth every 12 hours as needed for Nausea or Vomiting     Electronically signed by Fara Caba PA-C   DD: 12/17/21  **This report was transcribed using voice recognition software. Every effort was made to ensure accuracy; however, inadvertent computerized transcription errors may be present.   END OF PROVIDER NOTE       Fara Caba PA-C  12/17/21 [id number]

## 2021-12-19 LAB
EKG ATRIAL RATE: 48 BPM
EKG P AXIS: 10 DEGREES
EKG P-R INTERVAL: 178 MS
EKG Q-T INTERVAL: 494 MS
EKG QRS DURATION: 88 MS
EKG QTC CALCULATION (BAZETT): 441 MS
EKG R AXIS: 68 DEGREES
EKG T AXIS: 30 DEGREES
EKG VENTRICULAR RATE: 48 BPM

## 2021-12-19 PROCEDURE — 93010 ELECTROCARDIOGRAM REPORT: CPT | Performed by: INTERNAL MEDICINE

## 2022-01-08 ENCOUNTER — HOSPITAL ENCOUNTER (EMERGENCY)
Age: 32
Discharge: HOME | End: 2022-01-08
Payer: MEDICAID

## 2022-01-08 ENCOUNTER — APPOINTMENT (OUTPATIENT)
Dept: GENERAL RADIOLOGY | Age: 32
End: 2022-01-08
Payer: MEDICAID

## 2022-01-08 ENCOUNTER — APPOINTMENT (OUTPATIENT)
Dept: ULTRASOUND IMAGING | Age: 32
End: 2022-01-08
Payer: MEDICAID

## 2022-01-08 VITALS
RESPIRATION RATE: 18 BRPM | HEIGHT: 64 IN | OXYGEN SATURATION: 99 % | HEART RATE: 50 BPM | DIASTOLIC BLOOD PRESSURE: 59 MMHG | TEMPERATURE: 97.8 F | SYSTOLIC BLOOD PRESSURE: 110 MMHG | WEIGHT: 286 LBS | BODY MASS INDEX: 48.83 KG/M2

## 2022-01-08 DIAGNOSIS — M79.604 RIGHT LEG PAIN: Primary | ICD-10-CM

## 2022-01-08 PROCEDURE — 99283 EMERGENCY DEPT VISIT LOW MDM: CPT

## 2022-01-08 PROCEDURE — 93971 EXTREMITY STUDY: CPT | Performed by: RADIOLOGY

## 2022-01-08 PROCEDURE — 6370000000 HC RX 637 (ALT 250 FOR IP): Performed by: NURSE PRACTITIONER

## 2022-01-08 PROCEDURE — 73630 X-RAY EXAM OF FOOT: CPT

## 2022-01-08 PROCEDURE — 73562 X-RAY EXAM OF KNEE 3: CPT

## 2022-01-08 PROCEDURE — 93971 EXTREMITY STUDY: CPT

## 2022-01-08 RX ORDER — IBUPROFEN 800 MG/1
800 TABLET ORAL EVERY 6 HOURS PRN
Qty: 20 TABLET | Refills: 0 | Status: SHIPPED | OUTPATIENT
Start: 2022-01-08 | End: 2022-05-02

## 2022-01-08 RX ORDER — IBUPROFEN 800 MG/1
800 TABLET ORAL ONCE
Status: COMPLETED | OUTPATIENT
Start: 2022-01-08 | End: 2022-01-08

## 2022-01-08 RX ORDER — QUETIAPINE FUMARATE 50 MG/1
50 TABLET, EXTENDED RELEASE ORAL NIGHTLY
COMMUNITY
End: 2022-05-02

## 2022-01-08 RX ORDER — CLONIDINE HYDROCHLORIDE 0.2 MG/1
0.2 TABLET ORAL 2 TIMES DAILY
Status: ON HOLD | COMMUNITY
End: 2022-05-03 | Stop reason: SDUPTHER

## 2022-01-08 RX ORDER — METHADONE HYDROCHLORIDE 10 MG/1
85 TABLET ORAL NIGHTLY
COMMUNITY
End: 2022-05-02

## 2022-01-08 RX ADMIN — IBUPROFEN 800 MG: 800 TABLET, FILM COATED ORAL at 12:07

## 2022-01-08 ASSESSMENT — PAIN SCALES - GENERAL
PAINLEVEL_OUTOF10: 9
PAINLEVEL_OUTOF10: 9

## 2022-01-08 ASSESSMENT — PAIN DESCRIPTION - LOCATION: LOCATION: LEG

## 2022-01-08 ASSESSMENT — PAIN DESCRIPTION - ORIENTATION: ORIENTATION: RIGHT

## 2022-01-08 NOTE — PROGRESS NOTES
Patient in ultrasound waiting for transport to go to [place]. Patient no longer wants to wait and asked to leave I explained she would have to sign an [employer] form she agreed to that. I called the ER spoke to triage area for fast track explained she wants to leave AMA because she doesn't want to wait and the baby needs fed. They  voiced understanding.  Patient signed AMA form

## 2022-01-08 NOTE — ED PROVIDER NOTES
[address]  Department of Emergency Medicine   ED  Encounter Note  Admit Date/RoomTime: 2022 11:52 AM  ED Room: [facility]    NAME: Dexter Hurtado  : 1990  MRN: [id number]     Chief Complaint:  Leg Pain (Right leg/foot pain x 2 days)    History of Present Illness        Kecia Franks is a 36 y.o. old female who presents to the emergency department with her 5 -month-old daughter via transportation from [facility] and states that she is having difficulty ambulating on her right leg because she has pain is in her right thigh, knee and dorsum of the foot. She was getting methadone from the center. She reports that the pain started 2 days ago when she woke up. She denies any injury to the area. She denies any redness, swelling, fever, chills or rashes. She denies any back injury or pain. Patient has no prior history of pain/injury with regards to today's visit. Since onset the symptoms are persistent and gradually worsening and are mild-moderate in severity and wants to make sure she does not have a blood clot she denies any chest pain or shortness of breath. She is not breast feeding and denies any recent surgeries or travel. Denies any history of dvt's. Associated Signs & Symptoms:     []  Fever     []  Cough     []  Dyspnea     []  Hemoptysis     []  Chest Pain     Wells' Score Criteria for DVT: (possible score ? 2 to 9)      Active cancer (treatment within last 6 months or palliative) NO (0)     Calf swelling ?  3 cm compared to asymptomatic calf (measured 10 cm             below tibial tuberosity) NO (0)     Swollen unilateral superficial veins (non-varicose, in symptomatic leg) NO (0)     Unilateral pitting edema (in symptomatic leg): NO (0)     Previous documented DVT: NO (0)     Swelling of entire leg NO (0)      Localized tenderness along the deep venous system YES (1)     Paralysis, paresis, or recent cast immobilization of lower extremities NO (0)     Recently bedridden ? 3 days, or major surgery requiring regional or                    general anesthetic in the past 12 weeks NO (0)     Alternative diagnosis at least as likely NO (0)     TOTAL SCORE 1     * Those with Wells scores of two or more have a 28% chance of having DVT, those with a lower score have 6% odds. ** Alternatively, Wells scores can be categorized as high if greater than two, moderate if one or two, and low if less than one, with likelihoods of 53%, 17%, and 5 respectively. ROS   Pertinent positives and negatives are stated within HPI, all other systems reviewed and are negative. Past Medical History:  has a past medical history of Abnormal Pap smear of cervix, Anemia, Drug abuse ([address] Obesity, Overdose, and Preeclampsia complicating hypertension. Surgical History:  has no past surgical history on file. Social History:  reports that she has been smoking cigarettes. She has a 6.50 pack-year smoking history. She has never used smokeless tobacco. She reports previous drug use. Drug: Methamphetamines (Crystal Meth). She reports that she does not drink alcohol. Family History: family history is not on file. Allergies: Patient has no known allergies. Physical Exam   Oxygen Saturation Interpretation: Normal.        ED Triage Vitals [01/08/22 1137]   BP Temp Temp src Pulse Resp SpO2 Height Weight   110/59 97.8 °F (36.6 °C) -- 50 18 99 % -- --         Constitutional:  Alert, development consistent with age. HEENT:  NC/NT. Airway patent. Neck:  Normal ROM. Supple. Respiratory:  Clear to auscultation and breath sounds equal.  CV:  Regular rate and rhythm, normal heart sounds, without pathological murmurs, ectopy, gallops, or rubs. GI:  Abdomen Soft, nontender, good bowel sounds. No firm or pulsatile mass. Back: No midline bony tenderness right sided paraspinous muscle tenderness with no swelling or deformity. Pain over right SI joint.   Lower Ext.:  Right: leg. Tenderness: Moderate right calf and mid thigh tenderness and pain over the dorsum of right foot. Achilles tendon intact with no deformity            Swelling: None. Deformity: No.             ROM: full range with pain. Calf:  Right, No cords or calf tenderness. No significant calf/ankle edema. Positive Whitney's sign. Posterior calf tenderness present. Edema:  none Both lower extremity(s). Skin:  no wounds, erythema, or swelling. Distal Function:              Motor deficit: none. Sensory deficit: none. Full sensation intact to light touch in distal toes. Pulse deficit: none. 2 + pedal and posterior tibial pulses intact. Capillary refill: normal. Less than 3 seconds in distal toes. Gait: limp due to affected limb  Integument:  Normal turgor. Warm, dry, without visible rash, unless noted elsewhere. Neurological:  Oriented. Motor functions intact. Lab / Imaging Results   (All laboratory and radiology results have been personally reviewed by myself)  Labs:  No results found for this visit on 01/08/22. Imaging: All Radiology results interpreted by Radiologist unless otherwise noted. XR FOOT RIGHT (MIN 3 VIEWS)   Final Result   No acute osseous findings seen about the right knee nor right foot on these   exams. Normal alignment. RECOMMENDATION:   In the setting of trauma, if there is persistent symptoms and physical exam   warrants a repeat radiograph in 10-14 days could be considered as occult   fractures may not be evident on initial imaging evaluation. XR KNEE RIGHT (3 VIEWS)   Final Result   No acute osseous findings seen about the right knee nor right foot on these   exams. Normal alignment.       RECOMMENDATION:   In the setting of trauma, if there is persistent symptoms and physical exam   warrants a repeat radiograph in 10-14 days could be considered as occult fractures may not be evident on initial imaging evaluation. US DUP LOWER EXTREMITY RIGHT NGA   Final Result   Within the visualized vessels there is no evidence for deep venous   thrombosis                   ED Course / Medical Decision Making     Medications   ibuprofen (ADVIL;MOTRIN) tablet 800 mg (800 mg Oral Given 1/8/22 1207)      Reevaluation: 2564 notified by ultrasound tech that patient wants to sign out AMA because she wants to go home and feed her baby. She returned to the ED and decided she would want to take the x-rays and imaging remains and x-ray called. 1500 patient appears to be sleeping in wheelchair and her infant is sleeping as well. 1645 patient notified of findings of normal x-rays. Consult(s):   None    Procedure(s):   none    MDM:  Imaging was obtained based on moderate suspicion for fracture / bony abnormality, possible DVT as per history/physical findings. Xray reveals no acute fractures and since patient feels her right knee give out will give an immobilizer and have her follow-up with PCP for reevaluation I did not see any signs of joint effusion or any laxity on examination. She does have intact distal pulses. US reveals no DVT and instructed to follow-up with PCP in the next week for reevaluation. Patient was ambulating did not need crutches. She denies any chest pain or shortness of breath and symptoms did improve after Motrin. She is on methadone and cannot take anything stronger. Patient advised on signs and symptoms warranting immediate return to the ED for reevaluation. Plan of Care/Counseling:  JIM Grubbs CNP reviewed today's visit with the patient in addition to providing specific details for the plan of care and counseling regarding the diagnosis and prognosis. Questions are answered at this time and are agreeable with the plan. Assessment      1. Right leg pain      Plan   Discharged home.   Patient condition is good    New Medications Discharge Medication List as of 1/8/2022  4:50 PM      START taking these medications    Details   ibuprofen (ADVIL;MOTRIN) 800 MG tablet Take 1 tablet by mouth every 6 hours as needed for Pain, Disp-20 tablet, R-0Normal           Electronically signed by JIM Juarez CNP   DD: 1/8/22  **This report was transcribed using voice recognition software. Every effort was made to ensure accuracy; however, inadvertent computerized transcription errors may be present.   END OF ED PROVIDER NOTE      JIM Juarez CNP  01/08/22 [id number]

## 2022-05-02 ENCOUNTER — HOSPITAL ENCOUNTER (INPATIENT)
Age: 32
LOS: 6 days | Discharge: HOME | DRG: 201 | End: 2022-05-10
Attending: EMERGENCY MEDICINE | Admitting: INTERNAL MEDICINE
Payer: MEDICAID

## 2022-05-02 ENCOUNTER — APPOINTMENT (OUTPATIENT)
Dept: CT IMAGING | Age: 32
DRG: 201 | End: 2022-05-02
Payer: MEDICAID

## 2022-05-02 DIAGNOSIS — R07.9 CHEST PAIN, UNSPECIFIED TYPE: ICD-10-CM

## 2022-05-02 DIAGNOSIS — E07.81 EUTHYROID SICK SYNDROME: ICD-10-CM

## 2022-05-02 DIAGNOSIS — F19.10 POLYSUBSTANCE ABUSE (HCC): ICD-10-CM

## 2022-05-02 DIAGNOSIS — R00.1 BRADYCARDIA: Primary | ICD-10-CM

## 2022-05-02 LAB
ACETAMINOPHEN LEVEL: <5 MCG/ML (ref 10–30)
ALBUMIN SERPL-MCNC: 3.9 G/DL (ref 3.5–5.2)
ALP BLD-CCNC: 122 U/L (ref 35–104)
ALT SERPL-CCNC: 47 U/L (ref 0–32)
AMPHETAMINE SCREEN, URINE: NOT DETECTED
ANION GAP SERPL CALCULATED.3IONS-SCNC: 12 MMOL/L (ref 7–16)
AST SERPL-CCNC: 51 U/L (ref 0–31)
BARBITURATE SCREEN URINE: NOT DETECTED
BASOPHILS ABSOLUTE: 0.04 E9/L (ref 0–0.2)
BASOPHILS RELATIVE PERCENT: 0.5 % (ref 0–2)
BENZODIAZEPINE SCREEN, URINE: NOT DETECTED
BILIRUB SERPL-MCNC: 0.3 MG/DL (ref 0–1.2)
BILIRUBIN URINE: NEGATIVE
BLOOD, URINE: NEGATIVE
BUN BLDV-MCNC: 10 MG/DL (ref 6–20)
CALCIUM SERPL-MCNC: 9.8 MG/DL (ref 8.6–10.2)
CANNABINOID SCREEN URINE: NOT DETECTED
CHLORIDE BLD-SCNC: 103 MMOL/L (ref 98–107)
CLARITY: CLEAR
CO2: 24 MMOL/L (ref 22–29)
COCAINE METABOLITE SCREEN URINE: POSITIVE
COLOR: YELLOW
CREAT SERPL-MCNC: 0.9 MG/DL (ref 0.5–1)
D DIMER: 568 NG/ML DDU
EKG ATRIAL RATE: 33 BPM
EKG ATRIAL RATE: 36 BPM
EKG P AXIS: 51 DEGREES
EKG P AXIS: 7 DEGREES
EKG P-R INTERVAL: 202 MS
EKG P-R INTERVAL: 208 MS
EKG Q-T INTERVAL: 540 MS
EKG Q-T INTERVAL: 544 MS
EKG QRS DURATION: 74 MS
EKG QRS DURATION: 82 MS
EKG QTC CALCULATION (BAZETT): 402 MS
EKG QTC CALCULATION (BAZETT): 417 MS
EKG R AXIS: 61 DEGREES
EKG R AXIS: 64 DEGREES
EKG T AXIS: 27 DEGREES
EKG T AXIS: 28 DEGREES
EKG VENTRICULAR RATE: 33 BPM
EKG VENTRICULAR RATE: 36 BPM
EOSINOPHILS ABSOLUTE: 0.32 E9/L (ref 0.05–0.5)
EOSINOPHILS RELATIVE PERCENT: 3.8 % (ref 0–6)
ETHANOL: <10 MG/DL (ref 0–0.08)
FENTANYL SCREEN, URINE: NOT DETECTED
GFR AFRICAN AMERICAN: >60
GFR NON-AFRICAN AMERICAN: >60 ML/MIN/1.73
GLUCOSE BLD-MCNC: 105 MG/DL (ref 74–99)
GLUCOSE URINE: NEGATIVE MG/DL
HCG(URINE) PREGNANCY TEST: NEGATIVE
HCG, URINE, POC: NEGATIVE
HCT VFR BLD CALC: 33.8 % (ref 34–48)
HEMOGLOBIN: 11.4 G/DL (ref 11.5–15.5)
IMMATURE GRANULOCYTES #: 0.02 E9/L
IMMATURE GRANULOCYTES %: 0.2 % (ref 0–5)
KETONES, URINE: NEGATIVE MG/DL
LEUKOCYTE ESTERASE, URINE: NEGATIVE
LYMPHOCYTES ABSOLUTE: 2.05 E9/L (ref 1.5–4)
LYMPHOCYTES RELATIVE PERCENT: 24.1 % (ref 20–42)
Lab: ABNORMAL
Lab: NORMAL
MAGNESIUM: 1.9 MG/DL (ref 1.6–2.6)
MCH RBC QN AUTO: 31.8 PG (ref 26–35)
MCHC RBC AUTO-ENTMCNC: 33.7 % (ref 32–34.5)
MCV RBC AUTO: 94.2 FL (ref 80–99.9)
METHADONE SCREEN, URINE: POSITIVE
MONOCYTES ABSOLUTE: 0.51 E9/L (ref 0.1–0.95)
MONOCYTES RELATIVE PERCENT: 6 % (ref 2–12)
NEGATIVE QC PASS/FAIL: NORMAL
NEUTROPHILS ABSOLUTE: 5.57 E9/L (ref 1.8–7.3)
NEUTROPHILS RELATIVE PERCENT: 65.4 % (ref 43–80)
NITRITE, URINE: NEGATIVE
OPIATE SCREEN URINE: NOT DETECTED
OXYCODONE URINE: NOT DETECTED
PDW BLD-RTO: 13.2 FL (ref 11.5–15)
PH UA: 6 (ref 5–9)
PHENCYCLIDINE SCREEN URINE: NOT DETECTED
PLATELET # BLD: 291 E9/L (ref 130–450)
PMV BLD AUTO: 9.7 FL (ref 7–12)
POSITIVE QC PASS/FAIL: NORMAL
POTASSIUM REFLEX MAGNESIUM: 3.8 MMOL/L (ref 3.5–5)
PROTEIN UA: NEGATIVE MG/DL
RBC # BLD: 3.59 E12/L (ref 3.5–5.5)
SALICYLATE, SERUM: <0.3 MG/DL (ref 0–30)
SODIUM BLD-SCNC: 139 MMOL/L (ref 132–146)
SPECIFIC GRAVITY UA: 1.02 (ref 1–1.03)
T4 TOTAL: 4.3 MCG/DL (ref 4.5–11.7)
TOTAL PROTEIN: 8.1 G/DL (ref 6.4–8.3)
TRICYCLIC ANTIDEPRESSANTS SCREEN SERUM: NEGATIVE NG/ML
TROPONIN, HIGH SENSITIVITY: <6 NG/L (ref 0–9)
TSH SERPL DL<=0.05 MIU/L-ACNC: 1.34 UIU/ML (ref 0.27–4.2)
UROBILINOGEN, URINE: 0.2 E.U./DL
WBC # BLD: 8.5 E9/L (ref 4.5–11.5)

## 2022-05-02 PROCEDURE — 96361 HYDRATE IV INFUSION ADD-ON: CPT

## 2022-05-02 PROCEDURE — 36415 COLL VENOUS BLD VENIPUNCTURE: CPT

## 2022-05-02 PROCEDURE — 85378 FIBRIN DEGRADE SEMIQUANT: CPT

## 2022-05-02 PROCEDURE — G0378 HOSPITAL OBSERVATION PER HR: HCPCS

## 2022-05-02 PROCEDURE — 84443 ASSAY THYROID STIM HORMONE: CPT

## 2022-05-02 PROCEDURE — 82077 ASSAY SPEC XCP UR&BREATH IA: CPT

## 2022-05-02 PROCEDURE — 84484 ASSAY OF TROPONIN QUANT: CPT

## 2022-05-02 PROCEDURE — 80061 LIPID PANEL: CPT

## 2022-05-02 PROCEDURE — 6360000002 HC RX W HCPCS: Performed by: EMERGENCY MEDICINE

## 2022-05-02 PROCEDURE — 93010 ELECTROCARDIOGRAM REPORT: CPT | Performed by: INTERNAL MEDICINE

## 2022-05-02 PROCEDURE — 85025 COMPLETE CBC W/AUTO DIFF WBC: CPT

## 2022-05-02 PROCEDURE — 6360000002 HC RX W HCPCS

## 2022-05-02 PROCEDURE — 80307 DRUG TEST PRSMV CHEM ANLYZR: CPT

## 2022-05-02 PROCEDURE — 93005 ELECTROCARDIOGRAM TRACING: CPT | Performed by: STUDENT IN AN ORGANIZED HEALTH CARE EDUCATION/TRAINING PROGRAM

## 2022-05-02 PROCEDURE — 80179 DRUG ASSAY SALICYLATE: CPT

## 2022-05-02 PROCEDURE — 96374 THER/PROPH/DIAG INJ IV PUSH: CPT

## 2022-05-02 PROCEDURE — 80143 DRUG ASSAY ACETAMINOPHEN: CPT

## 2022-05-02 PROCEDURE — 81003 URINALYSIS AUTO W/O SCOPE: CPT

## 2022-05-02 PROCEDURE — 93005 ELECTROCARDIOGRAM TRACING: CPT | Performed by: EMERGENCY MEDICINE

## 2022-05-02 PROCEDURE — 2580000003 HC RX 258: Performed by: STUDENT IN AN ORGANIZED HEALTH CARE EDUCATION/TRAINING PROGRAM

## 2022-05-02 PROCEDURE — 84436 ASSAY OF TOTAL THYROXINE: CPT

## 2022-05-02 PROCEDURE — 6360000004 HC RX CONTRAST MEDICATION: Performed by: STUDENT IN AN ORGANIZED HEALTH CARE EDUCATION/TRAINING PROGRAM

## 2022-05-02 PROCEDURE — 96375 TX/PRO/DX INJ NEW DRUG ADDON: CPT

## 2022-05-02 PROCEDURE — 2140000000 HC CCU INTERMEDIATE R&B

## 2022-05-02 PROCEDURE — 81025 URINE PREGNANCY TEST: CPT

## 2022-05-02 PROCEDURE — 99285 EMERGENCY DEPT VISIT HI MDM: CPT

## 2022-05-02 PROCEDURE — 80053 COMPREHEN METABOLIC PANEL: CPT

## 2022-05-02 PROCEDURE — 71275 CT ANGIOGRAPHY CHEST: CPT

## 2022-05-02 PROCEDURE — 99254 IP/OBS CNSLTJ NEW/EST MOD 60: CPT | Performed by: STUDENT IN AN ORGANIZED HEALTH CARE EDUCATION/TRAINING PROGRAM

## 2022-05-02 PROCEDURE — 83735 ASSAY OF MAGNESIUM: CPT

## 2022-05-02 RX ORDER — HYDRALAZINE HYDROCHLORIDE 20 MG/ML
10 INJECTION INTRAMUSCULAR; INTRAVENOUS EVERY 8 HOURS PRN
Status: DISCONTINUED | OUTPATIENT
Start: 2022-05-02 | End: 2022-05-10 | Stop reason: HOSPADM

## 2022-05-02 RX ORDER — SODIUM CHLORIDE 0.9 % (FLUSH) 0.9 %
10 SYRINGE (ML) INJECTION
Status: ACTIVE | OUTPATIENT
Start: 2022-05-02 | End: 2022-05-02

## 2022-05-02 RX ORDER — SODIUM CHLORIDE 9 MG/ML
INJECTION, SOLUTION INTRAVENOUS PRN
Status: DISCONTINUED | OUTPATIENT
Start: 2022-05-02 | End: 2022-05-10 | Stop reason: HOSPADM

## 2022-05-02 RX ORDER — METHADONE HYDROCHLORIDE 10 MG/ML
155 CONCENTRATE ORAL EVERY MORNING
Status: CANCELLED | OUTPATIENT
Start: 2022-05-03

## 2022-05-02 RX ORDER — QUETIAPINE FUMARATE 100 MG/1
100 TABLET, FILM COATED ORAL 2 TIMES DAILY
Status: DISCONTINUED | OUTPATIENT
Start: 2022-05-02 | End: 2022-05-10 | Stop reason: HOSPADM

## 2022-05-02 RX ORDER — METHADONE HYDROCHLORIDE 10 MG/ML
45 CONCENTRATE ORAL EVERY EVENING
Status: DISCONTINUED | OUTPATIENT
Start: 2022-05-02 | End: 2022-05-10 | Stop reason: HOSPADM

## 2022-05-02 RX ORDER — METHADONE HYDROCHLORIDE 10 MG/ML
45 CONCENTRATE ORAL EVERY EVENING
COMMUNITY
End: 2022-06-10

## 2022-05-02 RX ORDER — METHADONE HYDROCHLORIDE 10 MG/ML
155 CONCENTRATE ORAL EVERY MORNING
Status: DISCONTINUED | OUTPATIENT
Start: 2022-05-03 | End: 2022-05-10 | Stop reason: HOSPADM

## 2022-05-02 RX ORDER — CLONIDINE HYDROCHLORIDE 0.2 MG/1
0.2 TABLET ORAL 2 TIMES DAILY
Status: DISCONTINUED | OUTPATIENT
Start: 2022-05-02 | End: 2022-05-06

## 2022-05-02 RX ORDER — ACETAMINOPHEN 650 MG/1
650 SUPPOSITORY RECTAL EVERY 6 HOURS PRN
Status: DISCONTINUED | OUTPATIENT
Start: 2022-05-02 | End: 2022-05-10 | Stop reason: HOSPADM

## 2022-05-02 RX ORDER — METHADONE HYDROCHLORIDE 10 MG/ML
45 CONCENTRATE ORAL EVERY EVENING
Status: CANCELLED | OUTPATIENT
Start: 2022-05-02

## 2022-05-02 RX ORDER — ONDANSETRON 2 MG/ML
4 INJECTION INTRAMUSCULAR; INTRAVENOUS ONCE
Status: COMPLETED | OUTPATIENT
Start: 2022-05-02 | End: 2022-05-02

## 2022-05-02 RX ORDER — METHADONE HYDROCHLORIDE 10 MG/ML
100 CONCENTRATE ORAL EVERY MORNING
COMMUNITY

## 2022-05-02 RX ORDER — SODIUM CHLORIDE 0.9 % (FLUSH) 0.9 %
5-40 SYRINGE (ML) INJECTION PRN
Status: DISCONTINUED | OUTPATIENT
Start: 2022-05-02 | End: 2022-05-10 | Stop reason: HOSPADM

## 2022-05-02 RX ORDER — ATROPINE SULFATE 0.4 MG/ML
0.5 AMPUL (ML) INJECTION ONCE
Status: DISCONTINUED | OUTPATIENT
Start: 2022-05-02 | End: 2022-05-02

## 2022-05-02 RX ORDER — POLYETHYLENE GLYCOL 3350 17 G/17G
17 POWDER, FOR SOLUTION ORAL DAILY PRN
Status: DISCONTINUED | OUTPATIENT
Start: 2022-05-02 | End: 2022-05-04

## 2022-05-02 RX ORDER — SODIUM CHLORIDE 0.9 % (FLUSH) 0.9 %
5-40 SYRINGE (ML) INJECTION EVERY 12 HOURS SCHEDULED
Status: DISCONTINUED | OUTPATIENT
Start: 2022-05-02 | End: 2022-05-10 | Stop reason: HOSPADM

## 2022-05-02 RX ORDER — ATROPINE SULFATE 0.1 MG/ML
0.5 INJECTION INTRAVENOUS ONCE
Status: COMPLETED | OUTPATIENT
Start: 2022-05-02 | End: 2022-05-02

## 2022-05-02 RX ORDER — QUETIAPINE FUMARATE 100 MG/1
100 TABLET, FILM COATED ORAL 2 TIMES DAILY
Status: ON HOLD | COMMUNITY
End: 2022-05-10 | Stop reason: SDUPTHER

## 2022-05-02 RX ORDER — ATROPINE SULFATE 0.1 MG/ML
INJECTION INTRAVENOUS
Status: DISCONTINUED
Start: 2022-05-02 | End: 2022-05-02

## 2022-05-02 RX ORDER — 0.9 % SODIUM CHLORIDE 0.9 %
1000 INTRAVENOUS SOLUTION INTRAVENOUS ONCE
Status: COMPLETED | OUTPATIENT
Start: 2022-05-02 | End: 2022-05-02

## 2022-05-02 RX ORDER — NICOTINE 21 MG/24HR
1 PATCH, TRANSDERMAL 24 HOURS TRANSDERMAL DAILY
Status: DISCONTINUED | OUTPATIENT
Start: 2022-05-03 | End: 2022-05-10 | Stop reason: HOSPADM

## 2022-05-02 RX ORDER — ACETAMINOPHEN 325 MG/1
650 TABLET ORAL EVERY 6 HOURS PRN
Status: DISCONTINUED | OUTPATIENT
Start: 2022-05-02 | End: 2022-05-10 | Stop reason: HOSPADM

## 2022-05-02 RX ADMIN — ATROPINE SULFATE 0.5 MG: 0.1 INJECTION, SOLUTION ENDOTRACHEAL; INTRAMUSCULAR; INTRAVENOUS; SUBCUTANEOUS at 15:34

## 2022-05-02 RX ADMIN — IOPAMIDOL 60 ML: 755 INJECTION, SOLUTION INTRAVENOUS at 14:26

## 2022-05-02 RX ADMIN — SODIUM CHLORIDE 1000 ML: 9 INJECTION, SOLUTION INTRAVENOUS at 11:56

## 2022-05-02 RX ADMIN — ATROPINE SULFATE 0.5 MG: 0.1 INJECTION INTRAVENOUS at 15:34

## 2022-05-02 RX ADMIN — ONDANSETRON HYDROCHLORIDE 4 MG: 2 SOLUTION INTRAMUSCULAR; INTRAVENOUS at 16:19

## 2022-05-02 ASSESSMENT — ENCOUNTER SYMPTOMS
ABDOMINAL PAIN: 0
CHEST TIGHTNESS: 1
BACK PAIN: 0
COLOR CHANGE: 0
SORE THROAT: 0
CONSTIPATION: 0
EYE PAIN: 0
SHORTNESS OF BREATH: 1
NAUSEA: 0
VOMITING: 0
RHINORRHEA: 0
DIARRHEA: 0
PHOTOPHOBIA: 0
ABDOMINAL DISTENTION: 0
COUGH: 0
BLOOD IN STOOL: 0

## 2022-05-02 ASSESSMENT — PAIN DESCRIPTION - LOCATION: LOCATION: CHEST

## 2022-05-02 ASSESSMENT — PAIN - FUNCTIONAL ASSESSMENT: PAIN_FUNCTIONAL_ASSESSMENT: 0-10

## 2022-05-02 ASSESSMENT — PAIN SCALES - GENERAL
PAINLEVEL_OUTOF10: 10
PAINLEVEL_OUTOF10: 0

## 2022-05-02 NOTE — CONSULTS
[address] [facility],2[address] Floor and [address] [facility] Electrophysiology  Consultation Report  PATIENT: Bradley Lorenzana RECORD NUMBER: [id number]  DATE OF SERVICE:  5/2/2022  ATTENDING ELECTROPHYSIOLOGIST: Yusuf Tolbert DO   PRIMARY ELECTROPHYSIOLOGIST: Ally Morrow   REFERRING PHYSICIAN: Lucy Valle MD  CHIEF COMPLAINT: Chest pain. HPI: Esau Martinez is a 36 y.o. female with a history of hypertension, ongoing tobacco abuse, illicit drug (fentanyl/cocaine) drug use with multiple accidental overdoses, hypertension, mood disorder, sinus reji cardia since 09/06/21. She is not known to electrophysiology. Current medications include Catapres 0.2 mg twice daily, Seroquel, methadone, fentanyl, Zofran. She was in her normal state of health yesterday then at midnight developed an episode of nausea, diaphoresis associated with chest pain at that time and then went back to bed, she awoke at 9AM, again with chest discomfort that she describes as a tightness across her chest with associated shortness of breath this pain that has been constant and worsening in severity since 9:00. she did experience increased shortness of breath and near syncope with ambulation but no true syncope. Upon presentation she was found to have sinus bradycardia with rare non conducted P-waves. She notes the pain is worse when taking a deep breath in, or laying supine. She also notes BLE edema. Cardiac electrophysiology service is consulted for Bradycardia.     Prior Cardiac testing:   · ECG 05/02/22: SB 33 bpm, QRS 92ms, QTc 402ms   · ECG 12/17/21: SB rate 48 bpm QRS 88ms QTc 441ms   · ECG 09/06/21: SB rate 51ms, QRS 84ms QTc 436ms  · ECG 08/05/21: SR rate 80 BPM, QRS 82ms, QTc 484ms       Patient Active Problem List    Diagnosis Date Noted    Preeclampsia complicating hypertension 04/23/2021    Abdominal pain affecting pregnancy 04/19/2021    38 weeks gestation of pregnancy 04/19/2021    Supervision of high risk pregnancy in third trimester 2021    Encounter for  screening for malformation using ultrasound 2021    Normal pregnancy, antepartum 2021    28 weeks gestation of pregnancy     Nausea vomiting and diarrhea     Tenosynovitis 2019    Encounter for fetal anatomic survey 2013    Suspected shortening of cervix not found 2013       Past Medical History:   Diagnosis Date    Abnormal Pap smear of cervix     Anemia     Drug abuse ([facility] [address]     Obesity     Overdose     multiple    Preeclampsia complicating hypertension 2021       History reviewed. No pertinent family history. Social History     Tobacco Use    Smoking status: Current Every Day Smoker     Packs/day: 0.50     Years: 13.00     Pack years: 6.50     Types: Cigarettes    Smokeless tobacco: Never Used   Substance Use Topics    Alcohol use: No       Current Facility-Administered Medications   Medication Dose Route Frequency Provider Last Rate Last Admin    0.9 % sodium chloride bolus  1,000 mL IntraVENous Once [facility], DO 1,000 mL/hr at 22 1156 1,000 mL at 22 1156     Current Outpatient Medications   Medication Sig Dispense Refill    methadone (DOLOPHINE) 10 MG/ML solution Take 155 mg by mouth every morning.  methadone (DOLOPHINE) 10 MG/ML solution Take 45 mg by mouth every evening.  QUEtiapine (SEROQUEL) 100 MG tablet Take 100 mg by mouth 2 times daily      cloNIDine (CATAPRES) 0.2 MG tablet Take 0.2 mg by mouth 2 times daily          No Known Allergies    ROS:   Constitutional: Negative for fever, + for  activity change and appetite change. HENT: Negative for epistaxis. Eyes: Negative for diploplia, blurred vision. Respiratory: Negative for cough, chest tightness,  and wheezing. + for shortness of breath   Cardiovascular: pertinent positives in HPI  Gastrointestinal: Negative for abdominal pain and blood in stool.    All other review of systems are negative     PHYSICAL EXAM:   Vitals:    05/02/22 1204 05/02/22 1213 05/02/22 1233 05/02/22 1240   BP: (!) 164/84 (!) 160/85 (!) 160/83    Pulse: (!) 31 (!) 31 (!) 32 (!) 30   Resp: (!) 7 9 (!) 7 11   Temp:       SpO2: 100% 100% 100% 100%   Weight:          Constitutional: In NAD examined in the ER  Head: Normocephalic and atraumatic. Neck: No hepatojugular reflux and no JVD present  Cardiovascular: S1 , S2 present Bradycardic   Pulmonary/Chest:  No respiratory distress. Abdominal: Soft. Normal appearance  Rotund. Musculoskeletal: Normal range of motion of all extremities  Neurological: Alert    Skin: Skin is warm and dry. Extremity:   No edema. Psychiatric: Normal mood and affect. Thought content normal.       I have personally reviewed the laboratory, cardiac diagnostic and radiographic testing as outlined below:    Data:    Recent Labs     05/02/22  1151   WBC 8.5   HGB 11.4*   HCT 33.8*        Recent Labs     05/02/22  1151      K 3.8      CO2 24   BUN 10   CREATININE 0.9   CALCIUM 9.8      Lab Results   Component Value Date    MG 1.8 04/23/2021     Recent Labs     05/02/22  1151   TSH 1.340     No results for input(s): INR in the last 72 hours. Telemetry: Sinus rates 34 BPM,       I have independently reviewed all of the ECGs and rhythm strips per above     Assessment/Plan:     1. Sinus bradycardia in the setting of multiple AV yennifer blocking agents including methadone, clonidine, with sinus bradycardia documented continuously since September 2021. No high-grade AV blocks nor concerning features on ECG including long QT, advanced heart block, accessory pathway. Consider stopping clonidine and/or methadone which are most likely the causative pharmacological agents. EP to sign off please call if needed.      2.  Chest discomfort has been continuous since approximately 9 AM with a negative troponin noted increase in symptoms with deep respiration and laying supine can consider pulmonic GI source. 3.  Hypertension    4. Illicit drug use including cocaine and fentanyl.   -Ongoing methadone treatment    Mood disorder  -Seroquel    Thank you for allowing me to participate in your patient's care. Please call me if there are any questions or concerns. Discussed with JIM Miranda - CNP  Cardiac Electrophysiology  [facility]) Physicians  [facility] [place]: [facility] Elect[facility]  12:43 PM  5/2/2022    Young female with history of asymptomatic sinus bradycardia, morbid obesity, polysubstance abuse and HTN, which is managed with methadone, Seroquel, clonidine. Her tox screen is positive for cocaine. She is on multiple medications which can cause sinus bradycardia. Additionally she has a longstanding history of asymptomatic sinus bradycardia. She does complain of pleuritic chest pain (exacerbated by supine position and deep inspiration) and cough. Consider testing for pulmonary infection. Recommend she stop polysubstance abuse. Consider changing if medications associated with bradycardia to alternative if concern for bradycardia causing symptoms in future. This should be done by physicians prescribing those medications and managing those disorders. EP service will sign off. Please contact with questions concerns.     Attending Supervising Physicians Attestation Statement  I was present with the nurse practitioner during the history and exam. I discussed the findings and plans with the nurse practitioner and agree as documented in his note     Electronically signed by Anthony Gamboa DO on 5/3/22 at 4:36 PM EDT

## 2022-05-02 NOTE — ED NOTES
Pt sleeping with no distress, resp easy, skin warm and dry, woke pt and attempting a urine specimen. Pt given warm blankets and gown placed. Pt denies pain at this time.      Alexys Judd RN  05/02/22 1527

## 2022-05-02 NOTE — Clinical Note
Discharge Plan[de-identified] Other/[facility])   Telemetry/Cardiac Monitoring Required?: Yes

## 2022-05-02 NOTE — H&P
[facility]  Internal Medicine Residency Program  History and Physical    Patient:  Noble Villalpando 36 y.o. female MRN: [id number]     Date of Service: 5/2/2022    Hospital Day: 1      Chief complaint: had concerns including Chest Pain (STARTING LAST NIGHT, WORSE THIS AM). History of Present Illness   The patient is a 36 y.o. female with a past medical history of polysubstance abuse (cocaine, amphetamine, tobacco), obesity, preeclampsia, multiple drug overdose, mood disorder presented with CC of nausea diaphoresis and chest pain since last night. Chest pain was sudden in onset, constant, with intensity rating from 5/10-8/10, nonradiating, pressure-like in character, not associated with exertion and not better with rest.  Chest pain continued throughout the night and patient had an near syncopal episode in the morning. EMS was called and found to have bradycardia with HR 30s resulting in the ED visit. Patient also reports having 1 episode of dark brown vomiting. She also reports having 1 episode of black stool 2 days back. Patient denies any difficulty breathing, palpitations, blurry vision, sick contacts, cough, abdominal pain, back pain, urinary complaints. Patient has history of polysubstance abuse and reports using cocaine 2 days back. She is on methadone(155 mg in the morning/45 mg in evening) treatment from [place]. Patient also takes clonidine. Patient is an active smoker with 13-pack-year smoking history. Patient denies any other recent drug abuse.     In ED: Patient found to be hypertensive with - 220 mmHg, HR 30s, SPO2 90 to 97% on room air  Labs: BMP unremarkable except K3.8, troponin <6, , ALT 47, AST 51, TSH 1.34, total T4 4.3  SDS negative, urine drug screen positive for methadone and cocaine  EKG: Sinus bradycardia without ST segment elevation  CTA chest: Negative for PE    EP was consulted and as per recommendations methadone and clonidine (AV yennifer blocking agents) was held. Patient received 1 dose of atropine 0.5 mg IV once, which resulted in resolution of bradycardia with HR in 90s. Patient did have acute increase in blood pressure to 220/129 mm Hg. hydralazine 10 mg IV q. 6 hourly as needed was ordered. Patient was admitted to internal medicine house team 2 for further management and treatment. Past Medical History:      Diagnosis Date    Abnormal Pap smear of cervix     Anemia     Drug abuse ([facility] [address]     Obesity     Overdose     multiple    Preeclampsia complicating hypertension 4/23/2021       Past Surgical History:    History reviewed. No pertinent surgical history. Medications Prior to Admission:    Prior to Admission medications    Medication Sig Start Date End Date Taking? Authorizing Provider   methadone (DOLOPHINE) 10 MG/ML solution Take 155 mg by mouth every morning. Yes Historical Provider, MD   methadone (DOLOPHINE) 10 MG/ML solution Take 45 mg by mouth every evening. Yes Historical Provider, MD   QUEtiapine (SEROQUEL) 100 MG tablet Take 100 mg by mouth 2 times daily   Yes Historical Provider, MD   cloNIDine (CATAPRES) 0.2 MG tablet Take 0.2 mg by mouth 2 times daily    Historical Provider, MD   ketorolac (TORADOL) 10 MG tablet Take 1 tablet by mouth every 6 hours as needed for Pain 12/17/21 1/8/22  Glenda Das PA-C       Allergies:  Patient has no known allergies. Social History:   TOBACCO:   reports that she has been smoking cigarettes. She has a 6.50 pack-year smoking history. She has never used smokeless tobacco.  ETOH:   reports no history of alcohol use. Family History:   History reviewed. No pertinent family history. REVIEW OF SYSTEMS:    · Constitutional: No fever, no chills, no change in weight; good appetite  · HEENT: No blurred vision, no ear problems, no sore throat, no rhinorrhea.   · Respiratory: No cough, no sputum production, (+) chest pain, no shortness of breath  · Cardiology:  no dyspnea on exertion, no paroxysmal nocturnal dyspnea, no orthopnea, no palpitation, no leg swelling. · Gastroenterology: No dysphagia, no reflux; no abdominal pain,(+) nausea or vomiting; no constipation or diarrhea.  (+) hematochezia   · Genitourinary: No dysuria, no frequency, hesitancy; no hematuria  · Musculoskeletal: no joint pain, no myalgia, no change in range of movement  · Neurology: no focal weakness in extremities, no slurred speech, no double vision, no tingling or numbness sensation  · Endocrinology: no temperature intolerance, no polyphagia, polydipsia or polyuria  · Hematology: no increased bleeding, no bruising, no lymphadenopathy  · Skin: no skin changes noticed by patient  · Psychology: no depressed mood, no suicidal ideation    Physical Exam   · Vitals: BP (!) 134/109   Pulse 85   Temp 98.4 °F (36.9 °C)   Resp (!) 7   Wt 280 lb (127 kg)   SpO2 (!) 82%   BMI 48.06 kg/m²     · General Appearance: alert and oriented to person, place and time, well developed and well- nourished,  · Skin: warm and dry, no rash or erythema  · Head: normocephalic and atraumatic  · Eyes: pupils equal, round, and reactive to light, extraocular eye movements intact, conjunctivae normal  · ENT: tympanic membrane, external ear and ear canal normal bilaterally, nose without deformity, nasal mucosa and turbinates normal without polyps  · Neck: supple and non-tender without mass, no thyromegaly or thyroid nodules, no cervical lymphadenopathy  · Pulmonary/Chest: clear to auscultation bilaterally- no wheezes, rales or rhonchi, normal air movement, no respiratory distress  · Cardiovascular:  normal S1 and S2, bradycardia noted   · abdomen: soft, non-tender, non-distended, normal bowel sounds, no masses or organomegaly  · Extremities: no cyanosis, clubbing or edema  · Musculoskeletal: normal range of motion, no joint swelling, deformity or tenderness  · Neurologic: reflexes normal and symmetric, no cranial nerve deficit, speech normal   Labs and Imaging Studies   Basic Labs  Recent Labs     05/02/22  1151      K 3.8      CO2 24   BUN 10   CREATININE 0.9   GLUCOSE 105*   CALCIUM 9.8       Recent Labs     05/02/22  1151   WBC 8.5   RBC 3.59   HGB 11.4*   HCT 33.8*   MCV 94.2   MCH 31.8   MCHC 33.7   RDW 13.2      MPV 9.7         Imaging Studies:  CTA PULMONARY W CONTRAST   Final Result   1. Minimal dependent atelectasis seen within the right lung base posteriorly   and trace right pleural effusion. 2. There is no pulmonary embolus              EKG: sinus bradycardia, with no ST or T wave changes. Resident's Assessment and Plan     Assessment:    1. Sinus bradycardia 2/2 multiple AV yennifer blocking agents (methadone, clonidine)  2. Noncardiac chest pain 2/2 ? Gastritis  3. Hematemesis 2/2 gastritis/ ? ulcer  4. Hypertensive urgency 2/2 ? cocaine use  5. Polysubstance abuse-UDS positive for cocaine/methadone(ongoing treatment)  6. Mood disorder-on Seroquel    Plan:  · Follow EP recs   · Methadone/clonidine held for bradycardia-   · Do not use beta-blocker 2/2 bradycardia  · Follow TSH results   · Injection Protonix 40 mg IV daily   · Keep n.p.o.   · Monitor blood pressure, hydralazine as needed ordered   · Follow FOBT   · Injection Tigan 200 mg IM as needed for nausea   · Monitor heart rate  · Monitor for hemoptysis  · Hold off on chemical VTE prophylaxis in setting of hemoptysis      PT/OT evaluation:   DVT prophylaxis/ GI prophylaxis: PCD's/Protonix  Disposition: admit to house team 2    Selene Stapleton MD, PGY-1  Attending physician: Dr. Rachael Choi  [facility]    Attending Physician Statement:  Dereck Holt M.D., F.A.C.P. I have discussed the case, including pertinent history and exam findings with the resident/NP. I have seen and examined the patient and the key elements of the encounter have been performed by me.   I agree with the resident ROS, PMHx, PSHx, meds reviewed and assessment, plan and orders as documented by the resident/NP      Hospital charts reviewed, including other providers notes, relevant labs and imaging. Vomiting x1 prior to admission- doubt vagal    Sinus reji-- most clonidine   Possible clonidine overdose  (OD hx)  42 pillls every 10 days == 4.2 tabs daily ave,maybe more  Clonidine and methadone held- on recommendation of EP  Very rare --seroquel  Occasional  Live Cirri-- methadone    She is very adamant that she is PUT BACK ON HER METHADONE  -- noted to be on a very large dose. methadone    Also chronic cocaine can damage cardiac nerve fibers and cause sinus reji and blocks  (hx of active polysubstance +cocaine)  tsh N, ?borderline low T4-- reassess outpatient, hypothyroid vs variable  EP not concerned about ekg findings, recommending observing overnight-- pacer and atropine deferred    HR 40s asymptomatic  Plan ambulation if HR inc- doubt significant chronotropic insufficiency  Observation if stable, later today DC or she might leave AMA  >50% of time spent coordinating care with other providers and/or counseling patient/family  Remainder of medical problems as per resident note.

## 2022-05-02 NOTE — ED PROVIDER NOTES
Name: Kandis Monzon   MRN: [id number]     --------------------------------------------- History of Present Illness ---------------------------------------------  5/2/22, Time: 11:41 AM EDT   Chief Complaint   Patient presents with    Chest Pain     STARTING LAST NIGHT, WORSE THIS AM      HPI    Kandis Monzon is a 36 y.o. female, with hx of drug abuse (currently on methadone and recently had change of dose to 155 in morning and 45 in evening) also on clonidine, who presents to the ED today for chest pain, which began last night and worsened this morning. Patient relates chest pain is moderate, describes as tightness, across the front of her chest, associated mild shortness of breath the patient and lightheadedness. Describes symptoms as constant, moderate in severity, no exacerbating or relieving factors. She has not felt this way before. States she has been on clonidine for the past year without any changes in this dosage. The pt denies any associated fever,  dizziness, HA, n/v, abd pain, GI or  complaints. Denies any history of thyroid problems. Allg: Patient has no known allergies. PCP: No primary care provider on file. .    Meds:   Current Facility-Administered Medications:     [Held by provider] cloNIDine (CATAPRES) tablet 0.2 mg, 0.2 mg, Oral, BID, Isis Dee MD    QUEtiapine (SEROQUEL) tablet 100 mg, 100 mg, Oral, BID, Isis Dee MD    sodium chloride flush 0.9 % injection 5-40 mL, 5-40 mL, IntraVENous, 2 times per day, Isis Dee MD    sodium chloride flush 0.9 % injection 5-40 mL, 5-40 mL, IntraVENous, PRN, Isis Dee MD    0.9 % sodium chloride infusion, , IntraVENous, PRN, Isis Dee MD    polyethylene glycol (GLYCOLAX) packet 17 g, 17 g, Oral, Daily PRN, Isis Dee MD    acetaminophen (TYLENOL) tablet 650 mg, 650 mg, Oral, Q6H PRN **OR** acetaminophen (TYLENOL) suppository 650 mg, 650 mg, Rectal, Q6H PRN, Isis Dee MD    trimethobenzamide [facility]) injection 200 mg, 200 mg, IntraMUSCular, Q6H PRN, Nancy Dixon MD    pantoprazole (PROTONIX) 40 mg in sodium chloride (PF) 10 mL injection, 40 mg, IntraVENous, Daily, Nancy Dixon MD, 40 mg at 05/03/22 1114    hydrALAZINE (APRESOLINE) injection 10 mg, 10 mg, IntraVENous, Q8H PRN, Nancy Dixon MD    methadone (DOLOPHINE) 10 MG/ML solution 155 mg, 155 mg, Oral, QAM, Nancy Dixon MD, 155 mg at 05/03/22 1054    methadone (DOLOPHINE) 10 MG/ML solution 45 mg, 45 mg, Oral, QPM, Nancy iDxon MD    nicotine (NICODERM CQ) 14 MG/24HR 1 patch, 1 patch, TransDERmal, Daily, Nancy Dixon MD, 1 patch at 05/03/22 1114     Review of Systems   Constitutional: Negative for chills, fatigue and fever. HENT: Negative for congestion, rhinorrhea and sore throat. Eyes: Negative for photophobia, pain and visual disturbance. Respiratory: Positive for chest tightness and shortness of breath. Negative for cough. Cardiovascular: Positive for chest pain. Negative for palpitations and leg swelling. Gastrointestinal: Negative for abdominal distention, abdominal pain, blood in stool, constipation, diarrhea, nausea and vomiting. Genitourinary: Negative for difficulty urinating, dysuria, hematuria, vaginal bleeding and vaginal discharge. Musculoskeletal: Negative for back pain, neck pain and neck stiffness. Skin: Negative for color change, rash and wound. Neurological: Positive for light-headedness. Negative for dizziness, syncope and headaches. Psychiatric/Behavioral: Negative for agitation, behavioral problems and confusion. Physical Exam  Constitutional:       General: She is in acute distress. Appearance: Normal appearance. She is obese. She is not ill-appearing, toxic-appearing or diaphoretic. HENT:      Head: Normocephalic and atraumatic. Right Ear: External ear normal.      Left Ear: External ear normal.      Nose: Nose normal. No rhinorrhea. Mouth/Throat:      Pharynx: Oropharynx is clear. Eyes:      General: No scleral icterus. Right eye: No discharge. Left eye: No discharge. Extraocular Movements: Extraocular movements intact. Conjunctiva/sclera: Conjunctivae normal.      Pupils: Pupils are equal, round, and reactive to light. Cardiovascular:      Rate and Rhythm: Regular rhythm. Bradycardia present. Pulses: Normal pulses. Pulmonary:      Effort: Pulmonary effort is normal. No respiratory distress. Breath sounds: Normal breath sounds. No stridor. Abdominal:      General: Abdomen is flat. There is no distension. Palpations: Abdomen is soft. Tenderness: There is no abdominal tenderness. There is no guarding. Musculoskeletal:         General: No swelling or tenderness. Normal range of motion. Cervical back: Normal range of motion. Right lower leg: No edema. Left lower leg: No edema. Skin:     General: Skin is warm and dry. Capillary Refill: Capillary refill takes less than 2 seconds. Coloration: Skin is not jaundiced. Findings: No erythema or rash. Neurological:      General: No focal deficit present. Mental Status: She is alert and oriented to person, place, and time. Psychiatric:         Mood and Affect: Mood normal.         Behavior: Behavior normal.          Procedures     MDM  Number of Diagnoses or Management Options  Bradycardia  Chest pain, unspecified type  Diagnosis management comments: Mrs. Roderick Sandhoff is a 35 y/o F pt who presented to the ED with chest tightness and mild shortness of breath since last night however became worse this morning. She has history of drug abuse and is currently on methadone and clonidine. Pt did take her clonidine this morning, which she has been on for past year. She recently had change in her methadone dosage as well, unsure if increased or decreased however. On PE, pt is alert and oriented, in mild distress. Initial HR 43, /94, other vitals WNL. Resp nonlabored. Lungs C/E bilaterally. HR reji, regular. Radial pulses strong, symmetric. Skin warm, dry, normal coloration. Abd obese, soft, nondistended, nontender. Labwork, EKG, d-dimer, UPT, serum and urine drug screen were ordered. Electrophys cardiology was consulted and came to see patient and review EKGs. EKG showed sinus bradycardia at 33 with no ST segment changes (previous EKG last year was sinus reji at 51). L fluids ordered. Pt felt to be stable at this time, perfusing well with normal mentation. Labwork showed electrolytes balanced, Mg WNL, trop WNL, UPT negative, UA negative. Urine drug positive for methadone and cocaine. T4 mildly low at 4.3 and TSH WNL. D-dimer was 568, CTA pulm was ordered and negative for PE. From her initial HTN and bradycardia, bradycardia may be secondary clonidine and methadone usage, however she states she took her clonidine this morning as she normally has for past year. Pts HR decreased to 28, 0.5 mg atropine was given and pt had improvement of HR to 90, had 1 episode of vomiting, was given zofran. Dr. Luis Contreras, hospitalist, was contacted and will admit pt for further evaluation and care of bradycardia. Amount and/or Complexity of Data Reviewed  Decide to obtain previous medical records or to obtain history from someone other than the patient: yes       EKG Interpretation  Interpreted by emergency department physician. 5/2/22  Time: 1125    Rate: 33  Axis: normal  MO: 202  QRS: 82  Qtc: 402  Rhythm: regular  Clinical Impression: Sinus reji  Comparison to old EKG:  Was sinus reji at 51 bpm on 9/6/21      ED Course as of 05/03/22 1131   Mon May 02, 2022   1146 Consult placed for Cards Electrophysiology who is here now reviewing EKGs [PW]   1302 TSH: 1.340  WNL [PW]   1303 Comprehensive Metabolic Panel w/ Reflex to MG(!):    Sodium 139   Potassium 3.8   Chloride 103   CO2 24   Anion Gap 12   GLUCOSE, FASTING,(!)   BUN,BUNPL 10   Creatinine 0.9   GFR Non-African American >60 GFR  >60   CALCIUM, SERUM, 375113 9.8   Total Protein 8.1   Albumin 3.9   Bilirubin 0.3   Alk Phos 122(!)   ALT 47(!)   AST 51(!)  Mild transaminitis. Electrolytes balanced. Normal kidney fx [PW]   1303 Hemoglobin Quant(!): 11.4  Mild anemia. [PW]   2039 D-Dimer, Quant: 568  D-dimer elevated. CTA pulm ordered for eval for PE.  [PW]   1304 Troponin, High Sensitivity: <6  WNL. [PW]   6272 HR still 35. [PW]   3089 Pt brought warm blanket. Still having some chest pains. HR 33.  [PW]   1517 CTA PULMONARY W CONTRAST  No PE.  [PW]   1629 0.5 mg atropine given. Pt HR now 80, states chest pain is a bit improved. [PW]      ED Course User Index  [PW] Elizabeth Carbajal DO          --------------------------------------------- PAST HISTORY ---------------------------------------------  Past Medical History:  has a past medical history of Abnormal Pap smear of cervix, Anemia, Drug abuse ([address] Obesity, Overdose, and Preeclampsia complicating hypertension. Past Surgical History:  has no past surgical history on file. Social History:  reports that she has been smoking cigarettes. She has a 6.50 pack-year smoking history. She has never used smokeless tobacco. She reports previous drug use. Drug: Methamphetamines (Crystal Meth). She reports that she does not drink alcohol. Family History: family history is not on file. The patients home medications have been reviewed. Allergies: Patient has no known allergies.     -------------------------------------------------- RESULTS -------------------------------------------------    LABS:  Results for orders placed or performed during the hospital encounter of 05/02/22   CBC with Auto Differential   Result Value Ref Range    WBC 8.5 4.5 - 11.5 E9/L    RBC 3.59 3.50 - 5.50 E12/L    Hemoglobin 11.4 (L) 11.5 - 15.5 g/dL    Hematocrit 33.8 (L) 34.0 - 48.0 %    MCV 94.2 80.0 - 99.9 fL    MCH 31.8 26.0 - 35.0 pg    MCHC 33.7 32.0 - 34.5 %    RDW 13.2 11.5 - 15.0 fL    Platelets 057 866 - 799 E9/L    MPV 9.7 7.0 - 12.0 fL    Neutrophils % 65.4 43.0 - 80.0 %    Immature Granulocytes % 0.2 0.0 - 5.0 %    Lymphocytes % 24.1 20.0 - 42.0 %    Monocytes % 6.0 2.0 - 12.0 %    Eosinophils % 3.8 0.0 - 6.0 %    Basophils % 0.5 0.0 - 2.0 %    Neutrophils Absolute 5.57 1.80 - 7.30 E9/L    Immature Granulocytes # 0.02 E9/L    Lymphocytes Absolute 2.05 1.50 - 4.00 E9/L    Monocytes Absolute 0.51 0.10 - 0.95 E9/L    Eosinophils Absolute 0.32 0.05 - 0.50 E9/L    Basophils Absolute 0.04 0.00 - 0.20 E9/L   Comprehensive Metabolic Panel w/ Reflex to MG   Result Value Ref Range    Sodium 139 132 - 146 mmol/L    Potassium reflex Magnesium 3.8 3.5 - 5.0 mmol/L    Chloride 103 98 - 107 mmol/L    CO2 24 22 - 29 mmol/L    Anion Gap 12 7 - 16 mmol/L    Glucose 105 (H) 74 - 99 mg/dL    BUN 10 6 - 20 mg/dL    CREATININE 0.9 0.5 - 1.0 mg/dL    GFR Non-African American >60 >=60 mL/min/1.73    GFR African American >60     Calcium 9.8 8.6 - 10.2 mg/dL    Total Protein 8.1 6.4 - 8.3 g/dL    Albumin 3.9 3.5 - 5.2 g/dL    Total Bilirubin 0.3 0.0 - 1.2 mg/dL    Alkaline Phosphatase 122 (H) 35 - 104 U/L    ALT 47 (H) 0 - 32 U/L    AST 51 (H) 0 - 31 U/L   D-Dimer, Quantitative   Result Value Ref Range    D-Dimer, Quant 568 ng/mL DDU   Serum Drug Screen   Result Value Ref Range    Ethanol Lvl <10 mg/dL    Acetaminophen Level <5.0 (L) 10.0 - 62.6 mcg/mL    Salicylate, Serum <8.3 0.0 - 30.0 mg/dL    TCA Scrn NEGATIVE Cutoff:300 ng/mL   URINE DRUG SCREEN   Result Value Ref Range    Amphetamine Screen, Urine NOT DETECTED Negative <1000 ng/mL    Barbiturate Screen, Ur NOT DETECTED Negative < 200 ng/mL    Benzodiazepine Screen, Urine NOT DETECTED Negative < 200 ng/mL    Cannabinoid Scrn, Ur NOT DETECTED Negative < 50ng/mL    Cocaine Metabolite Screen, Urine POSITIVE (A) Negative < 300 ng/mL    Opiate Scrn, Ur NOT DETECTED Negative < 300ng/mL    PCP Screen, Urine NOT DETECTED Negative < 25 ng/mL    Methadone Screen, Urine POSITIVE (A) Negative <300 ng/mL    Oxycodone Urine NOT DETECTED Negative <100 ng/mL    FENTANYL SCREEN, URINE NOT DETECTED Negative <1 ng/mL    Drug Screen Comment: see below    Urinalysis   Result Value Ref Range    Color, UA Yellow Straw/Yellow    Clarity, UA Clear Clear    Glucose, Ur Negative Negative mg/dL    Bilirubin Urine Negative Negative    Ketones, Urine Negative Negative mg/dL    Specific Gravity, UA 1.025 1.005 - 1.030    Blood, Urine Negative Negative    pH, UA 6.0 5.0 - 9.0    Protein, UA Negative Negative mg/dL    Urobilinogen, Urine 0.2 <2.0 E.U./dL    Nitrite, Urine Negative Negative    Leukocyte Esterase, Urine Negative Negative   TSH   Result Value Ref Range    TSH 1.340 0.270 - 4.200 uIU/mL   T4   Result Value Ref Range    T4, Total 4.3 (L) 4.5 - 11.7 mcg/dL   Troponin   Result Value Ref Range    Troponin, High Sensitivity <6 0 - 9 ng/L   Pregnancy, urine   Result Value Ref Range    HCG(Urine) Pregnancy Test NEGATIVE NEGATIVE   Magnesium   Result Value Ref Range    Magnesium 1.9 1.6 - 2.6 mg/dL   CBC with Auto Differential   Result Value Ref Range    WBC 9.9 4.5 - 11.5 E9/L    RBC 3.66 3.50 - 5.50 E12/L    Hemoglobin 11.6 11.5 - 15.5 g/dL    Hematocrit 33.6 (L) 34.0 - 48.0 %    MCV 91.8 80.0 - 99.9 fL    MCH 31.7 26.0 - 35.0 pg    MCHC 34.5 32.0 - 34.5 %    RDW 13.0 11.5 - 15.0 fL    Platelets 386 643 - 828 E9/L    MPV 10.1 7.0 - 12.0 fL    Neutrophils % 71.2 43.0 - 80.0 %    Immature Granulocytes % 0.5 0.0 - 5.0 %    Lymphocytes % 20.3 20.0 - 42.0 %    Monocytes % 7.6 2.0 - 12.0 %    Eosinophils % 0.1 0.0 - 6.0 %    Basophils % 0.3 0.0 - 2.0 %    Neutrophils Absolute 7.07 1.80 - 7.30 E9/L    Immature Granulocytes # 0.05 E9/L    Lymphocytes Absolute 2.01 1.50 - 4.00 E9/L    Monocytes Absolute 0.75 0.10 - 0.95 E9/L    Eosinophils Absolute 0.01 (L) 0.05 - 0.50 E9/L    Basophils Absolute 0.03 0.00 - 0.20 I8/P   Basic Metabolic Panel   Result Value Ref Range    Sodium 138 132 - 146 mmol/L    Potassium 3.4 (L) 3.5 - 5.0 mmol/L    Chloride 103 98 - 107 mmol/L    CO2 23 22 - 29 mmol/L    Anion Gap 12 7 - 16 mmol/L    Glucose 89 74 - 99 mg/dL    BUN 10 6 - 20 mg/dL    CREATININE 0.8 0.5 - 1.0 mg/dL    GFR Non-African American >60 >=60 mL/min/1.73    GFR African American >60     Calcium 9.3 8.6 - 10.2 mg/dL   Magnesium   Result Value Ref Range    Magnesium 1.9 1.6 - 2.6 mg/dL   Lipid panel   Result Value Ref Range    Cholesterol, Total 119 0 - 199 mg/dL    Triglycerides 142 0 - 149 mg/dL    HDL 36 >40 mg/dL    LDL Calculated 55 0 - 99 mg/dL    VLDL Cholesterol Calculated 28 mg/dL   POC Pregnancy Urine Qual   Result Value Ref Range    HCG, Urine, POC Negative Negative    Lot Number [id number]     Positive QC Pass/Fail Pass     Negative QC Pass/Fail Pass    EKG 12 Lead   Result Value Ref Range    Ventricular Rate 33 BPM    Atrial Rate 33 BPM    P-R Interval 202 ms    QRS Duration 82 ms    Q-T Interval 544 ms    QTc Calculation (Bazett) 402 ms    P Axis 7 degrees    R Axis 61 degrees    T Axis 27 degrees   EKG 12 Lead   Result Value Ref Range    Ventricular Rate 36 BPM    Atrial Rate 36 BPM    P-R Interval 208 ms    QRS Duration 74 ms    Q-T Interval 540 ms    QTc Calculation (Bazett) 417 ms    P Axis 51 degrees    R Axis 64 degrees    T Axis 28 degrees   EKG 12 Lead   Result Value Ref Range    Ventricular Rate 46 BPM    Atrial Rate 46 BPM    P-R Interval 210 ms    QRS Duration 82 ms    Q-T Interval 510 ms    QTc Calculation (Bazett) 446 ms    P Axis 11 degrees    R Axis 54 degrees    T Axis 50 degrees   EKG 12 Lead   Result Value Ref Range    Ventricular Rate 52 BPM    Atrial Rate 51 BPM    QRS Duration 130 ms    Q-T Interval 490 ms    QTc Calculation (Bazett) 455 ms    R Axis 113 degrees       RADIOLOGY:  CTA PULMONARY W CONTRAST   Final Result   1. Minimal dependent atelectasis seen within the right lung base posteriorly   and trace right pleural effusion.    2. There is no pulmonary embolus ------------------------- NURSING NOTES AND VITALS REVIEWED ---------------------------  Date / Time Roomed:  5/2/2022 11:19 AM  ED Bed Assignment:  [id number]    The nursing notes within the ED encounter and vital signs as below have been reviewed.      Patient Vitals for the past 8 hrs:   BP Temp Temp src Pulse Resp SpO2   05/03/22 0745 118/67 98.6 °F (37 °C) -- (!) 49 16 100 %   05/03/22 0412 (!) 135/96 97.3 °F (36.3 °C) Temporal 50 18 98 %       Oxygen Saturation Interpretation: normal    ------------------------------------------ MEDS GIVEN ---------------------------------------------------  Medications   sodium chloride flush 0.9 % injection 10 mL (has no administration in time range)   cloNIDine (CATAPRES) tablet 0.2 mg ( Oral Automatically Held 5/8/22 2100)   QUEtiapine (SEROQUEL) tablet 100 mg ( Oral Unheld by provider 5/3/22 1022)   sodium chloride flush 0.9 % injection 5-40 mL (5 mLs IntraVENous Not Given 5/2/22 2320)   sodium chloride flush 0.9 % injection 5-40 mL (has no administration in time range)   0.9 % sodium chloride infusion (has no administration in time range)   polyethylene glycol (GLYCOLAX) packet 17 g (has no administration in time range)   acetaminophen (TYLENOL) tablet 650 mg (has no administration in time range)     Or   acetaminophen (TYLENOL) suppository 650 mg (has no administration in time range)   trimethobenzamide (TIGAN) injection 200 mg (has no administration in time range)   pantoprazole (PROTONIX) 40 mg in sodium chloride (PF) 10 mL injection (40 mg IntraVENous Given 5/3/22 1114)   hydrALAZINE (APRESOLINE) injection 10 mg (has no administration in time range)   methadone (DOLOPHINE) 10 MG/ML solution 155 mg (155 mg Oral Given 5/3/22 1054)   methadone (DOLOPHINE) 10 MG/ML solution 45 mg ( Oral Unheld by provider 5/3/22 1021)   nicotine (NICODERM CQ) 14 MG/24HR 1 patch (1 patch TransDERmal Patch Applied 5/3/22 1114)   0.9 % sodium chloride bolus (0 mLs IntraVENous Stopped 5/2/22 1321)   iopamidol (ISOVUE-370) 76 % injection 60 mL (60 mLs IntraVENous Given 5/2/22 1426)   atropine injection 0.5 mg (0.5 mg IntraVENous Given 5/2/22 1534)   ondansetron (ZOFRAN) injection 4 mg (4 mg IntraVENous Given 5/2/22 1619)   potassium bicarb-citric acid (EFFER-K) effervescent tablet 40 mEq (40 mEq Oral Given 5/3/22 1113)       ------------------------------------------ PROGRESS NOTES ------------------------------------------    Counseling:  I have spoken with the patient and discussed todays results, in addition to providing specific details for the plan of care and counseling regarding the diagnosis and prognosis. Their questions are answered at this time and they are agreeable with the plan of admission.    --------------------------------- ADDITIONAL PROVIDER NOTES ---------------------------------    Consultations:  Time: 1500 . Spoke with Dr. Shaunna Epstein. Discussed case. They will admit the patient. This patient's ED course included: history and physical, monitor, multiple bedside re-evaluations, IV medications, complex decision making and admission to hospital.    This patient has remained hemodynamically stable during their ED course. Diagnosis:  1. Bradycardia    2. Chest pain, unspecified type        Disposition:  Patient's disposition: Admit to in patient w/ tele  Patient's condition is fair. Zoya Mariee DO      *NOTE: This report was transcribed using voice recognition software. Every effort was made to ensure accuracy; however, inadvertent computerized transcription errors may be present.        Zoya Mariee DO  Resident  05/03/22 0861

## 2022-05-02 NOTE — ED NOTES
Pt given Atropine and became nauseated after HR in 90s.  Pt vomited on floor 1 time and having dry heaves     Michael Rm RN  05/02/22 5427

## 2022-05-02 NOTE — ED PROVIDER NOTES
ATTENDING PROVIDER ATTESTATION:     mC Cameron presented to the emergency department for evaluation of Chest Pain (STARTING LAST NIGHT, WORSE THIS AM)   and was initially evaluated by the Medical Resident. See Original ED Note for H&P and ED course above. I have reviewed and discussed the case, including pertinent history (medical, surgical, family and social) and exam findings with the Medical Resident assigned to Cm Cameron. I have personally performed and/or participated in the history, exam, medical decision making, and procedures and agree with all pertinent clinical information and any additional changes or corrections are noted below in my assessment and plan. I have discussed this patient in detail with the resident, and provided the instruction and education,       I have reviewed my findings and recommendations with the assigned Medical Resident, Cm Cameron and members of family present at the time of disposition. I have performed a history and physical examination of this patient and directly supervised the resident caring for this patient      History of Present Illness:    Presents to the ED for chest pain, beginning last night. The complaint has been constant, moderate in severity, and worsened by nothing. Patient reports not feeling well since last night. She reports pressure/tightness in her chest.  She reports it went on throughout the night and was worse this morning. No fevers or chills. No shortness of breath. Says she also feels lightheaded. EMS was called and they report her heart rate was in the 30s. She has no history of irregular heartbeat. She takes methadone and takes 155 mg in the morning and 45 mg at night. She reports recent increase in the methadone. She also reports she takes clonidine although there is been no change in this medication. No syncope. No abdominal pain. No back pain.   Denies a chance of pregnancy. Review of Systems:   A complete review of systems was performed and pertinent positives and negatives are stated within HPI, all other systems reviewed and are negative.    --------------------------------------------- PAST HISTORY ---------------------------------------------  Past Medical History:  has a past medical history of Abnormal Pap smear of cervix, Anemia, Drug abuse ([address] Obesity, Overdose, and Preeclampsia complicating hypertension. Past Surgical History:  has no past surgical history on file. Social History:  reports that she has been smoking cigarettes. She has a 6.50 pack-year smoking history. She has never used smokeless tobacco. She reports previous drug use. Drug: Methamphetamines (Crystal Meth). She reports that she does not drink alcohol. Family History: family history is not on file. Unless otherwise noted, family history is non contributory    The patients home medications have been reviewed. Allergies: Patient has no known allergies. EKG Interpretation  Interpreted by emergency department physician, Dr. Juan M Mejia     5/2/22  Time: 8042    Rhythm: sinus bradycardia  Rate: bradycardia  Axis: normal  Conduction: normal  ST Segments: no acute change  T Waves: no acute change    Clinical Impression: Sinus bradycardia, no acute ischemic changes  Comparison to Old EKG  Stable except more bradycardic       Physical Exam:  Constitutional/General: Alert and oriented x3  Head: Normocephalic and atraumatic  Eyes: PERRL, EOMI, sclera non icteric  ENT: Oropharynx clear, handling secretions  Neck: Supple, full ROM, no stridor, no meningeal signs  Respiratory: Lungs clear to auscultation bilaterally, no wheezes, rales, or rhonchi. Not in respiratory distress  Cardiovascular:  bradycardic but regular. Regular rhythm. No murmurs, no gallops, no rubs. 2+ distal pulses. Equal extremity pulses. GI:  Abdomen Soft, Non tender, Non distended. No rebound, guarding, or rigidity.  No pulsatile masses. Musculoskeletal: Moves all extremities x 4. Warm and well perfused,  no clubbing, no cyanosis, no edema. Palpable peripheral pulses  Integument: skin warm and dry. No rashes. Neurologic: GCS 15, no focal deficits  Psychiatric: Normal Affect      I directly supervised any procedures performed by the resident and was present for the procedure including all critical portions of the procedure      The cardiac monitor revealed sinus bradycardia with a heart rate in the 30s as interpreted by me. The cardiac monitor was ordered secondary to the patient's chest pain and to monitor the patient for dysrhythmia. CPT [id number]      I, Dr. oJyce Lebron, am the primary provider of record    My Medical Decision Making:         Chest pain, EKG without ST segment elevation, troponin negative, CTA negative for PE. She also has significant bradycardia. It appears to be sinus although on the monitor appears to have periods where there are nonconducted P waves. Stat consult was placed to EP, they evaluated, they were suspicious this may be medication related and recommended admission for observation. Medicine was consulted for admission. Initially there is consultation placed for ICU admission based on heart rate of 28 although blood pressure was stable. After this was arranged she was given 0.5 mg atropine to see if this made any improvement in her heart resolved and improved to the 80s. ICU admission was canceled. She was evaluated by house medicine in the ER, they evaluated and her okay for telemetry admission based on her improved heart rate in response to atropine. She needs admission for further evaluation and testing. CRITICAL CARE:  Please note that the withdrawal or failure to initiate urgent interventions for this patient would likely result in a life threatening deterioration or permanent disability.       Accordingly this patient received 30 minutes of critical care time, including coordination of care, and direct bedside care and excluding separately billable procedures. 1. Bradycardia    2.  Chest pain, unspecified type           Rubia Abraham MD  05/02/22 1640

## 2022-05-02 NOTE — ED NOTES
Pt resting with eyes closed, resp easy, skin warm and dry, no distress noted.      Casie Chun RN  05/02/22 2628

## 2022-05-03 LAB
ANION GAP SERPL CALCULATED.3IONS-SCNC: 12 MMOL/L (ref 7–16)
BASOPHILS ABSOLUTE: 0.03 E9/L (ref 0–0.2)
BASOPHILS RELATIVE PERCENT: 0.3 % (ref 0–2)
BUN BLDV-MCNC: 10 MG/DL (ref 6–20)
CALCIUM SERPL-MCNC: 9.3 MG/DL (ref 8.6–10.2)
CHLORIDE BLD-SCNC: 103 MMOL/L (ref 98–107)
CHOLESTEROL, TOTAL: 119 MG/DL (ref 0–199)
CO2: 23 MMOL/L (ref 22–29)
CREAT SERPL-MCNC: 0.8 MG/DL (ref 0.5–1)
EKG ATRIAL RATE: 46 BPM
EKG ATRIAL RATE: 51 BPM
EKG P AXIS: 11 DEGREES
EKG P-R INTERVAL: 210 MS
EKG Q-T INTERVAL: 490 MS
EKG Q-T INTERVAL: 510 MS
EKG QRS DURATION: 130 MS
EKG QRS DURATION: 82 MS
EKG QTC CALCULATION (BAZETT): 446 MS
EKG QTC CALCULATION (BAZETT): 455 MS
EKG R AXIS: 113 DEGREES
EKG R AXIS: 54 DEGREES
EKG T AXIS: 50 DEGREES
EKG VENTRICULAR RATE: 46 BPM
EKG VENTRICULAR RATE: 52 BPM
EOSINOPHILS ABSOLUTE: 0.01 E9/L (ref 0.05–0.5)
EOSINOPHILS RELATIVE PERCENT: 0.1 % (ref 0–6)
GFR AFRICAN AMERICAN: >60
GFR NON-AFRICAN AMERICAN: >60 ML/MIN/1.73
GLUCOSE BLD-MCNC: 89 MG/DL (ref 74–99)
HCT VFR BLD CALC: 33.6 % (ref 34–48)
HDLC SERPL-MCNC: 36 MG/DL
HEMOGLOBIN: 11.6 G/DL (ref 11.5–15.5)
IMMATURE GRANULOCYTES #: 0.05 E9/L
IMMATURE GRANULOCYTES %: 0.5 % (ref 0–5)
LDL CHOLESTEROL CALCULATED: 55 MG/DL (ref 0–99)
LYMPHOCYTES ABSOLUTE: 2.01 E9/L (ref 1.5–4)
LYMPHOCYTES RELATIVE PERCENT: 20.3 % (ref 20–42)
MAGNESIUM: 1.9 MG/DL (ref 1.6–2.6)
MCH RBC QN AUTO: 31.7 PG (ref 26–35)
MCHC RBC AUTO-ENTMCNC: 34.5 % (ref 32–34.5)
MCV RBC AUTO: 91.8 FL (ref 80–99.9)
MONOCYTES ABSOLUTE: 0.75 E9/L (ref 0.1–0.95)
MONOCYTES RELATIVE PERCENT: 7.6 % (ref 2–12)
NEUTROPHILS ABSOLUTE: 7.07 E9/L (ref 1.8–7.3)
NEUTROPHILS RELATIVE PERCENT: 71.2 % (ref 43–80)
PDW BLD-RTO: 13 FL (ref 11.5–15)
PLATELET # BLD: 318 E9/L (ref 130–450)
PMV BLD AUTO: 10.1 FL (ref 7–12)
POTASSIUM SERPL-SCNC: 3.4 MMOL/L (ref 3.5–5)
RBC # BLD: 3.66 E12/L (ref 3.5–5.5)
SODIUM BLD-SCNC: 138 MMOL/L (ref 132–146)
TRIGL SERPL-MCNC: 142 MG/DL (ref 0–149)
VLDLC SERPL CALC-MCNC: 28 MG/DL
WBC # BLD: 9.9 E9/L (ref 4.5–11.5)

## 2022-05-03 PROCEDURE — 93005 ELECTROCARDIOGRAM TRACING: CPT | Performed by: STUDENT IN AN ORGANIZED HEALTH CARE EDUCATION/TRAINING PROGRAM

## 2022-05-03 PROCEDURE — 85025 COMPLETE CBC W/AUTO DIFF WBC: CPT

## 2022-05-03 PROCEDURE — 6370000000 HC RX 637 (ALT 250 FOR IP): Performed by: STUDENT IN AN ORGANIZED HEALTH CARE EDUCATION/TRAINING PROGRAM

## 2022-05-03 PROCEDURE — 2140000000 HC CCU INTERMEDIATE R&B

## 2022-05-03 PROCEDURE — 36415 COLL VENOUS BLD VENIPUNCTURE: CPT

## 2022-05-03 PROCEDURE — C9113 INJ PANTOPRAZOLE SODIUM, VIA: HCPCS | Performed by: STUDENT IN AN ORGANIZED HEALTH CARE EDUCATION/TRAINING PROGRAM

## 2022-05-03 PROCEDURE — 6360000002 HC RX W HCPCS: Performed by: STUDENT IN AN ORGANIZED HEALTH CARE EDUCATION/TRAINING PROGRAM

## 2022-05-03 PROCEDURE — 99223 1ST HOSP IP/OBS HIGH 75: CPT | Performed by: INTERNAL MEDICINE

## 2022-05-03 PROCEDURE — 93010 ELECTROCARDIOGRAM REPORT: CPT | Performed by: INTERNAL MEDICINE

## 2022-05-03 PROCEDURE — 2580000003 HC RX 258: Performed by: STUDENT IN AN ORGANIZED HEALTH CARE EDUCATION/TRAINING PROGRAM

## 2022-05-03 PROCEDURE — 96372 THER/PROPH/DIAG INJ SC/IM: CPT

## 2022-05-03 PROCEDURE — 80048 BASIC METABOLIC PNL TOTAL CA: CPT

## 2022-05-03 PROCEDURE — A4216 STERILE WATER/SALINE, 10 ML: HCPCS | Performed by: STUDENT IN AN ORGANIZED HEALTH CARE EDUCATION/TRAINING PROGRAM

## 2022-05-03 PROCEDURE — 96375 TX/PRO/DX INJ NEW DRUG ADDON: CPT

## 2022-05-03 PROCEDURE — G0378 HOSPITAL OBSERVATION PER HR: HCPCS

## 2022-05-03 PROCEDURE — 83735 ASSAY OF MAGNESIUM: CPT

## 2022-05-03 RX ORDER — CLONIDINE HYDROCHLORIDE 0.2 MG/1
TABLET ORAL
Qty: 24 TABLET | Refills: 0 | Status: SHIPPED | OUTPATIENT
Start: 2022-05-10 | End: 2022-05-10 | Stop reason: HOSPADM

## 2022-05-03 RX ORDER — ACETAMINOPHEN 500 MG
500 TABLET ORAL 4 TIMES DAILY PRN
Qty: 120 TABLET | Refills: 0 | Status: SHIPPED | OUTPATIENT
Start: 2022-05-03 | End: 2022-06-10

## 2022-05-03 RX ORDER — LIDOCAINE 4 G/G
1 PATCH TOPICAL DAILY
Status: DISCONTINUED | OUTPATIENT
Start: 2022-05-03 | End: 2022-05-10 | Stop reason: HOSPADM

## 2022-05-03 RX ADMIN — PANTOPRAZOLE SODIUM 40 MG: 40 INJECTION, POWDER, FOR SOLUTION INTRAVENOUS at 11:14

## 2022-05-03 RX ADMIN — Medication 155 MG: at 10:54

## 2022-05-03 RX ADMIN — Medication 10 ML: at 20:17

## 2022-05-03 RX ADMIN — Medication 45 MG: at 20:14

## 2022-05-03 RX ADMIN — POTASSIUM BICARBONATE 40 MEQ: 782 TABLET, EFFERVESCENT ORAL at 11:13

## 2022-05-03 RX ADMIN — TRIMETHOBENZAMIDE HYDROCHLORIDE 200 MG: 100 INJECTION INTRAMUSCULAR at 15:57

## 2022-05-03 RX ADMIN — QUETIAPINE FUMARATE 100 MG: 100 TABLET ORAL at 20:14

## 2022-05-03 ASSESSMENT — PAIN DESCRIPTION - LOCATION: LOCATION: GENERALIZED

## 2022-05-03 ASSESSMENT — PAIN SCALES - GENERAL
PAINLEVEL_OUTOF10: 10
PAINLEVEL_OUTOF10: 3
PAINLEVEL_OUTOF10: 8
PAINLEVEL_OUTOF10: 0

## 2022-05-03 ASSESSMENT — PAIN DESCRIPTION - DESCRIPTORS: DESCRIPTORS: ACHING

## 2022-05-03 NOTE — PROGRESS NOTES
INFORMED PATIENT , PHYSICIAN WOULD LIKE HER TO EAT SOMETHING , SHE HAS REFUSED AT THE SENT TIME, SHE ALSO REFUSING LIDOCAINE PATCH

## 2022-05-03 NOTE — PROGRESS NOTES
[facility]  Internal Medicine Residency Program  Progress Note - House Team     Patient:  Noble Villalpando 36 y.o. female MRN: [id number]     Date of Service: 5/3/2022     CC: low heart rate, chest pain  Overnight events: Adult responded to atropine in ER. Subjective     Patient was seen and examined this morning at bedside in no acute distress. Overnight patient remained stable. Patient did not complain of any chest pain overnight. Patient denies any headache, blurry vision, chest pain, nausea, vomiting, bowel or bladder complaints overnight. Objective     Physical Exam:  Vitals: BP (!) 140/84   Pulse 50   Temp 96.8 °F (36 °C)   Resp 14   Ht 5' 4\" (1.626 m)   Wt 287 lb 3.2 oz (130.3 kg)   SpO2 97%   BMI 49.30 kg/m²     I & O - 24hr: No intake/output data recorded. General Appearance: alert, appears stated age and cooperative  HEENT:  Head: Normocephalic, no lesions, without obvious abnormality. Neck: no adenopathy, no carotid bruit, no JVD, supple, symmetrical, trachea midline and thyroid not enlarged, symmetric, no tenderness/mass/nodules  Lung: clear to auscultation bilaterally  Heart: S1, S2 normal, HR 50-60   Abdomen: soft, non-tender; bowel sounds normal; no masses,  no organomegaly  Extremities:  extremities normal, atraumatic, no cyanosis or edema  Musculokeletal: No joint swelling, no muscle tenderness. ROM normal in all joints of extremities.    Neurologic: Mental status: Alert, oriented, thought content appropriate  Subject  Pertinent Labs & Imaging Studies   dean  CBC with Differential:    Lab Results   Component Value Date    WBC 9.9 05/03/2022    RBC 3.66 05/03/2022    HGB 11.6 05/03/2022    HCT 33.6 05/03/2022     05/03/2022    MCV 91.8 05/03/2022    MCH 31.7 05/03/2022    MCHC 34.5 05/03/2022    RDW 13.0 05/03/2022    LYMPHOPCT 20.3 05/03/2022    MONOPCT 7.6 05/03/2022    BASOPCT 0.3 05/03/2022    MONOSABS 0.75 05/03/2022    LYMPHSABS 2.01 05/03/2022 EOSABS 0.01 05/03/2022    BASOSABS 0.03 05/03/2022     CMP:    Lab Results   Component Value Date     05/03/2022    K 3.4 05/03/2022    K 3.8 05/02/2022     05/03/2022    CO2 23 05/03/2022    BUN 10 05/03/2022    CREATININE 0.8 05/03/2022    GFRAA >60 05/03/2022    LABGLOM >60 05/03/2022    GLUCOSE 89 05/03/2022    PROT 8.1 05/02/2022    LABALBU 3.9 05/02/2022    CALCIUM 9.3 05/03/2022    BILITOT 0.3 05/02/2022    ALKPHOS 122 05/02/2022    AST 51 05/02/2022    ALT 47 05/02/2022     Magnesium:    Lab Results   Component Value Date    MG 1.9 05/03/2022     Phosphorus:  No results found for: PHOS  PT/INR:    Lab Results   Component Value Date    PROTIME 12.9 11/23/2019    INR 1.1 11/23/2019     Troponin:    Lab Results   Component Value Date    TROPONINI <0.01 04/23/2021     U/A:    Lab Results   Component Value Date    COLORU Yellow 05/02/2022    PROTEINU Negative 05/02/2022    PHUR 6.0 05/02/2022    WBCUA 0-1 12/17/2021    RBCUA 0-1 12/17/2021    RBCUA >20 09/17/2012    MUCUS Present 09/15/2016    BACTERIA FEW 12/17/2021    CLARITYU Clear 05/02/2022    SPECGRAV 1.025 05/02/2022    LEUKOCYTESUR Negative 05/02/2022    UROBILINOGEN 0.2 05/02/2022    BILIRUBINUR Negative 05/02/2022    BLOODU Negative 05/02/2022    GLUCOSEU Negative 05/02/2022     HgBA1c:  No results found for: LABA1C  TSH:    Lab Results   Component Value Date    TSH 1.340 05/02/2022     FOLATE:  No results found for: FOLATE  IRON:  No results found for: IRON  FERRITIN:  No results found for: FERRITIN    CTA PULMONARY W CONTRAST   Final Result   1. Minimal dependent atelectasis seen within the right lung base posteriorly   and trace right pleural effusion. 2. There is no pulmonary embolus              Resident's Assessment and Plan     Assessment and Plan:    Sinus bradycardia 2/2 multiple AV yennifer blocking agents (methadone, clonidine)  Noncardiac chest pain 2/2 ?   Gastritis  Hematemesis 2/2 gastritis/ ? ulcer  Hypertensive urgency 2/2 ? cocaine use  Polysubstance abuse-UDS positive for cocaine/methadone(ongoing treatment)  Mood disorder-on Seroquel     Plan:  Resume methadone/clonidine  Do not use beta-blocker 2/2 bradycardia  Injection Protonix 40 mg IV daily   Resume diet  Monitor blood pressure, hydralazine as needed ordered   Follow FOBT   Injection Tigan 200 mg IM as needed for nausea   Monitor heart rate  Monitor for hemoptysis  Hold off on chemical VTE prophylaxis in setting of hemoptysis      PT/OT evaluation:   DVT prophylaxis/ GI prophylaxis: PCDs/ protonix  Disposition: continue current care    Laura Curran MD, PGY-1  Attending physician: Dr. Eric Leroy

## 2022-05-03 NOTE — CARE COORDINATION
5/3, Consult for SW acknowledged. SW met with patient at bedside. Introduced self to patient was resting.   Patient requested that SW come back at a later time since she was not up to talking with SW.     Karolee Kayser, [facility] Case Management  [phone]

## 2022-05-04 ENCOUNTER — APPOINTMENT (OUTPATIENT)
Dept: GENERAL RADIOLOGY | Age: 32
DRG: 201 | End: 2022-05-04
Payer: MEDICAID

## 2022-05-04 PROBLEM — R11.10 VOMITING: Status: ACTIVE | Noted: 2022-05-04

## 2022-05-04 LAB
ANION GAP SERPL CALCULATED.3IONS-SCNC: 11 MMOL/L (ref 7–16)
BACTERIA: ABNORMAL /HPF
BASOPHILS ABSOLUTE: 0.04 E9/L (ref 0–0.2)
BASOPHILS RELATIVE PERCENT: 0.4 % (ref 0–2)
BILIRUBIN URINE: NEGATIVE
BLOOD, URINE: ABNORMAL
BUN BLDV-MCNC: 12 MG/DL (ref 6–20)
CALCIUM SERPL-MCNC: 8.8 MG/DL (ref 8.6–10.2)
CHLORIDE BLD-SCNC: 102 MMOL/L (ref 98–107)
CLARITY: CLEAR
CO2: 25 MMOL/L (ref 22–29)
COLOR: YELLOW
CREAT SERPL-MCNC: 0.9 MG/DL (ref 0.5–1)
EOSINOPHILS ABSOLUTE: 0.05 E9/L (ref 0.05–0.5)
EOSINOPHILS RELATIVE PERCENT: 0.5 % (ref 0–6)
EPITHELIAL CELLS, UA: ABNORMAL /HPF
GFR AFRICAN AMERICAN: >60
GFR NON-AFRICAN AMERICAN: >60 ML/MIN/1.73
GLUCOSE BLD-MCNC: 63 MG/DL (ref 74–99)
GLUCOSE URINE: NEGATIVE MG/DL
HCT VFR BLD CALC: 33 % (ref 34–48)
HEMOGLOBIN: 11.4 G/DL (ref 11.5–15.5)
IMMATURE GRANULOCYTES #: 0.05 E9/L
IMMATURE GRANULOCYTES %: 0.5 % (ref 0–5)
KETONES, URINE: NEGATIVE MG/DL
LEUKOCYTE ESTERASE, URINE: ABNORMAL
LIPASE: 15 U/L (ref 13–60)
LYMPHOCYTES ABSOLUTE: 2.71 E9/L (ref 1.5–4)
LYMPHOCYTES RELATIVE PERCENT: 26.8 % (ref 20–42)
MAGNESIUM: 2 MG/DL (ref 1.6–2.6)
MCH RBC QN AUTO: 31.8 PG (ref 26–35)
MCHC RBC AUTO-ENTMCNC: 34.5 % (ref 32–34.5)
MCV RBC AUTO: 92.2 FL (ref 80–99.9)
MONOCYTES ABSOLUTE: 0.9 E9/L (ref 0.1–0.95)
MONOCYTES RELATIVE PERCENT: 8.9 % (ref 2–12)
NEUTROPHILS ABSOLUTE: 6.37 E9/L (ref 1.8–7.3)
NEUTROPHILS RELATIVE PERCENT: 62.9 % (ref 43–80)
NITRITE, URINE: NEGATIVE
PDW BLD-RTO: 13.1 FL (ref 11.5–15)
PH UA: 6 (ref 5–9)
PLATELET # BLD: 298 E9/L (ref 130–450)
PMV BLD AUTO: 10 FL (ref 7–12)
POTASSIUM SERPL-SCNC: 3.1 MMOL/L (ref 3.5–5)
PROTEIN UA: NEGATIVE MG/DL
RBC # BLD: 3.58 E12/L (ref 3.5–5.5)
RBC UA: ABNORMAL /HPF (ref 0–2)
SODIUM BLD-SCNC: 138 MMOL/L (ref 132–146)
SPECIFIC GRAVITY UA: 1.02 (ref 1–1.03)
UROBILINOGEN, URINE: 1 E.U./DL
WBC # BLD: 10.1 E9/L (ref 4.5–11.5)
WBC UA: ABNORMAL /HPF (ref 0–5)

## 2022-05-04 PROCEDURE — 36415 COLL VENOUS BLD VENIPUNCTURE: CPT

## 2022-05-04 PROCEDURE — 6360000002 HC RX W HCPCS: Performed by: STUDENT IN AN ORGANIZED HEALTH CARE EDUCATION/TRAINING PROGRAM

## 2022-05-04 PROCEDURE — C9113 INJ PANTOPRAZOLE SODIUM, VIA: HCPCS | Performed by: STUDENT IN AN ORGANIZED HEALTH CARE EDUCATION/TRAINING PROGRAM

## 2022-05-04 PROCEDURE — 87186 SC STD MICRODIL/AGAR DIL: CPT

## 2022-05-04 PROCEDURE — 85025 COMPLETE CBC W/AUTO DIFF WBC: CPT

## 2022-05-04 PROCEDURE — 74019 RADEX ABDOMEN 2 VIEWS: CPT

## 2022-05-04 PROCEDURE — 96376 TX/PRO/DX INJ SAME DRUG ADON: CPT

## 2022-05-04 PROCEDURE — 80048 BASIC METABOLIC PNL TOTAL CA: CPT

## 2022-05-04 PROCEDURE — 96375 TX/PRO/DX INJ NEW DRUG ADDON: CPT

## 2022-05-04 PROCEDURE — 2140000000 HC CCU INTERMEDIATE R&B

## 2022-05-04 PROCEDURE — 87088 URINE BACTERIA CULTURE: CPT

## 2022-05-04 PROCEDURE — 6370000000 HC RX 637 (ALT 250 FOR IP): Performed by: INTERNAL MEDICINE

## 2022-05-04 PROCEDURE — 99233 SBSQ HOSP IP/OBS HIGH 50: CPT | Performed by: INTERNAL MEDICINE

## 2022-05-04 PROCEDURE — 83735 ASSAY OF MAGNESIUM: CPT

## 2022-05-04 PROCEDURE — 2580000003 HC RX 258: Performed by: STUDENT IN AN ORGANIZED HEALTH CARE EDUCATION/TRAINING PROGRAM

## 2022-05-04 PROCEDURE — 6370000000 HC RX 637 (ALT 250 FOR IP): Performed by: STUDENT IN AN ORGANIZED HEALTH CARE EDUCATION/TRAINING PROGRAM

## 2022-05-04 PROCEDURE — 81001 URINALYSIS AUTO W/SCOPE: CPT

## 2022-05-04 PROCEDURE — A4216 STERILE WATER/SALINE, 10 ML: HCPCS | Performed by: STUDENT IN AN ORGANIZED HEALTH CARE EDUCATION/TRAINING PROGRAM

## 2022-05-04 PROCEDURE — 83690 ASSAY OF LIPASE: CPT

## 2022-05-04 RX ORDER — POLYETHYLENE GLYCOL 3350 17 G/17G
17 POWDER, FOR SOLUTION ORAL DAILY
Status: DISCONTINUED | OUTPATIENT
Start: 2022-05-04 | End: 2022-05-10

## 2022-05-04 RX ORDER — POTASSIUM CHLORIDE 20 MEQ/1
40 TABLET, EXTENDED RELEASE ORAL ONCE
Status: COMPLETED | OUTPATIENT
Start: 2022-05-04 | End: 2022-05-04

## 2022-05-04 RX ORDER — POTASSIUM CHLORIDE 7.45 MG/ML
10 INJECTION INTRAVENOUS ONCE
Status: COMPLETED | OUTPATIENT
Start: 2022-05-04 | End: 2022-05-04

## 2022-05-04 RX ORDER — POTASSIUM CHLORIDE 7.45 MG/ML
10 INJECTION INTRAVENOUS
Status: DISPENSED | OUTPATIENT
Start: 2022-05-04 | End: 2022-05-04

## 2022-05-04 RX ORDER — 0.9 % SODIUM CHLORIDE 0.9 %
2000 INTRAVENOUS SOLUTION INTRAVENOUS ONCE
Status: COMPLETED | OUTPATIENT
Start: 2022-05-04 | End: 2022-05-04

## 2022-05-04 RX ORDER — SENNA PLUS 8.6 MG/1
2 TABLET ORAL 2 TIMES DAILY
Status: DISCONTINUED | OUTPATIENT
Start: 2022-05-04 | End: 2022-05-10 | Stop reason: HOSPADM

## 2022-05-04 RX ORDER — POTASSIUM CHLORIDE 20 MEQ/1
40 TABLET, EXTENDED RELEASE ORAL 2 TIMES DAILY WITH MEALS
Status: DISCONTINUED | OUTPATIENT
Start: 2022-05-04 | End: 2022-05-04

## 2022-05-04 RX ORDER — DICYCLOMINE HYDROCHLORIDE 10 MG/1
10 CAPSULE ORAL 3 TIMES DAILY PRN
Status: DISCONTINUED | OUTPATIENT
Start: 2022-05-04 | End: 2022-05-06

## 2022-05-04 RX ORDER — ENOXAPARIN SODIUM 100 MG/ML
30 INJECTION SUBCUTANEOUS 2 TIMES DAILY
Status: DISCONTINUED | OUTPATIENT
Start: 2022-05-04 | End: 2022-05-10 | Stop reason: HOSPADM

## 2022-05-04 RX ORDER — DEXTROSE MONOHYDRATE 25 G/50ML
12.5 INJECTION, SOLUTION INTRAVENOUS PRN
Status: DISCONTINUED | OUTPATIENT
Start: 2022-05-04 | End: 2022-05-10 | Stop reason: HOSPADM

## 2022-05-04 RX ORDER — DEXTROSE MONOHYDRATE 50 MG/ML
100 INJECTION, SOLUTION INTRAVENOUS PRN
Status: DISCONTINUED | OUTPATIENT
Start: 2022-05-04 | End: 2022-05-10 | Stop reason: HOSPADM

## 2022-05-04 RX ORDER — PANTOPRAZOLE SODIUM 40 MG/1
40 TABLET, DELAYED RELEASE ORAL
Status: DISCONTINUED | OUTPATIENT
Start: 2022-05-05 | End: 2022-05-10 | Stop reason: HOSPADM

## 2022-05-04 RX ORDER — ONDANSETRON 2 MG/ML
4 INJECTION INTRAMUSCULAR; INTRAVENOUS EVERY 6 HOURS PRN
Status: DISCONTINUED | OUTPATIENT
Start: 2022-05-04 | End: 2022-05-10 | Stop reason: HOSPADM

## 2022-05-04 RX ORDER — SENNA PLUS 8.6 MG/1
2 TABLET ORAL NIGHTLY
Status: DISCONTINUED | OUTPATIENT
Start: 2022-05-04 | End: 2022-05-04

## 2022-05-04 RX ADMIN — POTASSIUM CHLORIDE 10 MEQ: 7.46 INJECTION, SOLUTION INTRAVENOUS at 15:52

## 2022-05-04 RX ADMIN — QUETIAPINE FUMARATE 100 MG: 100 TABLET ORAL at 09:07

## 2022-05-04 RX ADMIN — POTASSIUM CHLORIDE 40 MEQ: 20 TABLET, EXTENDED RELEASE ORAL at 12:25

## 2022-05-04 RX ADMIN — Medication 10 ML: at 09:05

## 2022-05-04 RX ADMIN — ENOXAPARIN SODIUM 30 MG: 100 INJECTION SUBCUTANEOUS at 15:14

## 2022-05-04 RX ADMIN — PANTOPRAZOLE SODIUM 40 MG: 40 INJECTION, POWDER, FOR SOLUTION INTRAVENOUS at 09:06

## 2022-05-04 RX ADMIN — SENNOSIDES 17.2 MG: 8.6 TABLET, COATED ORAL at 22:05

## 2022-05-04 RX ADMIN — Medication 10 ML: at 22:04

## 2022-05-04 RX ADMIN — QUETIAPINE FUMARATE 100 MG: 100 TABLET ORAL at 22:05

## 2022-05-04 RX ADMIN — SODIUM CHLORIDE 2000 ML: 9 INJECTION, SOLUTION INTRAVENOUS at 12:32

## 2022-05-04 RX ADMIN — Medication 155 MG: at 09:02

## 2022-05-04 RX ADMIN — POTASSIUM CHLORIDE 40 MEQ: 20 TABLET, EXTENDED RELEASE ORAL at 09:21

## 2022-05-04 RX ADMIN — ENOXAPARIN SODIUM 30 MG: 100 INJECTION SUBCUTANEOUS at 22:04

## 2022-05-04 RX ADMIN — SODIUM CHLORIDE 2000 ML: 9 INJECTION, SOLUTION INTRAVENOUS at 15:53

## 2022-05-04 RX ADMIN — Medication 45 MG: at 19:05

## 2022-05-04 RX ADMIN — POTASSIUM CHLORIDE 10 MEQ: 7.46 INJECTION, SOLUTION INTRAVENOUS at 12:39

## 2022-05-04 RX ADMIN — SENNOSIDES 17.2 MG: 8.6 TABLET, COATED ORAL at 12:25

## 2022-05-04 RX ADMIN — POLYETHYLENE GLYCOL 3350 17 G: 17 POWDER, FOR SOLUTION ORAL at 12:25

## 2022-05-04 ASSESSMENT — PAIN SCALES - GENERAL
PAINLEVEL_OUTOF10: 0
PAINLEVEL_OUTOF10: 10

## 2022-05-04 ASSESSMENT — PAIN DESCRIPTION - DESCRIPTORS: DESCRIPTORS: ACHING;CRAMPING;DISCOMFORT

## 2022-05-04 ASSESSMENT — PAIN DESCRIPTION - LOCATION: LOCATION: ABDOMEN

## 2022-05-04 ASSESSMENT — PAIN - FUNCTIONAL ASSESSMENT: PAIN_FUNCTIONAL_ASSESSMENT: PREVENTS OR INTERFERES SOME ACTIVE ACTIVITIES AND ADLS

## 2022-05-04 NOTE — CARE COORDINATION
Peer Support met with pt but pt declined to speak at this time. Peer will follow up tomorrow, contact information was provided.

## 2022-05-04 NOTE — PROGRESS NOTES
[facility]  Internal Medicine Residency Program  Progress Note - House Team     Patient:  Samson Way 36 y.o. female MRN: [id number]     Date of Service: 5/4/2022     CC: low heart rate, chest pain  Overnight events: Patient had nausea, vomiting. Patient is not able to keep anything down. Subjective     Patient was seen and examined this morning at bedside in no acute distress. Overnight, patient continued to have abdominal pain, localized to epigastric area, associated with nausea and vomiting. Patient is unable to eat or keep anything down. Patient did pass small amount of stool and has been passing flatus. Abdominal x-ray ordered. Patient denies any chest pain, headache, blurry vision, bladder complaints. Objective     Physical Exam:  Vitals: /85   Pulse 60   Temp 97.6 °F (36.4 °C)   Resp 18   Ht 5' 4\" (1.626 m)   Wt 287 lb 3.2 oz (130.3 kg)   SpO2 99%   BMI 49.30 kg/m²     I & O - 24hr: No intake/output data recorded. General Appearance: alert, appears stated age and cooperative, in mild distress due to pain  HEENT:  Head: Normocephalic, no lesions, without obvious abnormality. Neck: no adenopathy, no carotid bruit, no JVD, supple, symmetrical, trachea midline and thyroid not enlarged, symmetric, no tenderness/mass/nodules  Lung: clear to auscultation bilaterally  Heart: S1, S2 normal, HR 50-60   Abdomen: Soft, tenderness in epigastric area, no hepatosplenomegaly, bowel sounds sluggish  Extremities:  extremities normal, atraumatic, no cyanosis or edema  Musculokeletal: No joint swelling, no muscle tenderness. ROM normal in all joints of extremities.    Neurologic: Mental status: Alert, oriented, thought content appropriate  Subject  Pertinent Labs & Imaging Studies   dean  CBC with Differential:    Lab Results   Component Value Date    WBC 10.1 05/04/2022    RBC 3.58 05/04/2022    HGB 11.4 05/04/2022    HCT 33.0 05/04/2022     05/04/2022    MCV 92.2 05/04/2022    MCH 31.8 05/04/2022    MCHC 34.5 05/04/2022    RDW 13.1 05/04/2022    LYMPHOPCT 26.8 05/04/2022    MONOPCT 8.9 05/04/2022    BASOPCT 0.4 05/04/2022    MONOSABS 0.90 05/04/2022    LYMPHSABS 2.71 05/04/2022    EOSABS 0.05 05/04/2022    BASOSABS 0.04 05/04/2022     CMP:    Lab Results   Component Value Date     05/04/2022    K 3.1 05/04/2022    K 3.8 05/02/2022     05/04/2022    CO2 25 05/04/2022    BUN 12 05/04/2022    CREATININE 0.9 05/04/2022    GFRAA >60 05/04/2022    LABGLOM >60 05/04/2022    GLUCOSE 63 05/04/2022    PROT 8.1 05/02/2022    LABALBU 3.9 05/02/2022    CALCIUM 8.8 05/04/2022    BILITOT 0.3 05/02/2022    ALKPHOS 122 05/02/2022    AST 51 05/02/2022    ALT 47 05/02/2022     Magnesium:    Lab Results   Component Value Date    MG 2.0 05/04/2022     Phosphorus:  No results found for: PHOS  PT/INR:    Lab Results   Component Value Date    PROTIME 12.9 11/23/2019    INR 1.1 11/23/2019     Troponin:    Lab Results   Component Value Date    TROPONINI <0.01 04/23/2021     U/A:    Lab Results   Component Value Date    COLORU Yellow 05/02/2022    PROTEINU Negative 05/02/2022    PHUR 6.0 05/02/2022    WBCUA 0-1 12/17/2021    RBCUA 0-1 12/17/2021    RBCUA >20 09/17/2012    MUCUS Present 09/15/2016    BACTERIA FEW 12/17/2021    CLARITYU Clear 05/02/2022    SPECGRAV 1.025 05/02/2022    LEUKOCYTESUR Negative 05/02/2022    UROBILINOGEN 0.2 05/02/2022    BILIRUBINUR Negative 05/02/2022    BLOODU Negative 05/02/2022    GLUCOSEU Negative 05/02/2022     HgBA1c:  No results found for: LABA1C  TSH:    Lab Results   Component Value Date    TSH 1.340 05/02/2022     FOLATE:  No results found for: FOLATE  IRON:  No results found for: IRON  FERRITIN:  No results found for: FERRITIN    XR ABDOMEN (2 VIEWS)   Final Result   Unremarkable bowel gas pattern, with no evidence of obstruction or free air. Moderate fecal retention. CTA PULMONARY W CONTRAST   Final Result   1.  Minimal dependent atelectasis seen within the right lung base posteriorly   and trace right pleural effusion. 2. There is no pulmonary embolus              Resident's Assessment and Plan     Assessment and Plan:    Sinus bradycardia 2/2 multiple AV yennifer blocking agents (methadone, clonidine)  Epigastric pain 2/2 ? Gastritis  No hematemesis since admission, associated with nausea and vomiting. Hypertensive urgency 2/2 ? cocaine use  Polysubstance abuse-UDS positive for cocaine/methadone(ongoing treatment)  Mood disorder-on Seroquel     Plan:  Continue methadone  Continue clonidine taper  Get Abdominal Xray  Re[place potassium  Do not use beta-blocker 2/2 bradycardia  Injection Protonix 40 mg IV daily   Encourage oral intake  Monitor blood pressure, hydralazine as needed ordered   Follow FOBT   Injection Tigan 200 mg IM as needed for nausea   Monitor heart rate  Monitor for hemoptysis  Hold off on chemical VTE prophylaxis in setting of hemoptysis  PT/OT evaluation:   DVT prophylaxis/ GI prophylaxis: PCDs/ protonix  Disposition: continue current care    Laureano Ruth MD, PGY-1  Attending physician: Dr. Michael Huffman    Attending Physician Statement:  Bess Garcia M.D., F.A.C.P.     I have discussed the case, including pertinent history and exam findings with the resident/NP. I have seen and examined the patient and the key elements of the encounter have been performed by me.   I agree with the resident ROS, PMHx, PSHx, meds reviewed and assessment, plan and orders as documented by the resident/NP    [facility] charts reviewed, including other providers notes, relevant labs and imaging.      Vomiting x1 prior to admission- doubt vagal     Sinus reji-- most clonidine   Possible clonidine overdose  (OD hx)  42 pillls every 10 days == 4.2 tabs daily ave,maybe more  Clonidine and methadone held- on recommendation of EP  Very rare --seroquel  Occasional  Aguilar Maldonado-- methadone     She is very adamant that she is PUT BACK ON HER METHADONE  -- noted to be on a very large dose. methadone     Also chronic cocaine can damage cardiac nerve fibers and cause sinus reji and blocks  (hx of active polysubstance +cocaine)  tsh N, ?borderline low T4-- reassess outpatient, hypothyroid vs variable  EP not concerned about ekg findings, recommending observing overnight-- pacer and atropine deferred     Sp dec clonidine 0.2 bid   (ave 4.2 tabs daily )-- then recommend slow wean and dec clonidine to 0.1 bid in future (+benadryl OTC if needs)-- if she agrees. Now HR 50s up to 100 asymptomatic  NOTED inc ambulation if HR inc- doubt significant chronotropic insufficiency    AST>alt -- alcoholic hepatitis? +Cocaine  -- NOT rhabo-- urine NO blood    The issue keeping her in hospital -- acute GI issues over last 2-3 days (seems recurrent)  abd pain, vomiting-- 2 to possible constipation   Plan IV fluids 2 liters    Mg OK, acutely dropped K+  K+ replete-- vomting-- dropped bc dry, and body's compensatory hyperaldo state  Dec PO intake  Labs, check pancreatitis  Pregnancy test neg  No fever,doubt viral gastroenteritis  Doubt opiate withdrawal.      abd xray +stool  CTA noted--Upper Abdomen: Limited images of the upper abdomen are unremarkable  Personally looked at CT -- noted full of stool    Very likely opiod inducing constipation--  Noted ER encounter for same in DEC--  Dc on colace/mag citrate  - discussed stimulant and osmotic on DC     DC or she might leave AMA  >50% of time spent coordinating care with other providers and/or counseling patient/family  Remainder of medical problems as per resident note.

## 2022-05-04 NOTE — CARE COORDINATION
5/4, SW spoke with patient. Discussed discharge planning. Patient lives with her 3year old daughter in a 1 story home with a ramp to enter the home. Patient uses the ramp but does not have a WC. Patient is independent. Patient does not drive. DME owned is none and Pharmacy is [facility]. Patient's mother and brother are watching the daughter of patient. PCP is primary care through [facility]. Discussed patient being on methadone through [facility]. where patient see a counselor for addition issues. Patient says she has been clean since 2000 from fentanyl. Referral made to Peer recovery who will go see patient in room. Patient says she is in the mom's program through [facility] and says they may provide transportation once discharged. Patient will contact them. If they are not available contact [facility] at [phone] (which is now [facility]). No other needs identified at this time. GUILLERMO/LEELA to follow for further discharge planning needs.     CHUCKY Amado  [facility] Case Management  [phone]

## 2022-05-05 PROBLEM — R10.9 ABDOMINAL PAIN: Status: ACTIVE | Noted: 2022-05-05

## 2022-05-05 LAB
AMYLASE: 68 U/L (ref 20–100)
ANION GAP SERPL CALCULATED.3IONS-SCNC: 11 MMOL/L (ref 7–16)
BASOPHILS ABSOLUTE: 0.04 E9/L (ref 0–0.2)
BASOPHILS RELATIVE PERCENT: 0.5 % (ref 0–2)
BUN BLDV-MCNC: 6 MG/DL (ref 6–20)
CALCIUM SERPL-MCNC: 9 MG/DL (ref 8.6–10.2)
CHLORIDE BLD-SCNC: 106 MMOL/L (ref 98–107)
CO2: 25 MMOL/L (ref 22–29)
CREAT SERPL-MCNC: 1 MG/DL (ref 0.5–1)
EOSINOPHILS ABSOLUTE: 0.17 E9/L (ref 0.05–0.5)
EOSINOPHILS RELATIVE PERCENT: 2 % (ref 0–6)
GFR AFRICAN AMERICAN: >60
GFR NON-AFRICAN AMERICAN: >60 ML/MIN/1.73
GLUCOSE BLD-MCNC: 69 MG/DL (ref 74–99)
HCT VFR BLD CALC: 35.9 % (ref 34–48)
HEMOGLOBIN: 12.3 G/DL (ref 11.5–15.5)
IMMATURE GRANULOCYTES #: 0.03 E9/L
IMMATURE GRANULOCYTES %: 0.3 % (ref 0–5)
LIPASE: 14 U/L (ref 13–60)
LYMPHOCYTES ABSOLUTE: 2.99 E9/L (ref 1.5–4)
LYMPHOCYTES RELATIVE PERCENT: 34.8 % (ref 20–42)
MAGNESIUM: 2 MG/DL (ref 1.6–2.6)
MCH RBC QN AUTO: 32.1 PG (ref 26–35)
MCHC RBC AUTO-ENTMCNC: 34.3 % (ref 32–34.5)
MCV RBC AUTO: 93.7 FL (ref 80–99.9)
METER GLUCOSE: 76 MG/DL (ref 74–99)
MONOCYTES ABSOLUTE: 0.68 E9/L (ref 0.1–0.95)
MONOCYTES RELATIVE PERCENT: 7.9 % (ref 2–12)
NEUTROPHILS ABSOLUTE: 4.69 E9/L (ref 1.8–7.3)
NEUTROPHILS RELATIVE PERCENT: 54.5 % (ref 43–80)
PDW BLD-RTO: 13.3 FL (ref 11.5–15)
PLATELET # BLD: 307 E9/L (ref 130–450)
PMV BLD AUTO: 9.7 FL (ref 7–12)
POTASSIUM SERPL-SCNC: 3.6 MMOL/L (ref 3.5–5)
RBC # BLD: 3.83 E12/L (ref 3.5–5.5)
SODIUM BLD-SCNC: 142 MMOL/L (ref 132–146)
WBC # BLD: 8.6 E9/L (ref 4.5–11.5)

## 2022-05-05 PROCEDURE — 6370000000 HC RX 637 (ALT 250 FOR IP): Performed by: INTERNAL MEDICINE

## 2022-05-05 PROCEDURE — 6370000000 HC RX 637 (ALT 250 FOR IP): Performed by: STUDENT IN AN ORGANIZED HEALTH CARE EDUCATION/TRAINING PROGRAM

## 2022-05-05 PROCEDURE — 80048 BASIC METABOLIC PNL TOTAL CA: CPT

## 2022-05-05 PROCEDURE — 83735 ASSAY OF MAGNESIUM: CPT

## 2022-05-05 PROCEDURE — 82962 GLUCOSE BLOOD TEST: CPT

## 2022-05-05 PROCEDURE — 2580000003 HC RX 258: Performed by: STUDENT IN AN ORGANIZED HEALTH CARE EDUCATION/TRAINING PROGRAM

## 2022-05-05 PROCEDURE — 83690 ASSAY OF LIPASE: CPT

## 2022-05-05 PROCEDURE — 2140000000 HC CCU INTERMEDIATE R&B

## 2022-05-05 PROCEDURE — 99232 SBSQ HOSP IP/OBS MODERATE 35: CPT | Performed by: INTERNAL MEDICINE

## 2022-05-05 PROCEDURE — 82150 ASSAY OF AMYLASE: CPT

## 2022-05-05 PROCEDURE — 85025 COMPLETE CBC W/AUTO DIFF WBC: CPT

## 2022-05-05 PROCEDURE — 6360000002 HC RX W HCPCS: Performed by: STUDENT IN AN ORGANIZED HEALTH CARE EDUCATION/TRAINING PROGRAM

## 2022-05-05 PROCEDURE — 36415 COLL VENOUS BLD VENIPUNCTURE: CPT

## 2022-05-05 RX ORDER — DEXTROSE AND SODIUM CHLORIDE 5; .9 G/100ML; G/100ML
INJECTION, SOLUTION INTRAVENOUS CONTINUOUS
Status: ACTIVE | OUTPATIENT
Start: 2022-05-05 | End: 2022-05-05

## 2022-05-05 RX ORDER — DOCUSATE SODIUM 100 MG/1
100 CAPSULE, LIQUID FILLED ORAL DAILY
Status: DISCONTINUED | OUTPATIENT
Start: 2022-05-05 | End: 2022-05-10

## 2022-05-05 RX ADMIN — Medication 45 MG: at 18:44

## 2022-05-05 RX ADMIN — Medication 10 ML: at 08:38

## 2022-05-05 RX ADMIN — DOCUSATE SODIUM 100 MG: 100 CAPSULE, LIQUID FILLED ORAL at 12:44

## 2022-05-05 RX ADMIN — POLYETHYLENE GLYCOL 3350 17 G: 17 POWDER, FOR SOLUTION ORAL at 08:38

## 2022-05-05 RX ADMIN — SENNOSIDES 17.2 MG: 8.6 TABLET, COATED ORAL at 23:08

## 2022-05-05 RX ADMIN — DICYCLOMINE HYDROCHLORIDE 10 MG: 10 CAPSULE ORAL at 08:40

## 2022-05-05 RX ADMIN — Medication 10 ML: at 23:08

## 2022-05-05 RX ADMIN — QUETIAPINE FUMARATE 100 MG: 100 TABLET ORAL at 23:08

## 2022-05-05 RX ADMIN — DEXTROSE AND SODIUM CHLORIDE: 5; 900 INJECTION, SOLUTION INTRAVENOUS at 09:18

## 2022-05-05 RX ADMIN — WATER 1000 MG: 1 INJECTION INTRAMUSCULAR; INTRAVENOUS; SUBCUTANEOUS at 14:19

## 2022-05-05 RX ADMIN — Medication 155 MG: at 08:38

## 2022-05-05 RX ADMIN — ENOXAPARIN SODIUM 30 MG: 100 INJECTION SUBCUTANEOUS at 08:39

## 2022-05-05 RX ADMIN — ENOXAPARIN SODIUM 30 MG: 100 INJECTION SUBCUTANEOUS at 23:07

## 2022-05-05 RX ADMIN — QUETIAPINE FUMARATE 100 MG: 100 TABLET ORAL at 08:40

## 2022-05-05 RX ADMIN — SENNOSIDES 17.2 MG: 8.6 TABLET, COATED ORAL at 08:39

## 2022-05-05 ASSESSMENT — PAIN SCALES - GENERAL
PAINLEVEL_OUTOF10: 10
PAINLEVEL_OUTOF10: 0

## 2022-05-05 NOTE — PROGRESS NOTES
Left hand mildly swollen from infiltrate from NSS IV infusion yesterday. Pt refuses heating pad. Left hand is elevated on pillow. No redness or increased temp.

## 2022-05-05 NOTE — PROGRESS NOTES
[facility]  [facility] Medicine Clinic    Attending Physician Statement:    I have discussed the case, including pertinent history and exam findings with the resident. I agree with the assessment, plan and orders as documented by the resident. Patient here for routine follow up of medical problems. Bradycardia: 2/2 combination of clonidine and methadone; evaluated by EP and clonidine being weaned; bradycardia has resolved; Hx Opiate Dependence: currently prescribed methadone; being continued at this time; Constipation: has not had bowel movement in 4 days; plan to perform rectal exam today; discussed with patient that her decreased appetite likely related to her constipation; discussed with patient to have enema and glycolax to improve symptoms; consider opiate oral reversal agent if glycolax and enema do not improve    Once patient tolerates diet; plan to discharge     Remainder of medical problems as per resident note.

## 2022-05-05 NOTE — PROGRESS NOTES
[facility]  Internal Medicine Residency Program  Progress Note - House Team     Patient:  Jina Louis 36 y.o. female MRN: [id number]     Date of Service: 5/5/2022     CC: low heart rate, chest pain  Overnight events: Patient had nausea, vomiting. Patient is not able to keep anything down. Subjective     Patient was seen and examined this morning at bedside in no acute distress. Overnight, patient continued to have abdominal pain, localized to epigastric area, associated with nausea and vomiting. Patient is unable to eat or keep anything down. Patient did pass small amount of stool and has been passing flatus. Abdominal x-ray ordered. Patient denies any chest pain, headache, blurry vision, bladder complaints. Objective     Physical Exam:  Vitals: BP (!) 153/96   Pulse 63   Temp 98 °F (36.7 °C) (Temporal)   Resp 16   Ht 5' 4\" (1.626 m)   Wt 287 lb 3.2 oz (130.3 kg)   SpO2 97%   BMI 49.30 kg/m²     I & O - 24hr: No intake/output data recorded. General Appearance: alert, appears stated age and cooperative, in mild distress due to pain  HEENT:  Head: Normocephalic, no lesions, without obvious abnormality. Neck: no adenopathy, no carotid bruit, no JVD, supple, symmetrical, trachea midline and thyroid not enlarged, symmetric, no tenderness/mass/nodules  Lung: clear to auscultation bilaterally  Heart: S1, S2 normal, HR 50-60   Abdomen: Soft, tenderness in epigastric area, no hepatosplenomegaly, bowel sounds sluggish  Extremities:  extremities normal, atraumatic, no cyanosis or edema  Musculokeletal: No joint swelling, no muscle tenderness. ROM normal in all joints of extremities.    Neurologic: Mental status: Alert, oriented, thought content appropriate  Subject  Pertinent Labs & Imaging Studies   dean  CBC with Differential:    Lab Results   Component Value Date    WBC 8.6 05/05/2022    RBC 3.83 05/05/2022    HGB 12.3 05/05/2022    HCT 35.9 05/05/2022     05/05/2022 MCV 93.7 05/05/2022    MCH 32.1 05/05/2022    MCHC 34.3 05/05/2022    RDW 13.3 05/05/2022    LYMPHOPCT 34.8 05/05/2022    MONOPCT 7.9 05/05/2022    BASOPCT 0.5 05/05/2022    MONOSABS 0.68 05/05/2022    LYMPHSABS 2.99 05/05/2022    EOSABS 0.17 05/05/2022    BASOSABS 0.04 05/05/2022     CMP:    Lab Results   Component Value Date     05/05/2022    K 3.6 05/05/2022    K 3.8 05/02/2022     05/05/2022    CO2 25 05/05/2022    BUN 6 05/05/2022    CREATININE 1.0 05/05/2022    GFRAA >60 05/05/2022    LABGLOM >60 05/05/2022    GLUCOSE 69 05/05/2022    PROT 8.1 05/02/2022    LABALBU 3.9 05/02/2022    CALCIUM 9.0 05/05/2022    BILITOT 0.3 05/02/2022    ALKPHOS 122 05/02/2022    AST 51 05/02/2022    ALT 47 05/02/2022     Magnesium:    Lab Results   Component Value Date    MG 2.0 05/05/2022     Phosphorus:  No results found for: PHOS  PT/INR:    Lab Results   Component Value Date    PROTIME 12.9 11/23/2019    INR 1.1 11/23/2019     Troponin:    Lab Results   Component Value Date    TROPONINI <0.01 04/23/2021     U/A:    Lab Results   Component Value Date    COLORU Yellow 05/04/2022    PROTEINU Negative 05/04/2022    PHUR 6.0 05/04/2022    WBCUA 2-5 05/04/2022    RBCUA 0-1 05/04/2022    RBCUA >20 09/17/2012    MUCUS Present 09/15/2016    BACTERIA MODERATE 05/04/2022    CLARITYU Clear 05/04/2022    SPECGRAV 1.020 05/04/2022    LEUKOCYTESUR SMALL 05/04/2022    UROBILINOGEN 1.0 05/04/2022    BILIRUBINUR Negative 05/04/2022    BLOODU TRACE-LYSED 05/04/2022    GLUCOSEU Negative 05/04/2022     HgBA1c:  No results found for: LABA1C  TSH:    Lab Results   Component Value Date    TSH 1.340 05/02/2022     FOLATE:  No results found for: FOLATE  IRON:  No results found for: IRON  FERRITIN:  No results found for: FERRITIN    XR ABDOMEN (2 VIEWS)   Final Result   Unremarkable bowel gas pattern, with no evidence of obstruction or free air. Moderate fecal retention. CTA PULMONARY W CONTRAST   Final Result   1.  Minimal dependent atelectasis seen within the right lung base posteriorly   and trace right pleural effusion. 2. There is no pulmonary embolus              Resident's Assessment and Plan     Assessment and Plan:    Sinus bradycardia 2/2 multiple AV yennifer blocking agents (methadone, clonidine)- resolved  Abdominal pain 2/2 chronic constipation vs ? UTI  Abdo Xray(5/4/22): Moderate fecal retention, no evidence of obstruction or free air  Extremely foul-smelling urine with mucus, new onset lower abdominal pain  UA: Small leukocyte Estrace, moderate bacteria, WBC 2-5  FOBT: Negative  Urine culture growing E. Coli, > 1 lac CFU/ml  Hypertensive urgency 2/2 ? cocaine use  Polysubstance abuse-UDS positive for cocaine/methadone(ongoing treatment)  Mood disorder-on Seroquel     Plan:  Continue methadone and clonidine taper  Replace potassium as needed  Do not use beta-blocker 2/2 bradycardia  Continue tablet Bentyl 10 mg 3 times daily as needed  Soap modesto enema and manual fecal disimpaction  Encourage oral intake   Continue bowel regime   Injection Tigan 200 mg IM and Zofran as needed for nausea   Injection ceftriaxone 1 g IV daily as patient has poor oral intake.  Follow sensitivity results to modify antibiotic regimen  Monitor heart rate        PT/OT evaluation:   DVT prophylaxis/ GI prophylaxis: PCDs/ protonix  Disposition: continue current care    Montana Velez MD, PGY-1  Attending physician: Dr. José Miguel Samayoa

## 2022-05-06 LAB
METER GLUCOSE: 88 MG/DL (ref 74–99)
ORGANISM: ABNORMAL
URINE CULTURE, ROUTINE: ABNORMAL

## 2022-05-06 PROCEDURE — 1200000000 HC SEMI PRIVATE

## 2022-05-06 PROCEDURE — 99232 SBSQ HOSP IP/OBS MODERATE 35: CPT | Performed by: INTERNAL MEDICINE

## 2022-05-06 PROCEDURE — 6360000002 HC RX W HCPCS: Performed by: STUDENT IN AN ORGANIZED HEALTH CARE EDUCATION/TRAINING PROGRAM

## 2022-05-06 PROCEDURE — 2580000003 HC RX 258: Performed by: STUDENT IN AN ORGANIZED HEALTH CARE EDUCATION/TRAINING PROGRAM

## 2022-05-06 PROCEDURE — 82962 GLUCOSE BLOOD TEST: CPT

## 2022-05-06 PROCEDURE — 6370000000 HC RX 637 (ALT 250 FOR IP): Performed by: STUDENT IN AN ORGANIZED HEALTH CARE EDUCATION/TRAINING PROGRAM

## 2022-05-06 PROCEDURE — 6370000000 HC RX 637 (ALT 250 FOR IP): Performed by: INTERNAL MEDICINE

## 2022-05-06 RX ADMIN — Medication 10 ML: at 10:04

## 2022-05-06 RX ADMIN — SENNOSIDES 17.2 MG: 8.6 TABLET, COATED ORAL at 08:26

## 2022-05-06 RX ADMIN — POLYETHYLENE GLYCOL 3350 17 G: 17 POWDER, FOR SOLUTION ORAL at 08:27

## 2022-05-06 RX ADMIN — QUETIAPINE FUMARATE 100 MG: 100 TABLET ORAL at 08:26

## 2022-05-06 RX ADMIN — NALOXEGOL OXALATE 25 MG: 25 TABLET, FILM COATED ORAL at 17:04

## 2022-05-06 RX ADMIN — ENOXAPARIN SODIUM 30 MG: 100 INJECTION SUBCUTANEOUS at 08:27

## 2022-05-06 RX ADMIN — ALUMINA, MAGNESIA, AND SIMETHICONE ORAL SUSPENSION REGULAR STRENGTH: 1200; 1200; 120 SUSPENSION ORAL at 14:23

## 2022-05-06 RX ADMIN — Medication 155 MG: at 08:26

## 2022-05-06 RX ADMIN — QUETIAPINE FUMARATE 100 MG: 100 TABLET ORAL at 20:23

## 2022-05-06 RX ADMIN — SODIUM CHLORIDE, PRESERVATIVE FREE 10 ML: 5 INJECTION INTRAVENOUS at 14:23

## 2022-05-06 RX ADMIN — Medication 45 MG: at 17:43

## 2022-05-06 RX ADMIN — DOCUSATE SODIUM 100 MG: 100 CAPSULE, LIQUID FILLED ORAL at 08:26

## 2022-05-06 RX ADMIN — SENNOSIDES 17.2 MG: 8.6 TABLET, COATED ORAL at 20:23

## 2022-05-06 ASSESSMENT — PAIN SCALES - GENERAL
PAINLEVEL_OUTOF10: 0
PAINLEVEL_OUTOF10: 0
PAINLEVEL_OUTOF10: 8

## 2022-05-06 NOTE — PROGRESS NOTES
[facility]  Internal Medicine Residency Program  Progress Note - House Team     Patient:  Dora Brooke 36 y.o. female MRN: [id number]     Date of Service: 5/6/2022     CC: low heart rate, chest pain  Overnight events: Patient had nausea, vomiting. Patient is not able to keep anything down. Subjective     Patient was seen and examined this morning at bedside in distress. Overnight, patient continued to have abdominal pain, and nausea. patient mentioned with bowel regiment he had little bit of bowel movement. Patient is unable to eat or keep anything down and constantly having nausea during the ncounter. Her Urine is growing e coli and patient is on ceftriaxone. Objective     Physical Exam:  Vitals: BP (!) 147/95   Pulse 90   Temp 98 °F (36.7 °C) (Oral)   Resp 18   Ht 5' 4\" (1.626 m)   Wt 287 lb 3.2 oz (130.3 kg)   SpO2 98%   BMI 49.30 kg/m²     I & O - 24hr: No intake/output data recorded. General Appearance: alert, appears stated age and cooperative, in mild distress due to pain  HEENT:  Head: Normocephalic, no lesions, without obvious abnormality. Neck: no adenopathy, no carotid bruit, no JVD, supple, symmetrical, trachea midline and thyroid not enlarged, symmetric, no tenderness/mass/nodules  Lung: clear to auscultation bilaterally  Heart: S1, S2 normal, HR 50-60   Abdomen: Soft, tenderness in epigastric area, no hepatosplenomegaly, bowel sounds sluggish  Extremities:  extremities normal, atraumatic, no cyanosis or edema  Musculokeletal: No joint swelling, no muscle tenderness. ROM normal in all joints of extremities.    Neurologic: Mental status: Alert, oriented, thought content appropriate  Subject  Pertinent Labs & Imaging Studies   dean  CBC with Differential:    Lab Results   Component Value Date    WBC 8.6 05/05/2022    RBC 3.83 05/05/2022    HGB 12.3 05/05/2022    HCT 35.9 05/05/2022     05/05/2022    MCV 93.7 05/05/2022    MCH 32.1 05/05/2022    MCHC 34.3 05/05/2022    RDW 13.3 05/05/2022    LYMPHOPCT 34.8 05/05/2022    MONOPCT 7.9 05/05/2022    BASOPCT 0.5 05/05/2022    MONOSABS 0.68 05/05/2022    LYMPHSABS 2.99 05/05/2022    EOSABS 0.17 05/05/2022    BASOSABS 0.04 05/05/2022     CMP:    Lab Results   Component Value Date     05/05/2022    K 3.6 05/05/2022    K 3.8 05/02/2022     05/05/2022    CO2 25 05/05/2022    BUN 6 05/05/2022    CREATININE 1.0 05/05/2022    GFRAA >60 05/05/2022    LABGLOM >60 05/05/2022    GLUCOSE 69 05/05/2022    PROT 8.1 05/02/2022    LABALBU 3.9 05/02/2022    CALCIUM 9.0 05/05/2022    BILITOT 0.3 05/02/2022    ALKPHOS 122 05/02/2022    AST 51 05/02/2022    ALT 47 05/02/2022     Magnesium:    Lab Results   Component Value Date    MG 2.0 05/05/2022     Phosphorus:  No results found for: PHOS  PT/INR:    Lab Results   Component Value Date    PROTIME 12.9 11/23/2019    INR 1.1 11/23/2019     Troponin:    Lab Results   Component Value Date    TROPONINI <0.01 04/23/2021     U/A:    Lab Results   Component Value Date    COLORU Yellow 05/04/2022    PROTEINU Negative 05/04/2022    PHUR 6.0 05/04/2022    WBCUA 2-5 05/04/2022    RBCUA 0-1 05/04/2022    RBCUA >20 09/17/2012    MUCUS Present 09/15/2016    BACTERIA MODERATE 05/04/2022    CLARITYU Clear 05/04/2022    SPECGRAV 1.020 05/04/2022    LEUKOCYTESUR SMALL 05/04/2022    UROBILINOGEN 1.0 05/04/2022    BILIRUBINUR Negative 05/04/2022    BLOODU TRACE-LYSED 05/04/2022    GLUCOSEU Negative 05/04/2022     HgBA1c:  No results found for: LABA1C  TSH:    Lab Results   Component Value Date    TSH 1.340 05/02/2022     FOLATE:  No results found for: FOLATE  IRON:  No results found for: IRON  FERRITIN:  No results found for: FERRITIN    XR ABDOMEN (2 VIEWS)   Final Result   Unremarkable bowel gas pattern, with no evidence of obstruction or free air. Moderate fecal retention. CTA PULMONARY W CONTRAST   Final Result   1.  Minimal dependent atelectasis seen within the right lung base posteriorly   and trace right pleural effusion. 2. There is no pulmonary embolus              Resident's Assessment and Plan     Assessment and Plan:    Sinus bradycardia 2/2 multiple AV yennifer blocking agents (methadone, clonidine)- resolved  Abdominal pain 2/2 chronic constipation vs ? UTI  Abdo Xray(5/4/22): Moderate fecal retention, no evidence of obstruction or free air  Extremely foul-smelling urine with mucus, new onset lower abdominal pain  UA: Small leukocyte Estrace, moderate bacteria, WBC 2-5  FOBT: Negative  Urine culture growing E. Coli, > 1 lac CFU/ml  Hypertensive urgency 2/2 ? cocaine use  Polysubstance abuse-UDS positive for cocaine/methadone(ongoing treatment)  Mood disorder-on Seroquel  Constipation 2/2 methdone vs non ambulation vs poor fluid intake      Plan:  Continue methadone and clonidine taper  Replace potassium as needed  Do not use beta-blocker 2/2 bradycardia  Continue tablet Bentyl 10 mg 3 times daily as needed  Soap modesto enema and manual fecal disimpaction  Encourage oral intake   Continue bowel regime; Movantik given this morning. Injection Tigan 200 mg IM and Zofran as needed for nausea   Injection ceftriaxone 1 g IV daily as patient has poor oral intake.  Follow sensitivity results to modify antibiotic regimen  Monitor heart rate        PT/OT evaluation:   DVT prophylaxis/ GI prophylaxis: PCDs/ protonix  Disposition: continue current care    Christa Ray MD, PGY-1  Attending physician: Dr. Pratik Allen

## 2022-05-06 NOTE — PROGRESS NOTES
Patient came to floor with meal tray. Patient requested to eat but was also stating she has nausea. Patient declined Zofran at this time. Education provided but she would like to wait to take anti-emetic. Patient stated that she had emesis of the salad that was ingested. No BM reported as of this time.

## 2022-05-06 NOTE — PROGRESS NOTES
[facility]  [facility]    Attending Physician Statement:    I have discussed the case, including pertinent history and exam findings with the resident. I agree with the assessment, plan and orders as documented by the resident. Patient here for routine follow up of medical problems. Bradycardia: 2/2 combination of clonidine and methadone; evaluated by EP and clonidine being weaned; bradycardia has resolved; Hx Opiate Dependence: currently prescribed methadone; being continued at this time; Constipation: has not had bowel movement in 4 days; plan to perform rectal exam today; discussed with patient that her decreased appetite likely related to her constipation; discussed with patient to have enema and glycolax to improve symptoms; will start Naloxegol 25 mg daily today    UTI: treating with ceftriaxone for 5 total days and will transition to cefdinir    Once patient tolerates diet and defecates; plan to discharge     Remainder of medical problems as per resident note.

## 2022-05-06 NOTE — PROGRESS NOTES
Pt offered Nausea medication. RN told her that the DR notified nursing that she wanted it. Patient stated that she has nausea, but did not want the medication. Pt also refused GI cocktail stating that she would take it in an hour or two.      Kunal Bowers RN

## 2022-05-06 NOTE — ADT AUTH CERT
GOOD AFTERNOON, WE ARE USING AUTH# [id number] WHICH HAS BEEN APPROVED FOR IP ADMISSION AND  RAN ANOTHER AUTH# OF [id number]. PLEASE CANCEL THAT AUTH # DUE TO USING THE ONE THAT IS APPROVED.  Hank Bartlett

## 2022-05-07 LAB
ANION GAP SERPL CALCULATED.3IONS-SCNC: 15 MMOL/L (ref 7–16)
BASOPHILS ABSOLUTE: 0.02 E9/L (ref 0–0.2)
BASOPHILS RELATIVE PERCENT: 0.3 % (ref 0–2)
BUN BLDV-MCNC: 9 MG/DL (ref 6–20)
CALCIUM SERPL-MCNC: 9.5 MG/DL (ref 8.6–10.2)
CHLORIDE BLD-SCNC: 104 MMOL/L (ref 98–107)
CO2: 21 MMOL/L (ref 22–29)
CREAT SERPL-MCNC: 0.9 MG/DL (ref 0.5–1)
EOSINOPHILS ABSOLUTE: 0.24 E9/L (ref 0.05–0.5)
EOSINOPHILS RELATIVE PERCENT: 3.4 % (ref 0–6)
GFR AFRICAN AMERICAN: >60
GFR NON-AFRICAN AMERICAN: >60 ML/MIN/1.73
GLUCOSE BLD-MCNC: 90 MG/DL (ref 74–99)
HCT VFR BLD CALC: 42.1 % (ref 34–48)
HEMOGLOBIN: 14.6 G/DL (ref 11.5–15.5)
IMMATURE GRANULOCYTES #: 0.03 E9/L
IMMATURE GRANULOCYTES %: 0.4 % (ref 0–5)
LYMPHOCYTES ABSOLUTE: 2.29 E9/L (ref 1.5–4)
LYMPHOCYTES RELATIVE PERCENT: 32.1 % (ref 20–42)
MAGNESIUM: 2.2 MG/DL (ref 1.6–2.6)
MCH RBC QN AUTO: 31.9 PG (ref 26–35)
MCHC RBC AUTO-ENTMCNC: 34.7 % (ref 32–34.5)
MCV RBC AUTO: 91.9 FL (ref 80–99.9)
METER GLUCOSE: 92 MG/DL (ref 74–99)
MONOCYTES ABSOLUTE: 0.87 E9/L (ref 0.1–0.95)
MONOCYTES RELATIVE PERCENT: 12.2 % (ref 2–12)
NEUTROPHILS ABSOLUTE: 3.68 E9/L (ref 1.8–7.3)
NEUTROPHILS RELATIVE PERCENT: 51.6 % (ref 43–80)
PDW BLD-RTO: 13.9 FL (ref 11.5–15)
PLATELET # BLD: 336 E9/L (ref 130–450)
PMV BLD AUTO: 9.5 FL (ref 7–12)
POTASSIUM SERPL-SCNC: 3.7 MMOL/L (ref 3.5–5)
RBC # BLD: 4.58 E12/L (ref 3.5–5.5)
SODIUM BLD-SCNC: 140 MMOL/L (ref 132–146)
WBC # BLD: 7.1 E9/L (ref 4.5–11.5)

## 2022-05-07 PROCEDURE — 6370000000 HC RX 637 (ALT 250 FOR IP)

## 2022-05-07 PROCEDURE — 6360000002 HC RX W HCPCS: Performed by: INTERNAL MEDICINE

## 2022-05-07 PROCEDURE — 6370000000 HC RX 637 (ALT 250 FOR IP): Performed by: INTERNAL MEDICINE

## 2022-05-07 PROCEDURE — 6360000002 HC RX W HCPCS: Performed by: STUDENT IN AN ORGANIZED HEALTH CARE EDUCATION/TRAINING PROGRAM

## 2022-05-07 PROCEDURE — 6370000000 HC RX 637 (ALT 250 FOR IP): Performed by: STUDENT IN AN ORGANIZED HEALTH CARE EDUCATION/TRAINING PROGRAM

## 2022-05-07 PROCEDURE — 2580000003 HC RX 258: Performed by: STUDENT IN AN ORGANIZED HEALTH CARE EDUCATION/TRAINING PROGRAM

## 2022-05-07 PROCEDURE — 36415 COLL VENOUS BLD VENIPUNCTURE: CPT

## 2022-05-07 PROCEDURE — 85025 COMPLETE CBC W/AUTO DIFF WBC: CPT

## 2022-05-07 PROCEDURE — 82962 GLUCOSE BLOOD TEST: CPT

## 2022-05-07 PROCEDURE — 80048 BASIC METABOLIC PNL TOTAL CA: CPT

## 2022-05-07 PROCEDURE — 83735 ASSAY OF MAGNESIUM: CPT

## 2022-05-07 PROCEDURE — 1200000000 HC SEMI PRIVATE

## 2022-05-07 PROCEDURE — 99232 SBSQ HOSP IP/OBS MODERATE 35: CPT | Performed by: INTERNAL MEDICINE

## 2022-05-07 RX ORDER — BISACODYL 10 MG
10 SUPPOSITORY, RECTAL RECTAL ONCE
Status: COMPLETED | OUTPATIENT
Start: 2022-05-07 | End: 2022-05-08

## 2022-05-07 RX ADMIN — METHYLNALTREXONE BROMIDE 12 MG: 12 INJECTION, SOLUTION SUBCUTANEOUS at 13:26

## 2022-05-07 RX ADMIN — ENOXAPARIN SODIUM 30 MG: 100 INJECTION SUBCUTANEOUS at 21:55

## 2022-05-07 RX ADMIN — Medication 45 MG: at 17:58

## 2022-05-07 RX ADMIN — SENNOSIDES 17.2 MG: 8.6 TABLET, COATED ORAL at 09:36

## 2022-05-07 RX ADMIN — WATER 1000 MG: 1 INJECTION INTRAMUSCULAR; INTRAVENOUS; SUBCUTANEOUS at 14:36

## 2022-05-07 RX ADMIN — Medication 155 MG: at 09:39

## 2022-05-07 RX ADMIN — POLYETHYLENE GLYCOL 3350 17 G: 17 POWDER, FOR SOLUTION ORAL at 09:36

## 2022-05-07 RX ADMIN — QUETIAPINE FUMARATE 100 MG: 100 TABLET ORAL at 09:36

## 2022-05-07 RX ADMIN — SENNOSIDES 17.2 MG: 8.6 TABLET, COATED ORAL at 21:55

## 2022-05-07 RX ADMIN — QUETIAPINE FUMARATE 100 MG: 100 TABLET ORAL at 21:55

## 2022-05-07 RX ADMIN — DOCUSATE SODIUM 100 MG: 100 CAPSULE, LIQUID FILLED ORAL at 09:36

## 2022-05-07 RX ADMIN — Medication 10 ML: at 09:50

## 2022-05-07 RX ADMIN — ENOXAPARIN SODIUM 30 MG: 100 INJECTION SUBCUTANEOUS at 09:37

## 2022-05-07 ASSESSMENT — PAIN - FUNCTIONAL ASSESSMENT
PAIN_FUNCTIONAL_ASSESSMENT: PREVENTS OR INTERFERES SOME ACTIVE ACTIVITIES AND ADLS
PAIN_FUNCTIONAL_ASSESSMENT: ACTIVITIES ARE NOT PREVENTED
PAIN_FUNCTIONAL_ASSESSMENT: PREVENTS OR INTERFERES SOME ACTIVE ACTIVITIES AND ADLS

## 2022-05-07 ASSESSMENT — PAIN DESCRIPTION - DESCRIPTORS
DESCRIPTORS: TIGHTNESS;TENDER;ACHING
DESCRIPTORS: TENDER;SORE;DISCOMFORT
DESCRIPTORS: CRAMPING;DISCOMFORT;PRESSURE

## 2022-05-07 ASSESSMENT — PAIN DESCRIPTION - ONSET
ONSET: SUDDEN
ONSET: ON-GOING
ONSET: ON-GOING

## 2022-05-07 ASSESSMENT — PAIN DESCRIPTION - LOCATION
LOCATION: CHEST;ABDOMEN
LOCATION: CHEST;ABDOMEN
LOCATION: ABDOMEN

## 2022-05-07 ASSESSMENT — PAIN DESCRIPTION - ORIENTATION
ORIENTATION: ANTERIOR;MID
ORIENTATION: MID
ORIENTATION: ANTERIOR;MID

## 2022-05-07 ASSESSMENT — PAIN SCALES - GENERAL
PAINLEVEL_OUTOF10: 9
PAINLEVEL_OUTOF10: 10
PAINLEVEL_OUTOF10: 8
PAINLEVEL_OUTOF10: 10
PAINLEVEL_OUTOF10: 7

## 2022-05-07 ASSESSMENT — PAIN DESCRIPTION - PAIN TYPE
TYPE: ACUTE PAIN

## 2022-05-07 ASSESSMENT — PAIN DESCRIPTION - FREQUENCY
FREQUENCY: CONTINUOUS
FREQUENCY: CONTINUOUS
FREQUENCY: INTERMITTENT

## 2022-05-07 NOTE — PROGRESS NOTES
[facility]  Internal Medicine Residency Program  Progress Note - House Team     Patient:  Bao Joy 36 y.o. female MRN: [id number]     Date of Service: 5/7/2022     CC: low heart rate, chest pain  Overnight events: Patient had nausea, vomiting. Patient is not able to keep anything down. Subjective     Patient was seen and examined this morning at bedside in distress. Overnight, patient continued to have abdominal pain, and nausea. patient mentioned with Movantik she had little bit of bowel movement. Patient is unable to eat or keep anything down and constantly having nausea during the encounter. Her Urine is growing e coli and patient is on ceftriaxone. Objective     Physical Exam:  Vitals: /86   Pulse 79   Temp 97.8 °F (36.6 °C) (Temporal)   Resp 18   Ht 5' 4\" (1.626 m)   Wt 287 lb 3.2 oz (130.3 kg)   SpO2 99%   BMI 49.30 kg/m²     I & O - 24hr: I/O this shift:  In: 120 [P.O.:120]  Out: -    General Appearance: alert, appears stated age and cooperative, in mild distress due to pain  HEENT:  Head: Normocephalic, no lesions, without obvious abnormality. Neck: no adenopathy, no carotid bruit, no JVD, supple, symmetrical, trachea midline and thyroid not enlarged, symmetric, no tenderness/mass/nodules  Lung: clear to auscultation bilaterally  Heart: S1, S2 normal, HR 50-60   Abdomen: Soft, tenderness in epigastric area, no hepatosplenomegaly, bowel sounds sluggish  Extremities:  extremities normal, atraumatic, no cyanosis or edema  Musculokeletal: No joint swelling, no muscle tenderness. ROM normal in all joints of extremities.    Neurologic: Mental status: Alert, oriented, thought content appropriate  Subject  Pertinent Labs & Imaging Studies   dean  CBC with Differential:    Lab Results   Component Value Date    WBC 7.1 05/07/2022    RBC 4.58 05/07/2022    HGB 14.6 05/07/2022    HCT 42.1 05/07/2022     05/07/2022    MCV 91.9 05/07/2022    MCH 31.9 05/07/2022 MCHC 34.7 05/07/2022    RDW 13.9 05/07/2022    LYMPHOPCT 32.1 05/07/2022    MONOPCT 12.2 05/07/2022    BASOPCT 0.3 05/07/2022    MONOSABS 0.87 05/07/2022    LYMPHSABS 2.29 05/07/2022    EOSABS 0.24 05/07/2022    BASOSABS 0.02 05/07/2022     CMP:    Lab Results   Component Value Date     05/07/2022    K 3.7 05/07/2022    K 3.8 05/02/2022     05/07/2022    CO2 21 05/07/2022    BUN 9 05/07/2022    CREATININE 0.9 05/07/2022    GFRAA >60 05/07/2022    LABGLOM >60 05/07/2022    GLUCOSE 90 05/07/2022    PROT 8.1 05/02/2022    LABALBU 3.9 05/02/2022    CALCIUM 9.5 05/07/2022    BILITOT 0.3 05/02/2022    ALKPHOS 122 05/02/2022    AST 51 05/02/2022    ALT 47 05/02/2022     Magnesium:    Lab Results   Component Value Date    MG 2.2 05/07/2022     Phosphorus:  No results found for: PHOS  PT/INR:    Lab Results   Component Value Date    PROTIME 12.9 11/23/2019    INR 1.1 11/23/2019     Troponin:    Lab Results   Component Value Date    TROPONINI <0.01 04/23/2021     U/A:    Lab Results   Component Value Date    COLORU Yellow 05/04/2022    PROTEINU Negative 05/04/2022    PHUR 6.0 05/04/2022    WBCUA 2-5 05/04/2022    RBCUA 0-1 05/04/2022    RBCUA >20 09/17/2012    MUCUS Present 09/15/2016    BACTERIA MODERATE 05/04/2022    CLARITYU Clear 05/04/2022    SPECGRAV 1.020 05/04/2022    LEUKOCYTESUR SMALL 05/04/2022    UROBILINOGEN 1.0 05/04/2022    BILIRUBINUR Negative 05/04/2022    BLOODU TRACE-LYSED 05/04/2022    GLUCOSEU Negative 05/04/2022     HgBA1c:  No results found for: LABA1C  TSH:    Lab Results   Component Value Date    TSH 1.340 05/02/2022     FOLATE:  No results found for: FOLATE  IRON:  No results found for: IRON  FERRITIN:  No results found for: FERRITIN    XR ABDOMEN (2 VIEWS)   Final Result   Unremarkable bowel gas pattern, with no evidence of obstruction or free air. Moderate fecal retention. CTA PULMONARY W CONTRAST   Final Result   1.  Minimal dependent atelectasis seen within the right lung base posteriorly   and trace right pleural effusion. 2. There is no pulmonary embolus              Resident's Assessment and Plan     Assessment and Plan:    Sinus bradycardia 2/2 multiple AV yennifer blocking agents (methadone, clonidine)- resolved  Abdominal pain 2/2 chronic constipation vs ? UTI  Abdo Xray(5/4/22): Moderate fecal retention, no evidence of obstruction or free air  Extremely foul-smelling urine with mucus, new onset lower abdominal pain  UA: Small leukocyte Estrace, moderate bacteria, WBC 2-5  FOBT: Negative  Urine culture growing E. Coli, > 1 lac CFU/ml  Hypertensive urgency 2/2 ? cocaine use  Polysubstance abuse-UDS positive for cocaine/methadone(ongoing treatment)  Mood disorder-on Seroquel  Constipation 2/2 methdone vs non ambulation vs poor fluid intake      Plan:  Continue methadone and clonidine taper  Start naloxegol 25 mg daily  Continue bowel regime with GlycoLax and senna  Do not use beta-blocker 2/2 bradycardia  Continue tablet Bentyl 10 mg 3 times daily as needed  Soap modesto enema and manual fecal disimpaction  Encourage oral intake   Injection Tigan 200 mg IM and Zofran as needed for nausea   Injection ceftriaxone 1 g IV daily as patient has poor oral intake x 5 days followed by transition to cefdinir. Monitor heart rate  Encourage ambulation, and oral intake.         PT/OT evaluation:   DVT prophylaxis/ GI prophylaxis: PCDs/ protonix  Disposition: continue current care    Daisy Ramirez MD, PGY-1  Attending physician: Dr. Wallace Pete

## 2022-05-07 NOTE — PLAN OF CARE
Problem: Discharge Planning  Goal: Discharge to home or other facility with appropriate resources  Outcome: Progressing     Problem: Pain  Goal: Verbalizes/displays adequate comfort level or baseline comfort level  Outcome: Completed     Problem: Safety - Adult  Goal: Free from fall injury  Outcome: Completed     Problem: ABCDS Injury Assessment  Goal: Absence of physical injury  Outcome: Completed

## 2022-05-07 NOTE — PROGRESS NOTES
Sent a message to [employer] Team 2 Resident regarding the RN collecting the labs this morning. Lab already collected the BMP/CBC/Mag at 0821 this morning. They need to change the lab order to \"timed\" if they want their labs drawn at a specific time.

## 2022-05-07 NOTE — PROGRESS NOTES
[facility]  [facility]    Attending Physician Statement:    I have discussed the case, including pertinent history and exam findings with the resident. I agree with the assessment, plan and orders as documented by the resident. Patient here for routine follow up of medical problems. Bradycardia: 2/2 combination of clonidine and methadone; evaluated by EP and clonidine being weaned; bradycardia has resolved; Hx Opiate Dependence: currently prescribed methadone; being continued at this time; Constipation: has not had bowel movement in 4 days; plan to perform rectal exam today; discussed with patient that her decreased appetite likely related to her constipation; discussed with patient to have enema and glycolax to improve symptoms; will start Naloxegol 25 mg daily; encouraged eating and drinking today; patient states that she wishes \"to get home by tomorrow\"     UTI: treating with ceftriaxone for 5 total days and will transition to cefdinir    Once patient tolerates diet and defecates; plan to discharge     Remainder of medical problems as per resident note.

## 2022-05-07 NOTE — PROGRESS NOTES
I sent a message to IV team.  She has an IV antibiotic due this afternoon with no IV access. Apparently Anabell Hurd sent a message yesterday.

## 2022-05-08 PROCEDURE — 99232 SBSQ HOSP IP/OBS MODERATE 35: CPT | Performed by: INTERNAL MEDICINE

## 2022-05-08 PROCEDURE — 6370000000 HC RX 637 (ALT 250 FOR IP)

## 2022-05-08 PROCEDURE — 6370000000 HC RX 637 (ALT 250 FOR IP): Performed by: STUDENT IN AN ORGANIZED HEALTH CARE EDUCATION/TRAINING PROGRAM

## 2022-05-08 PROCEDURE — 1200000000 HC SEMI PRIVATE

## 2022-05-08 PROCEDURE — 6360000002 HC RX W HCPCS: Performed by: STUDENT IN AN ORGANIZED HEALTH CARE EDUCATION/TRAINING PROGRAM

## 2022-05-08 PROCEDURE — 6370000000 HC RX 637 (ALT 250 FOR IP): Performed by: INTERNAL MEDICINE

## 2022-05-08 PROCEDURE — 2580000003 HC RX 258: Performed by: STUDENT IN AN ORGANIZED HEALTH CARE EDUCATION/TRAINING PROGRAM

## 2022-05-08 RX ORDER — CEFDINIR 300 MG/1
300 CAPSULE ORAL EVERY 12 HOURS SCHEDULED
Status: DISCONTINUED | OUTPATIENT
Start: 2022-05-08 | End: 2022-05-10 | Stop reason: HOSPADM

## 2022-05-08 RX ADMIN — PANTOPRAZOLE SODIUM 40 MG: 40 TABLET, DELAYED RELEASE ORAL at 06:31

## 2022-05-08 RX ADMIN — BISACODYL 10 MG: 10 SUPPOSITORY RECTAL at 17:06

## 2022-05-08 RX ADMIN — ENOXAPARIN SODIUM 30 MG: 100 INJECTION SUBCUTANEOUS at 21:18

## 2022-05-08 RX ADMIN — Medication 155 MG: at 09:52

## 2022-05-08 RX ADMIN — QUETIAPINE FUMARATE 100 MG: 100 TABLET ORAL at 21:18

## 2022-05-08 RX ADMIN — SENNOSIDES 17.2 MG: 8.6 TABLET, COATED ORAL at 21:19

## 2022-05-08 RX ADMIN — DOCUSATE SODIUM 100 MG: 100 CAPSULE, LIQUID FILLED ORAL at 09:53

## 2022-05-08 RX ADMIN — SENNOSIDES 17.2 MG: 8.6 TABLET, COATED ORAL at 09:52

## 2022-05-08 RX ADMIN — Medication 45 MG: at 17:59

## 2022-05-08 RX ADMIN — QUETIAPINE FUMARATE 100 MG: 100 TABLET ORAL at 09:52

## 2022-05-08 RX ADMIN — CEFDINIR 300 MG: 300 CAPSULE ORAL at 21:18

## 2022-05-08 RX ADMIN — Medication 10 ML: at 09:55

## 2022-05-08 RX ADMIN — ENOXAPARIN SODIUM 30 MG: 100 INJECTION SUBCUTANEOUS at 09:55

## 2022-05-08 ASSESSMENT — PAIN - FUNCTIONAL ASSESSMENT
PAIN_FUNCTIONAL_ASSESSMENT: ACTIVITIES ARE NOT PREVENTED
PAIN_FUNCTIONAL_ASSESSMENT: ACTIVITIES ARE NOT PREVENTED

## 2022-05-08 ASSESSMENT — PAIN DESCRIPTION - LOCATION
LOCATION: ABDOMEN
LOCATION: ABDOMEN

## 2022-05-08 ASSESSMENT — PAIN DESCRIPTION - DESCRIPTORS
DESCRIPTORS: CRAMPING;DISCOMFORT;TENDER
DESCRIPTORS: CRAMPING;SORE;TENDER

## 2022-05-08 ASSESSMENT — PAIN SCALES - GENERAL
PAINLEVEL_OUTOF10: 7
PAINLEVEL_OUTOF10: 0
PAINLEVEL_OUTOF10: 9
PAINLEVEL_OUTOF10: 9
PAINLEVEL_OUTOF10: 7

## 2022-05-08 ASSESSMENT — PAIN DESCRIPTION - ORIENTATION
ORIENTATION: ANTERIOR;LOWER
ORIENTATION: ANTERIOR;LOWER

## 2022-05-08 ASSESSMENT — PAIN DESCRIPTION - FREQUENCY
FREQUENCY: INTERMITTENT
FREQUENCY: INTERMITTENT

## 2022-05-08 ASSESSMENT — PAIN DESCRIPTION - ONSET
ONSET: ON-GOING
ONSET: ON-GOING

## 2022-05-08 ASSESSMENT — PAIN DESCRIPTION - PAIN TYPE
TYPE: ACUTE PAIN
TYPE: ACUTE PAIN

## 2022-05-08 NOTE — PROGRESS NOTES
[facility]  Internal Medicine Residency Program  Progress Note - House Team     Patient:  Armaan Schwarz 36 y.o. female MRN: [id number]     Date of Service: 5/8/2022     CC: low heart rate, chest pain  Overnight events: Patient had nausea, vomiting. Patient is not able to keep anything down. Subjective     Patient was seen and examined this morning at bedside in distress. Overnight, patient received  Methylnaltrexone. Patient passing gas, still did not had any bowel movement. Patient's nausea improved. Antibiotic changed to oral, cefdinir today. Objective     Physical Exam:  Vitals: /87   Pulse 80   Temp 97.9 °F (36.6 °C) (Temporal)   Resp 17   Ht 5' 4\" (1.626 m)   Wt 287 lb 3.2 oz (130.3 kg)   SpO2 98%   BMI 49.30 kg/m²     I & O - 24hr: No intake/output data recorded. General Appearance: alert, appears stated age and cooperative, in mild distress due to pain  HEENT:  Head: Normocephalic, no lesions, without obvious abnormality. Neck: no adenopathy, no carotid bruit, no JVD, supple, symmetrical, trachea midline and thyroid not enlarged, symmetric, no tenderness/mass/nodules  Lung: clear to auscultation bilaterally  Heart: S1, S2 normal, HR 50-60   Abdomen: Soft, tenderness in epigastric area, no hepatosplenomegaly, bowel sounds sluggish  Extremities:  extremities normal, atraumatic, no cyanosis or edema  Musculokeletal: No joint swelling, no muscle tenderness. ROM normal in all joints of extremities.    Neurologic: Mental status: Alert, oriented, thought content appropriate  Subject  Pertinent Labs & Imaging Studies   dean  CBC with Differential:    Lab Results   Component Value Date    WBC 7.1 05/07/2022    RBC 4.58 05/07/2022    HGB 14.6 05/07/2022    HCT 42.1 05/07/2022     05/07/2022    MCV 91.9 05/07/2022    MCH 31.9 05/07/2022    MCHC 34.7 05/07/2022    RDW 13.9 05/07/2022    LYMPHOPCT 32.1 05/07/2022    MONOPCT 12.2 05/07/2022    BASOPCT 0.3 05/07/2022    MONOSABS 0.87 05/07/2022    LYMPHSABS 2.29 05/07/2022    EOSABS 0.24 05/07/2022    BASOSABS 0.02 05/07/2022     CMP:    Lab Results   Component Value Date     05/07/2022    K 3.7 05/07/2022    K 3.8 05/02/2022     05/07/2022    CO2 21 05/07/2022    BUN 9 05/07/2022    CREATININE 0.9 05/07/2022    GFRAA >60 05/07/2022    LABGLOM >60 05/07/2022    GLUCOSE 90 05/07/2022    PROT 8.1 05/02/2022    LABALBU 3.9 05/02/2022    CALCIUM 9.5 05/07/2022    BILITOT 0.3 05/02/2022    ALKPHOS 122 05/02/2022    AST 51 05/02/2022    ALT 47 05/02/2022     Magnesium:    Lab Results   Component Value Date    MG 2.2 05/07/2022     Phosphorus:  No results found for: PHOS  PT/INR:    Lab Results   Component Value Date    PROTIME 12.9 11/23/2019    INR 1.1 11/23/2019     Troponin:    Lab Results   Component Value Date    TROPONINI <0.01 04/23/2021     U/A:    Lab Results   Component Value Date    COLORU Yellow 05/04/2022    PROTEINU Negative 05/04/2022    PHUR 6.0 05/04/2022    WBCUA 2-5 05/04/2022    RBCUA 0-1 05/04/2022    RBCUA >20 09/17/2012    MUCUS Present 09/15/2016    BACTERIA MODERATE 05/04/2022    CLARITYU Clear 05/04/2022    SPECGRAV 1.020 05/04/2022    LEUKOCYTESUR SMALL 05/04/2022    UROBILINOGEN 1.0 05/04/2022    BILIRUBINUR Negative 05/04/2022    BLOODU TRACE-LYSED 05/04/2022    GLUCOSEU Negative 05/04/2022     HgBA1c:  No results found for: LABA1C  TSH:    Lab Results   Component Value Date    TSH 1.340 05/02/2022     FOLATE:  No results found for: FOLATE  IRON:  No results found for: IRON  FERRITIN:  No results found for: FERRITIN    XR ABDOMEN (2 VIEWS)   Final Result   Unremarkable bowel gas pattern, with no evidence of obstruction or free air. Moderate fecal retention. CTA PULMONARY W CONTRAST   Final Result   1. Minimal dependent atelectasis seen within the right lung base posteriorly   and trace right pleural effusion.    2. There is no pulmonary embolus              Resident's Assessment and Plan     Assessment and Plan:    Sinus bradycardia 2/2 multiple AV yennifer blocking agents (methadone, clonidine)- resolved  Abdominal pain 2/2 chronic constipation vs ? UTI  Abdo Xray(5/4/22): Moderate fecal retention, no evidence of obstruction or free air  Extremely foul-smelling urine with mucus, new onset lower abdominal pain  UA: Small leukocyte Estrace, moderate bacteria, WBC 2-5  FOBT: Negative  Urine culture growing E. Coli, > 1 lac CFU/ml  Hypertensive urgency 2/2 ? cocaine use  Polysubstance abuse-UDS positive for cocaine/methadone(ongoing treatment)  Mood disorder-on Seroquel  Constipation 2/2 methdone vs non ambulation vs poor fluid intake      Plan:  Continue methadone and clonidine taper  Start naloxegol 25 mg daily  Continue bowel regime with GlycoLax and senna  Do not use beta-blocker 2/2 bradycardia  Continue tablet Bentyl 10 mg 3 times daily as needed  Soap modesto enema and manual fecal disimpaction  Encourage oral intake   Injection Tigan 200 mg IM and Zofran as needed for nausea   Antibiotic transition to cefdinir. Monitor heart rate  Encourage ambulation, and oral intake.         PT/OT evaluation:   DVT prophylaxis/ GI prophylaxis: Lovenox/ protonix  Disposition: continue current care    Noemi Stapleton MD, PGY-1  Attending physician: Dr. Michelle Flores

## 2022-05-08 NOTE — PROGRESS NOTES
[facility]  [facility]    Attending Physician Statement:    I have discussed the case, including pertinent history and exam findings with the resident. I agree with the assessment, plan and orders as documented by the resident. Patient here for routine follow up of medical problems. Bradycardia: 2/2 combination of clonidine and methadone; evaluated by EP and clonidine being weaned; bradycardia has resolved; Hx Opiate Dependence: currently prescribed methadone; being continued at this time; Constipation: has not had bowel movement in 4 days; plan to perform rectal exam today; discussed with patient that her decreased appetite likely related to her constipation; discussed with patient to have enema and glycolax to improve symptoms; will start Naloxegol 25 mg daily; encouraged eating and drinking today; patient states that she wishes \"to get home by tomorrow\"     UTI: transition to cefdinir today 2/2 loss of IV access    Patient tolerating diet an passing flatulence; once patient has bowel movement she is stable for discharge home      Remainder of medical problems as per resident note.

## 2022-05-09 ENCOUNTER — APPOINTMENT (OUTPATIENT)
Dept: GENERAL RADIOLOGY | Age: 32
DRG: 201 | End: 2022-05-09
Payer: MEDICAID

## 2022-05-09 PROCEDURE — 6370000000 HC RX 637 (ALT 250 FOR IP): Performed by: INTERNAL MEDICINE

## 2022-05-09 PROCEDURE — 1200000000 HC SEMI PRIVATE

## 2022-05-09 PROCEDURE — 97161 PT EVAL LOW COMPLEX 20 MIN: CPT

## 2022-05-09 PROCEDURE — 99232 SBSQ HOSP IP/OBS MODERATE 35: CPT | Performed by: INTERNAL MEDICINE

## 2022-05-09 PROCEDURE — 74018 RADEX ABDOMEN 1 VIEW: CPT

## 2022-05-09 PROCEDURE — 97165 OT EVAL LOW COMPLEX 30 MIN: CPT

## 2022-05-09 PROCEDURE — 6370000000 HC RX 637 (ALT 250 FOR IP): Performed by: STUDENT IN AN ORGANIZED HEALTH CARE EDUCATION/TRAINING PROGRAM

## 2022-05-09 PROCEDURE — 6360000002 HC RX W HCPCS: Performed by: STUDENT IN AN ORGANIZED HEALTH CARE EDUCATION/TRAINING PROGRAM

## 2022-05-09 RX ORDER — DOCUSATE SODIUM 100 MG/1
100 CAPSULE, LIQUID FILLED ORAL DAILY PRN
Qty: 10 CAPSULE | Refills: 1 | Status: CANCELLED | OUTPATIENT
Start: 2022-05-09 | End: 2022-05-10

## 2022-05-09 RX ORDER — LACTULOSE 10 G/15ML
20 SOLUTION ORAL 3 TIMES DAILY
Status: DISCONTINUED | OUTPATIENT
Start: 2022-05-09 | End: 2022-05-10 | Stop reason: HOSPADM

## 2022-05-09 RX ADMIN — ENOXAPARIN SODIUM 30 MG: 100 INJECTION SUBCUTANEOUS at 08:48

## 2022-05-09 RX ADMIN — Medication 45 MG: at 17:08

## 2022-05-09 RX ADMIN — ENOXAPARIN SODIUM 30 MG: 100 INJECTION SUBCUTANEOUS at 19:43

## 2022-05-09 RX ADMIN — POLYETHYLENE GLYCOL 3350 17 G: 17 POWDER, FOR SOLUTION ORAL at 08:48

## 2022-05-09 RX ADMIN — PANTOPRAZOLE SODIUM 40 MG: 40 TABLET, DELAYED RELEASE ORAL at 06:15

## 2022-05-09 RX ADMIN — CEFDINIR 300 MG: 300 CAPSULE ORAL at 19:42

## 2022-05-09 RX ADMIN — Medication 155 MG: at 08:47

## 2022-05-09 RX ADMIN — SENNOSIDES 17.2 MG: 8.6 TABLET, COATED ORAL at 08:49

## 2022-05-09 RX ADMIN — QUETIAPINE FUMARATE 100 MG: 100 TABLET ORAL at 19:42

## 2022-05-09 RX ADMIN — SENNOSIDES 17.2 MG: 8.6 TABLET, COATED ORAL at 19:42

## 2022-05-09 RX ADMIN — CEFDINIR 300 MG: 300 CAPSULE ORAL at 08:49

## 2022-05-09 RX ADMIN — DOCUSATE SODIUM 100 MG: 100 CAPSULE, LIQUID FILLED ORAL at 08:49

## 2022-05-09 RX ADMIN — QUETIAPINE FUMARATE 100 MG: 100 TABLET ORAL at 08:49

## 2022-05-09 ASSESSMENT — PAIN SCALES - GENERAL
PAINLEVEL_OUTOF10: 9
PAINLEVEL_OUTOF10: 0
PAINLEVEL_OUTOF10: 0

## 2022-05-09 NOTE — PLAN OF CARE
KUB results back do not indicate obstruction but show moderate stool burden. Spoke to [employer] over the phone to discuss discharge planning. Given this patient's very high dose of methadone and constipation of 7 days duration and multiple failed bowel regimens at this time, risk of perforation is too great for safe discharge. Patient remains symptomatic with abdominal pain. Trial of lactulose today, will monitor for bowel movement.     Electronically signed by Jamal Bowles MD on 5/9/2022 at 2:43 PM

## 2022-05-09 NOTE — PROGRESS NOTES
[address]  [address] [place] [address]      Date:2022                 Patient Name: Obed Bal  MRN: [id number]  : 1990  Room: [address] [place] [address]-    Referring Provider: Amy Ellis MD  Specific Provider Orders/Date: OT evaluation and treat 22    Evaluating OT: Jessica Strickland. Jacque, OTR/L #[id number]    Diagnosis: Bradycardia [R00.1]  Chest pain, unspecified type [R07.9]  Vomiting [R11.10]  Abdominal pain [R10.9]      Surgery: History reviewed. No pertinent surgical history. Pertinent Medical History:  has a past medical history of Abnormal Pap smear of cervix, Anemia, Drug abuse ([facility] [address] Obesity, Overdose, and Preeclampsia complicating hypertension. Precautions:  Fall Risk      OT PLAN OF CARE   OT POC based on physician orders, patient diagnosis and results of clinical assessment    Home Living: Pt lives with one year old daughter in a one story home with 2 steps and one hand rails. Bed and bath on main  floor with ramped entry. Bathroom setup: tub shower with HR and standard commode   Equipment owned: none    Prior Level of Function: Independent with ADLs , Independent with IADLs; ambulated   Driving: no takes taxi's  Occupation: unemployed  Medication management: self  Leisure: enjoys time with one year old baby    Pain Level: no pain    Cognition: A&O: 4/4; Follows multi step directions   Memory:  good   Sequencing:  good   Problem solving:  Good- spoke about leaving AMA - encouraged to stay until d/c'd   Judgement/safety:  Good-     Functional Assessment:  AM-PAC Daily Activity Raw Score: 24/24   Initial Eval Status  Date: 22     Feeding Independent     Grooming Independent     UB Dressing Independent     LB Dressing Independent donned socks     Bathing Independent     Toileting Independent simulated     Bed Mobility  Supine to sit:  Independent  Sit to supine: Independent     Functional Transfers Independent no AD     Functional Mobility Independent     Balance Sitting:     Static:  good    Dynamic:good  Standing: good     Activity Tolerance Good-      Visual/  Perceptual                         intact         Safety good       Hand Dominance R   AROM (PROM) Strength Additional Info:    RUE  WFL 4+/5 good  and wfl FMC/dexterity noted during ADL tasks       LUE WFL 4+/5 Good-  and wfl FMC/dexterity noted during ADL tasks     Hearing: [facility]   Sensation:  Decreased L foot otherwise No c/o numbness or tingling   Tone: WFL   Edema: none noted    Comments: Upon arrival patient supine and agreeable to evaluation. Performed OT evaluation with education on safety with ADL completion and importance of following medical advice. At end of session, patient sitting up in bed and  with call light and phone within reach, all lines and tubes intact. Nursing notified. Overall patient demonstrated no decreased independence and safety during completion of ADL/functional transfer/mobility tasks. No OT follow up treatment indicated. Patient and/or family were instructed on functional diagnosis, prognosis/goals and OT plan of care. Demonstrated low understanding. Eval Complexity: low    Time In: 9:00  Time Out: 9:12  Total Treatment Time: 0      Min Units   OT Eval Low 93498  X     OT Eval Medium 02044      OT Eval High 96623       OT Re-Eval [id number]       Therapeutic Ex [id number]       Therapeutic Activities 79166       ADL/Self Care 29232       Orthotic Management 35962       Neuro Re-Ed 96153       Non-Billable Time          Evaluation Time includes thorough review of current medical information, gathering information on past medical history/social history and prior level of function, completion of standardized testing/informal observation of tasks, assessment of data and education on plan of care and goals. William Seymour.  Mel[address]

## 2022-05-09 NOTE — CARE COORDINATION
Plan remains home at discharge. No needs noted. She is completely independent with therapy at this time. Patients mother called and spoke with nursing she verified she is caring for patients child at home. She is anxious for patient to discharge and come home. If patient does not get a ride home, arrangements can be made through her insurance [phone].

## 2022-05-09 NOTE — PROGRESS NOTES
[facility]  Internal Medicine Residency Program  Progress Note - House Team     Patient:  Gabriela Garcia 36 y.o. female MRN: [id number]     Date of Service: 5/9/2022     CC: nausea, diaphoresis, chest pain  Overnight events: no acute events    Subjective     Patient was seen and examined this morning at bedside in no acute distress. Overnight no acute events. This morning, patient still states she has not had a bowel movement but is passing flatus. She still complains of general abdominal pain. Objective     Physical Exam:  Vitals: /69   Pulse 82   Temp 97.1 °F (36.2 °C) (Temporal)   Resp 16   Ht 5' 4\" (1.626 m)   Wt 287 lb 3.2 oz (130.3 kg)   SpO2 98%   BMI 49.30 kg/m²     I & O - 24hr: No intake/output data recorded. General Appearance: alert and cooperative  HEENT:  Head: Normocephalic, no lesions, without obvious abnormality. Neck: no JVD  Lung: clear to auscultation bilaterally  Heart: regular rate and rhythm, S1, S2 normal, no murmur, click, rub or gallop  Abdomen: soft. Diffusely tender to palpation. hypoactive bowel sounds. Extremities:  extremities normal, atraumatic, no cyanosis or edema  Musculokeletal: No joint swelling, no muscle tenderness. ROM normal in all joints of extremities.    Neurologic: Mental status: Alert, oriented, thought content appropriate  Subject  Pertinent Labs & Imaging Studies   dean  CBC with Differential:    Lab Results   Component Value Date    WBC 7.1 05/07/2022    RBC 4.58 05/07/2022    HGB 14.6 05/07/2022    HCT 42.1 05/07/2022     05/07/2022    MCV 91.9 05/07/2022    MCH 31.9 05/07/2022    MCHC 34.7 05/07/2022    RDW 13.9 05/07/2022    LYMPHOPCT 32.1 05/07/2022    MONOPCT 12.2 05/07/2022    BASOPCT 0.3 05/07/2022    MONOSABS 0.87 05/07/2022    LYMPHSABS 2.29 05/07/2022    EOSABS 0.24 05/07/2022    BASOSABS 0.02 05/07/2022     BMP:    Lab Results   Component Value Date     05/07/2022    K 3.7 05/07/2022    K 3.8 05/02/2022     05/07/2022    CO2 21 05/07/2022    BUN 9 05/07/2022    LABALBU 3.9 05/02/2022    CREATININE 0.9 05/07/2022    CALCIUM 9.5 05/07/2022    GFRAA >60 05/07/2022    LABGLOM >60 05/07/2022    GLUCOSE 90 05/07/2022     Magnesium:    Lab Results   Component Value Date    MG 2.2 05/07/2022     Phosphorus:  No results found for: PHOS    XR ABDOMEN (2 VIEWS)   Final Result   Unremarkable bowel gas pattern, with no evidence of obstruction or free air. Moderate fecal retention. CTA PULMONARY W CONTRAST   Final Result   1. Minimal dependent atelectasis seen within the right lung base posteriorly   and trace right pleural effusion. 2. There is no pulmonary embolus              Resident's Assessment and Plan   Resolved issues:  Sinus bradycardia 2/2 medication (AV yennifer blocking agents)  HTN urgency      Assessment and Plan:    UTI  UA: Small leukocyte Estrace, moderate bacteria, WBC 2-5  FOBT: Negative  Urine culture grew E.  Coli  Cefdinir until 5/11/22  Constipation in the setting of methadone use  KUB negative for obstruction x 2  Has tried dulcolax suppository, colace, naloxegol, glycolax, senokot this admission  Trial of lactulose today  Hx of polysubstance abuse  UDS positive for cocaine and methadone  Continue methadone 155mg qd and 45mg qhs  Hx of mood disorder  Continue seroquel 100mg BID      PT/OT evaluation: consulted  DVT prophylaxis/ GI prophylaxis: lovenox 30mg BID, protonix 40mg qd  Disposition: continue current care    Ole MD Navi, PGY-1  Attending physician: Dr. Ana Preston

## 2022-05-09 NOTE — PROGRESS NOTES
Room #:  1441/1638-N  Patient Name: Lior Paredes  Referring Provider:   22 0745  PT eval and treat           Isabela Joaquin MD        PHYSICAL THERAPY INITIAL EVALUATION    Plan of care: No skilled needs identified; will discharge from services. If condition changes, please reorder physical therapy. Placement recommendation: home   Equipment recommendation:  None        AM-PAC Basic Mobility             Order:  EVALUATE AND TREAT  Diagnosis/Problem list:      Patient Active Problem List   Diagnosis    Encounter for fetal anatomic survey    Suspected shortening of cervix not found    Tenosynovitis    28 weeks gestation of pregnancy    Nausea vomiting and diarrhea    Normal pregnancy, antepartum    Encounter for  screening for malformation using ultrasound    Supervision of high risk pregnancy in third trimester    Abdominal pain affecting pregnancy    38 weeks gestation of pregnancy    Preeclampsia complicating hypertension    Bradycardia    Vomiting    Abdominal pain       Past medical history:       Diagnosis Date    Abnormal Pap smear of cervix     Anemia     Drug abuse ([address]     Obesity     Overdose     multiple    Preeclampsia complicating hypertension 2021   ; History reviewed. No pertinent surgical history. The admitting diagnosis and active problem list as listed above have been reviewed prior to the initiation of this evaluation. Precautions:  , FWB (full weight bearing)  Social history: Patient lives with daughter  in a ranch home  with 2 steps to enter with Rail     Prior Level of Function: Patient ambulated independently   Equipment owned: None,      Mentation: alert, cooperative, oriented x 3 and follows directions,     ROM: wfl   STRENGTH: wfl   PAIN: (measured on a visual analog scale with 0=no pain and 10=excruciating pain) 0/10.      FUNCTIONAL ASSESSMENT   Bed Mobility- Supine to sit- Independent      Scooting-Independent     Sit to supine- Independent    Transfers-Sit to stand-  Independent     Gait:  Patient ambulated 125 feet using no device with  Independent     Steps:  Not assessed      Balance: sit-good       stand good     Edema: \nobilateral lower extremities and bilateral upper extremities  Endurance: good     Treatment: Therapist educated and facilitated patient on techniques to increase safety and independence with bed mobility, balance, functional transfers, and functional mobility. Sat EOB x 5 minutes to increase dynamic sitting balance and activity tolerance. Patient ambulated 125ft Ind. Patient demonstrating good understanding of education/techniques. At end of session, patient in bed with  call light and phone within reach,  all lines and tubes intact, nursing notified. Rehab Potential: good   Patients Goal: home    ASSESSMENT  No PT needs at this time. Nursing to ambulate patient every shift.        Evaluation Complexity: Low Complexity PT evaluation ([id number])     Time in: 0968  Time out: 0915    CPT codes:  No treatment      Colette Hermelinda, SPT  Nicole Dana, PT

## 2022-05-09 NOTE — PLAN OF CARE
PS received regarding patient leaving AMA. Went down to talk to Ms. David Yanez. She is worried about her baby which is her concern for leaving. She states she will stay for tonight. She had refused lactulose earlier because she thought it was Milk of Magnesia which she does not like. I explained the difference between the medications and how lactulose is different from the other bowel regimen meds that have been offered to her this admission, and that it will help stimulate her intestines to make the passage of stool easier. This relieved the patient's concerns about having to strain for a BM. She states she will take the first dose of lactulose now and try for a bowel movement afterwards. Patient discouraged from manual disimpaction and counseled that if she performs it on herself she may cause damage and put herself at risk of infection.      Electronically signed by Kate Bernheim, MD on 5/9/2022 at 3:19 PM

## 2022-05-10 VITALS
SYSTOLIC BLOOD PRESSURE: 132 MMHG | RESPIRATION RATE: 16 BRPM | OXYGEN SATURATION: 98 % | HEIGHT: 64 IN | BODY MASS INDEX: 49.03 KG/M2 | TEMPERATURE: 96.7 F | WEIGHT: 287.2 LBS | HEART RATE: 75 BPM | DIASTOLIC BLOOD PRESSURE: 84 MMHG

## 2022-05-10 PROBLEM — F19.10 POLYSUBSTANCE ABUSE (HCC): Status: ACTIVE | Noted: 2022-05-10

## 2022-05-10 PROBLEM — N30.00 ACUTE CYSTITIS WITHOUT HEMATURIA: Status: ACTIVE | Noted: 2022-05-10

## 2022-05-10 PROBLEM — K59.03 DRUG-INDUCED CONSTIPATION: Status: ACTIVE | Noted: 2022-05-10

## 2022-05-10 PROCEDURE — 99238 HOSP IP/OBS DSCHRG MGMT 30/<: CPT | Performed by: INTERNAL MEDICINE

## 2022-05-10 PROCEDURE — 6370000000 HC RX 637 (ALT 250 FOR IP): Performed by: STUDENT IN AN ORGANIZED HEALTH CARE EDUCATION/TRAINING PROGRAM

## 2022-05-10 PROCEDURE — 6370000000 HC RX 637 (ALT 250 FOR IP): Performed by: INTERNAL MEDICINE

## 2022-05-10 PROCEDURE — 6360000002 HC RX W HCPCS: Performed by: STUDENT IN AN ORGANIZED HEALTH CARE EDUCATION/TRAINING PROGRAM

## 2022-05-10 RX ORDER — CEFDINIR 300 MG/1
300 CAPSULE ORAL EVERY 12 HOURS SCHEDULED
Qty: 2 CAPSULE | Refills: 0 | Status: SHIPPED | OUTPATIENT
Start: 2022-05-10 | End: 2022-05-10

## 2022-05-10 RX ORDER — METHADONE HYDROCHLORIDE 10 MG/1
40 TABLET ORAL ONCE
Qty: 4 TABLET | Refills: 0 | Status: SHIPPED | OUTPATIENT
Start: 2022-05-10 | End: 2022-05-10

## 2022-05-10 RX ORDER — DOCUSATE SODIUM 100 MG/1
100 CAPSULE, LIQUID FILLED ORAL 2 TIMES DAILY PRN
Qty: 60 CAPSULE | Refills: 0 | Status: SHIPPED | OUTPATIENT
Start: 2022-05-10 | End: 2022-06-10

## 2022-05-10 RX ORDER — CEFDINIR 300 MG/1
300 CAPSULE ORAL EVERY 12 HOURS SCHEDULED
Qty: 3 CAPSULE | Refills: 0 | Status: SHIPPED | OUTPATIENT
Start: 2022-05-10 | End: 2022-05-12

## 2022-05-10 RX ORDER — POLYETHYLENE GLYCOL 3350 17 G/17G
17 POWDER, FOR SOLUTION ORAL ONCE
Status: DISCONTINUED | OUTPATIENT
Start: 2022-05-10 | End: 2022-05-10 | Stop reason: HOSPADM

## 2022-05-10 RX ORDER — QUETIAPINE FUMARATE 100 MG/1
100 TABLET, FILM COATED ORAL 2 TIMES DAILY
Qty: 60 TABLET | Refills: 0 | Status: SHIPPED | OUTPATIENT
Start: 2022-05-10

## 2022-05-10 RX ORDER — DOCUSATE SODIUM 100 MG/1
100 CAPSULE, LIQUID FILLED ORAL ONCE
Status: DISCONTINUED | OUTPATIENT
Start: 2022-05-10 | End: 2022-05-10 | Stop reason: HOSPADM

## 2022-05-10 RX ORDER — POLYETHYLENE GLYCOL 3350 17 G/17G
34 POWDER, FOR SOLUTION ORAL DAILY
Status: DISCONTINUED | OUTPATIENT
Start: 2022-05-11 | End: 2022-05-10 | Stop reason: HOSPADM

## 2022-05-10 RX ORDER — DOCUSATE SODIUM 100 MG/1
200 CAPSULE, LIQUID FILLED ORAL 2 TIMES DAILY
Status: DISCONTINUED | OUTPATIENT
Start: 2022-05-10 | End: 2022-05-10 | Stop reason: HOSPADM

## 2022-05-10 RX ADMIN — Medication 155 MG: at 08:10

## 2022-05-10 RX ADMIN — LACTULOSE 20 G: 20 SOLUTION ORAL at 08:12

## 2022-05-10 RX ADMIN — QUETIAPINE FUMARATE 100 MG: 100 TABLET ORAL at 08:12

## 2022-05-10 RX ADMIN — DOCUSATE SODIUM 100 MG: 100 CAPSULE, LIQUID FILLED ORAL at 08:12

## 2022-05-10 RX ADMIN — POLYETHYLENE GLYCOL 3350 17 G: 17 POWDER, FOR SOLUTION ORAL at 08:11

## 2022-05-10 RX ADMIN — SENNOSIDES 17.2 MG: 8.6 TABLET, COATED ORAL at 08:11

## 2022-05-10 RX ADMIN — CEFDINIR 300 MG: 300 CAPSULE ORAL at 08:12

## 2022-05-10 RX ADMIN — PANTOPRAZOLE SODIUM 40 MG: 40 TABLET, DELAYED RELEASE ORAL at 06:03

## 2022-05-10 RX ADMIN — ENOXAPARIN SODIUM 30 MG: 100 INJECTION SUBCUTANEOUS at 08:11

## 2022-05-10 ASSESSMENT — PAIN SCALES - GENERAL
PAINLEVEL_OUTOF10: 10
PAINLEVEL_OUTOF10: 10

## 2022-05-10 NOTE — DISCHARGE SUMMARY
[address]  Discharge Summary    PCP: No primary care provider on file. [facility] POVP:7/6/7230  Discharge Date: 5/10/2022    Admission Diagnosis:    1. Sinus bradycardia 2/2 medication (AV yennifer blocking agents) - RESOLVED  2. HTN urgency - RESOLVED  3. UTI- RESOLVED   4. Constipation in the setting of methadone use - RESOLVED  5. Polysubstance abuse   6. Mood disorder  7. HTN    Discharge Diagnosis:  1. Sinus bradycardia 2/2 medication (AV yennifer blocking agents) - RESOLVED  2. HTN urgency - RESOLVED  3. UTI- RESOLVED   4. Constipation in the setting of methadone use - RESOLVED  5. Polysubstance abuse   6. Mood disorder  7. HTN    Hospital Course: The patient is a 36 y.o. female with a past medical history of polysubstance abuse (cocaine, amphetamine, tobacco), obesity, preeclampsia, multiple drug overdose, mood disorder presented with CC of nausea diaphoresis and chest pain since last night. Chest pain was sudden in onset, constant, with intensity rating from 5/10-8/10, nonradiating, pressure-like in character, not associated with exertion and not better with rest.  Chest pain continued throughout the night and patient had an near syncopal episode in the morning. EMS was called and found to have bradycardia with HR 30s resulting in the ED visit. Patient also reports having 1 episode of dark brown vomiting. She also reports having 1 episode of black stool 2 days back. Patient denies any difficulty breathing, palpitations, blurry vision, sick contacts, cough, abdominal pain, back pain, urinary complaints.     Patient has history of polysubstance abuse and reports using cocaine 2 days back. She is on methadone(155 mg in the morning/45 mg in evening) treatment from [place]. Patient also takes clonidine. Patient is an active smoker with 13-pack-year smoking history.   Patient denies any other recent drug abuse.     In ED: Patient found to be hypertensive with SBP 160- 220 mmHg, HR 30s, SPO2 90 to 97% on room air  Labs: BMP unremarkable except K3.8, troponin <6, , ALT 47, AST 51, TSH 1.34, total T4 4.3  SDS negative, urine drug screen positive for methadone and cocaine  EKG: Sinus bradycardia without ST segment elevation  CTA chest: Negative for PE     EP was consulted and as per recommendations methadone and clonidine (AV yennifer blocking agents) was held. Patient received 1 dose of atropine 0.5 mg IV once, which resulted in resolution of bradycardia with HR in 90s. Patient did have acute increase in blood pressure to 220/129 mm Hg. hydralazine 10 mg IV q. 6 hourly as needed was ordered. Patient was admitted to internal medicine house team 2 for further management and treatment. Patient remained in NSR for rest of admission. Methadone was resumed per her scheduled clinic doses which were verified with [facility]. Patient being discharged in the afternoon so she is being discharged with script for one dose of methadone which can be given by [facility] on second floor as verified with their staff. Patient agrees with this plan and that she will return to [facility] tomorrow for her scheduled methadone. Patient is being discharged without clonidine to avoid bradycardia. She was initially prescribed this medication for withdrawal symptoms and not for blood pressure management. She has been without clonidine for 8 days during hospital stay and BP has been well-controlled and patient has not exhibited any symptoms of opiate withdrawal.    Patient's hospital stay was complicated by constipation refractory to multiple stool softeners and suppository. She agreed to take lactulose on her final day and was able to have a medium sized BM per bedside RN. She is being discharged on colace per patient request.     For her UTI, all symptoms have resolved. She is discharged with script for cefdinir through 5/11/22 to complete her course of 7 day treatment. Significant findings (history and exam, laboratory, radiological, pathology, other tests):   · General Appearance: alert and cooperative  · HEENT:  Head: Normocephalic, no lesions, without obvious abnormality. · Neck: no JVD  · Lung: clear to auscultation bilaterally  · Heart: regular rate and rhythm, S1, S2 normal, no murmur, click, rub or gallop  · Abdomen: soft. tender to palpation of inferior abdomen. Normal bowel sounds heard. · Extremities:  extremities normal, atraumatic, no cyanosis or edema  · Musculokeletal: No joint swelling, no muscle tenderness. ROM normal in all joints of extremities. · Neurologic: Mental status: Alert, oriented, thought content appropriate    Pending test results:   1. None    Consults:  1. EPS    Procedures:  1. None    Condition at discharge: Stable    Disposition: home    Outpatient Recommendations:  1. Continued use and trial of stool softeners and agents such as movantik    Discharge Medications:     Medication List      START taking these medications    cefdinir 300 MG capsule  Commonly known as: OMNICEF  Take 1 capsule by mouth every 12 hours for 2 days     docusate sodium 100 MG capsule  Commonly known as: COLACE  Take 1 capsule by mouth 2 times daily as needed for Constipation        CHANGE how you take these medications    * methadone 10 MG/ML solution  Commonly known as: DOLOPHINE  What changed: Another medication with the same name was added. Make sure you understand how and when to take each. * methadone 10 MG/ML solution  Commonly known as: DOLOPHINE  What changed: Another medication with the same name was added. Make sure you understand how and when to take each. * methadone 10 MG tablet  Commonly known as: Dolophine  Take 4 tablets by mouth once for 1 dose. What changed: You were already taking a medication with the same name, and this prescription was added. Make sure you understand how and when to take each.          * This list has 3 medication(s) that are the same as other medications prescribed for you. Read the directions carefully, and ask your doctor or other care provider to review them with you. CONTINUE taking these medications    acetaminophen 500 MG tablet  Commonly known as: TYLENOL  Take 1 tablet by mouth 4 times daily as needed for Pain     QUEtiapine 100 MG tablet  Commonly known as: SEROQUEL  Take 1 tablet by mouth 2 times daily        STOP taking these medications    cloNIDine 0.2 MG tablet  Commonly known as: CATAPRES     ketorolac 10 MG tablet  Commonly known as: TORADOL           Where to Get Your Medications      These medications were sent to [address], [address]  [facility] [place] [address]    Phone: [phone]   · acetaminophen 500 MG tablet     You can get these medications from any pharmacy    Bring a paper prescription for each of these medications  · cefdinir 300 MG capsule  · docusate sodium 100 MG capsule  · methadone 10 MG tablet  · QUEtiapine 100 MG tablet          Discharge to:  Home  Diet: common adult  Activity: activity as tolerated   Exercise: As tolerated     · Be compliant with medication. · Please continue to take your home medications as before. · We strongly recommend you to cut down on tablet clonidine as it might be contributing to slow heart rate. Please continue to take tablet clonidine 0.2 mg twice a day x 7 days, followed by 0.2 mg daily x 7 days, followed by half tablet of clonidine 0.2 mg daily x 7 days  · Please refrain from using illicit drugs. You can always reach out to the  at our clinic for getting help to refrain from the illicit drug use  · Please continue to take regular balanced diet, regular exercise and refrain from high fat/sugar diet. Follow up:    Follow-up With  Details  Why  [facility] Emergency Department  Go on 5/11/2022  Hospital follow up at [address] MD  PGY-1   2:14 PM 5/10/2022

## 2022-05-10 NOTE — PROGRESS NOTES
She is in the bathroom currently and she had a small to medium formed bowel movement . No blood noted . Message sent to Dr. Stacia Cervantes. Also Hallie Clarke wants to see you and she needs scripts and wants to go home .  Message sent to Dr. Stacia Cervantes

## 2022-05-10 NOTE — PROGRESS NOTES
She is refusing the Mag Citrate and refusing for me to remove her impaction . Message sent to Dr. Deanna Persaud .

## 2022-05-10 NOTE — PROGRESS NOTES
CLINICAL PHARMACY NOTE: MEDS TO BEDS    Total # of Prescriptions Filled: 3   The following medications were delivered to the patient:  · Docusate 100  · Cefdinir 300  · Quetiapine 100    Additional Documentation:    Pt didn't want methadone due to having cost and needing a prior auth

## 2022-05-10 NOTE — PLAN OF CARE
PS received by Inez Padilla in pharmacy that patient's methadone required prior authorization and that patient did not want to pay cash price for methadone of $12 and that the patient left and did not want to wait.     Electronically signed by Ramy Menjiavr MD on 5/10/2022 at 3:44 PM

## 2022-05-10 NOTE — PROGRESS NOTES
Discharge instructions given to patient and scripts given and verbalizes understanding and has no questions ,

## 2022-05-10 NOTE — PROGRESS NOTES
[facility]  Internal Medicine Residency / [address]    Attending Physician Statement  I have discussed the case, including pertinent history and exam findings with the resident and the team.  I have seen and examined the patient, reviewed meds and pertinent labs and the key elements of the encounter have been performed by me. I agree with the assessment, plan and orders as documented by the resident. Patient continues to c/o abdominal discomfort and lack of BM. She has refused lactulose and disimpaction. Her abdomen is soft and she has mild tenderness with gentle deep palpation. There is no guarding or rebound. Bowel sounds are present. Would try magnesium citrate. Remainder of medical problems as per resident note.       Marisol Melendrez DO  Internal Medicine Residency Faculty

## 2022-05-28 ENCOUNTER — HOSPITAL ENCOUNTER (EMERGENCY)
Age: 32
Discharge: HOME | End: 2022-05-28
Payer: MEDICAID

## 2022-05-28 VITALS
HEART RATE: 67 BPM | TEMPERATURE: 98 F | OXYGEN SATURATION: 100 % | SYSTOLIC BLOOD PRESSURE: 124 MMHG | RESPIRATION RATE: 18 BRPM | DIASTOLIC BLOOD PRESSURE: 95 MMHG | BODY MASS INDEX: 49 KG/M2 | HEIGHT: 64 IN | WEIGHT: 287 LBS

## 2022-05-28 DIAGNOSIS — Z76.0 ENCOUNTER FOR MEDICATION REFILL: Primary | ICD-10-CM

## 2022-05-28 PROCEDURE — 99283 EMERGENCY DEPT VISIT LOW MDM: CPT

## 2022-05-28 PROCEDURE — 6370000000 HC RX 637 (ALT 250 FOR IP): Performed by: PHYSICIAN ASSISTANT

## 2022-05-28 RX ORDER — METHADONE HYDROCHLORIDE 10 MG/ML
160 CONCENTRATE ORAL ONCE
Status: COMPLETED | OUTPATIENT
Start: 2022-05-28 | End: 2022-05-28

## 2022-05-28 RX ADMIN — Medication 160 MG: at 13:04

## 2022-05-28 NOTE — ED PROVIDER NOTES
[address]  Department of Emergency Medicine   ED  Encounter Note  Admit Date/RoomTime: 2022 11:06 AM  ED Room: CHAIR01/    NAME: Sofia Hutson  : 1990  MRN: [id number]     Chief Complaint:  Medication Refill (pt requesting methadone, missed dose today)    History of Present Illness      Sofia Hutson is a 36 y.o. old female presents to the emergency department via by private vehicle requesting medication(s) as result of missing her dose of methadone this morning. Patient states that she was supposed to be there by 9:00 and missed going and therefore missed her dose of methadone today as well as the weekend. Patient states she is here to get dosed. Patient states is having no symptoms at all. Patient has no pain or radiation of any pain. Nothing seems to make it better or worse except for her methadone. Patient states \"I just need my medication so I can take care of my babies. \". Patient states she was at [facility] and then was switched to [place]. Patient states she is now on the dose 160 mg. Patient states she previously was on 155+45 mg and has now been changed to 160 mg daily 1 dose    ROS   Pertinent positives and negatives are stated within HPI, all other systems reviewed and are negative. Past Medical History:  has a past medical history of Abnormal Pap smear of cervix, Anemia, Drug abuse ([address] Obesity, Overdose, and Preeclampsia complicating hypertension. Surgical History:  has no past surgical history on file. Social History:  reports that she has been smoking cigarettes. She has a 6.50 pack-year smoking history. She has never used smokeless tobacco. She reports previous drug use. Drug: Methamphetamines (Crystal Meth). She reports that she does not drink alcohol. Family History: family history is not on file. Allergies: Patient has no known allergies.     Physical Exam   Oxygen Saturation Interpretation: Normal.        ED Triage Vitals   BP Temp Temp src Heart Rate Resp SpO2 Height Weight   05/28/22 1115 05/28/22 1103 -- 05/28/22 1103 05/28/22 1103 05/28/22 1103 05/28/22 1103 05/28/22 1103   (!) 124/95 98 °F (36.7 °C)  67 18 100 % 5' 4\" (1.626 m) 287 lb (130.2 kg)         Constitutional:  Alert, development consistent with age. HEENT:  NC/NT. Airway patent. Neck:  Normal ROM. Supple. Respiratory:  Clear to auscultation and breath sounds equal.    CV: Regular rate and rhythm, normal heart sounds, without pathological murmurs, ectopy, gallops, or rubs. GI:  Abdomen Soft, nontender, good bowel sounds. No firm or pulsatile mass. Integument:  No rashes, erythema present. Lymphatics: No lymphangitis or adenopathy noted. Neurological:  Oriented. Motor functions intact. Lab / Imaging Results   (All laboratory and radiology results have been personally reviewed by myself)  Labs:  No results found for this visit on 05/28/22. Imaging: All Radiology results interpreted by Radiologist unless otherwise noted. No orders to display       ED Course / Medical Decision Making     Medications   methadone (DOLOPHINE) 10 MG/ML solution 160 mg (160 mg Oral Given 5/28/22 1304)        Consults:   None    Counseling/MDM:   Patient is a 82-year-old female that is presenting today due to missed methadone dose. Patient states that she is on 160 mg through [place]. Patient states she was supposed to be there by 9:00 to dose and missed it. Patient is having no symptoms currently and is just here for medication refill. Verified with pharmacy that they do carry the methadone and only 1 dose will be dispensed here. Patient was educated that we will not dispense any medication for her to go home with. Patient voiced understanding and agreed. Patient was explained that she is more than welcome to come back here until Tuesday to get her dose if needed.   Patient states she fully understands and will follow up accordingly. Patient is having no headaches, blurry vision, chest pain, shortness of breath, fever, chills, nausea, vomiting, severe abdominal pain, change in bowel or bladder movements or any numbness or weakness bilaterally in her extremities. Patient is stable for outpatient follow-up. Patient is of sound mind to make decisions and alert and oriented and has no suicidal homicidal ideations. Andrez Adamson PA-C  have spoken with the patient and discussed todays emergency visit, in addition to providing specific details for the plan of care and counseling regarding the diagnosis and prognosis. She was counseled on the role of the emergency department regarding prescribing medications for chronic conditions including Narcotic and other controlled substances. Based on the presenting complaint and nature of illness, the requested medications will be provided today in prescription form and she is instructed to contact their prescribing provider for any/all supplemental medications as soon as possible. Questions are answered at this time and is agreeable with the plan. Assessment      1. Encounter for medication refill      Plan   Discharged home. Patient condition is stable    New Medications     New Prescriptions    No medications on file     Electronically signed by Andrez Adamson PA-C   DD: 5/28/22  **This report was transcribed using voice recognition software. Every effort was made to ensure accuracy; however, inadvertent computerized transcription errors may be present.   END OF ED PROVIDER NOTE         Andrez Adamson PA-C  05/28/22 [address] Shook PA-C  05/28/22 [id number]

## 2022-05-29 ENCOUNTER — HOSPITAL ENCOUNTER (EMERGENCY)
Age: 32
Discharge: HOME | End: 2022-05-29
Payer: MEDICAID

## 2022-05-29 VITALS
HEART RATE: 80 BPM | DIASTOLIC BLOOD PRESSURE: 86 MMHG | RESPIRATION RATE: 20 BRPM | OXYGEN SATURATION: 100 % | SYSTOLIC BLOOD PRESSURE: 132 MMHG | TEMPERATURE: 98 F

## 2022-05-29 DIAGNOSIS — Z76.0 ENCOUNTER FOR MEDICATION REFILL: Primary | ICD-10-CM

## 2022-05-29 PROCEDURE — 99283 EMERGENCY DEPT VISIT LOW MDM: CPT

## 2022-05-29 PROCEDURE — 6370000000 HC RX 637 (ALT 250 FOR IP): Performed by: PHYSICIAN ASSISTANT

## 2022-05-29 RX ORDER — METHADONE HYDROCHLORIDE 10 MG/ML
160 CONCENTRATE ORAL ONCE
Status: COMPLETED | OUTPATIENT
Start: 2022-05-29 | End: 2022-05-29

## 2022-05-29 RX ADMIN — Medication 160 MG: at 12:11

## 2022-05-29 ASSESSMENT — PAIN - FUNCTIONAL ASSESSMENT: PAIN_FUNCTIONAL_ASSESSMENT: NONE - DENIES PAIN

## 2022-05-30 ENCOUNTER — HOSPITAL ENCOUNTER (EMERGENCY)
Age: 32
Discharge: HOME | End: 2022-05-30
Payer: MEDICAID

## 2022-05-30 VITALS
BODY MASS INDEX: 49.26 KG/M2 | TEMPERATURE: 98.3 F | DIASTOLIC BLOOD PRESSURE: 113 MMHG | SYSTOLIC BLOOD PRESSURE: 167 MMHG | WEIGHT: 287 LBS | HEART RATE: 77 BPM | OXYGEN SATURATION: 99 % | RESPIRATION RATE: 16 BRPM

## 2022-05-30 DIAGNOSIS — Z76.0 ENCOUNTER FOR MEDICATION REFILL: Primary | ICD-10-CM

## 2022-05-30 PROCEDURE — 6370000000 HC RX 637 (ALT 250 FOR IP): Performed by: PHYSICIAN ASSISTANT

## 2022-05-30 PROCEDURE — 99283 EMERGENCY DEPT VISIT LOW MDM: CPT

## 2022-05-30 RX ORDER — METHADONE HYDROCHLORIDE 10 MG/ML
160 CONCENTRATE ORAL ONCE
Status: COMPLETED | OUTPATIENT
Start: 2022-05-30 | End: 2022-05-30

## 2022-05-30 RX ADMIN — Medication 160 MG: at 14:08

## 2022-05-30 ASSESSMENT — PAIN - FUNCTIONAL ASSESSMENT: PAIN_FUNCTIONAL_ASSESSMENT: NONE - DENIES PAIN

## 2022-05-30 ASSESSMENT — PAIN SCALES - GENERAL: PAINLEVEL_OUTOF10: 0

## 2022-05-30 NOTE — ED PROVIDER NOTES
[facility]  Department of Emergency Medicine   ED  Encounter Note  Admit Date/RoomTime: 2022 11:32 AM  ED Room: CHAIR03/C3    NAME: Reginald Hernández  : 1990  MRN: [id number]     Chief Complaint:  Other (Pt needs dose of Methadone)    History of Present Illness      Reginald Hernández is a 36 y.o. old female presents to the emergency department via by private vehicle for her dose of methadone. Patient states she missed her appointment on Friday as unable to go back until Tuesday. Patient's clinic recommended she come to the ER over the weekend to get her dose. Patient denies any complaints at this time. Pt denies anything making it better or worse. Pt currently receives 160mg of [facility]adone [facility] at [facility]. Denies fever/chills, headache, vision change, dizziness, chest pain, dyspnea, abdominal pain, NVD, numbness/weakness. ROS   Pertinent positives and negatives are stated within HPI, all other systems reviewed and are negative. Past Medical History:  has a past medical history of Abnormal Pap smear of cervix, Anemia, Drug abuse ([address] Obesity, Overdose, and Preeclampsia complicating hypertension. Surgical History:  has no past surgical history on file. Social History:  reports that she has been smoking cigarettes. She has a 6.50 pack-year smoking history. She has never used smokeless tobacco. She reports previous drug use. Drug: Methamphetamines (Crystal Meth). She reports that she does not drink alcohol. Family History: family history is not on file. Allergies: Patient has no known allergies. Physical Exam   Oxygen Saturation Interpretation: Normal.        ED Triage Vitals [22 1130]   BP Temp Temp Source Heart Rate Resp SpO2 Height Weight   132/86 98 °F (36.7 °C) Oral 80 20 100 % -- --         Constitutional:  Alert, development consistent with age. HEENT:  NC/NT. Airway patent. Neck:  Normal ROM. Supple. Respiratory:  Clear to auscultation and breath sounds equal.    CV: Regular rate and rhythm, normal heart sounds, without pathological murmurs, ectopy, gallops, or rubs. GI:  Abdomen Soft, nontender, good bowel sounds. No firm or pulsatile mass. Integument:  No rashes, erythema present. Lymphatics: No lymphangitis or adenopathy noted. Neurological:  Oriented. Motor functions intact. Lab / Imaging Results   (All laboratory and radiology results have been personally reviewed by myself)  Labs:  No results found for this visit on 05/29/22. Imaging: All Radiology results interpreted by Radiologist unless otherwise noted. No orders to display       ED Course / Medical Decision Making     Medications   methadone (DOLOPHINE) 10 MG/ML solution 160 mg (160 mg Oral Given 5/29/22 1211)        Consults:   None    Counseling/MDM: Patient presenting for her dose of methadone. Patient is in no acute distress, afebrile, nontoxic appearance. Patient had no complaints at this time. Patient given her dose and recommend follow-up with her clinic. Recommend patient return to the ED with new or worsening of symptoms. Assessment      1. Encounter for medication refill      Plan   Discharged home. Patient condition is stable    New Medications     Discharge Medication List as of 5/29/2022 12:21 PM        Electronically signed by Nikki Mendez PA-C   DD: 5/30/22  **This report was transcribed using voice recognition software. Every effort was made to ensure accuracy; however, inadvertent computerized transcription errors may be present.   END OF ED PROVIDER NOTE     Nikki Mendez PA-C  05/30/22 3210

## 2022-05-30 NOTE — ED PROVIDER NOTES
[facility]  Department of Emergency Medicine   ED  Encounter Note  Admit Date/RoomTime: 2022  1:50 PM  ED Room: [place] [address]/  NAME: Red Mora  : 1990  MRN: [id number]     Chief Complaint:  Medication Refill (needs methadone dose, clinic closed, here yesterday)    [address] [place] [address]        Red Mora is a 36 y.o. female who presents to the ED by private vehicle for her methadone dose. Patient states that she was supposed to show up for her methadone dose at 9:00 and missed it. Patient was seen on Friday for a dose and is back again today. Patient states she is on 160 mg. Patient is currently experiencing no signs or symptoms of withdrawal.  Patient has no trauma. Patient denies any headaches, blurry vision, chest pain, shortness of breath, fever, chills, nausea, vomiting, severe abdominal pain. Patient states methadone makes it better and nothing seems to make it worse except going without it dose. Patient has no other complaints today. ROS   Pertinent positives and negatives are stated within HPI, all other systems reviewed and are negative. Past Medical History:  has a past medical history of Abnormal Pap smear of cervix, Anemia, Drug abuse ([address] Obesity, Overdose, and Preeclampsia complicating hypertension. Surgical History:  has no past surgical history on file. Social History:  reports that she has been smoking cigarettes. She has a 6.50 pack-year smoking history. She has never used smokeless tobacco. She reports previous drug use. Drug: Methamphetamines (Crystal Meth). She reports that she does not drink alcohol. Family History: family history is not on file. Allergies: Patient has no known allergies. PHYSICAL EXAM   Oxygen Saturation Interpretation: Normal on room air analysis.         ED Triage Vitals [22 1326]   BP Temp Temp src Heart Rate Resp SpO2 Height Weight   -- 98.3 °F (36.8 °C) -- 77 -- 99 % -- --         Physical Exam  Constitutional/General: Alert and oriented x3, well appearing, non toxic  HEENT:  NC/NT. PERRLA,  Airway patent. Neck: Supple, full ROM, non tender to palpation in the midline, no stridor, no crepitus, no meningeal signs  Respiratory: Lungs clear to auscultation bilaterally, no wheezes, rales, or rhonchi. Not in respiratory distress  CV:  Regular rate. Regular rhythm. No murmurs, gallops, or rubs. 2+ distal pulses  Chest: No chest wall tenderness  GI:  Abdomen Soft, Non tender, Non distended. +BS. No rebound, guarding, or rigidity. No pulsatile masses. Musculoskeletal: Moves all extremities x 4. Warm and well perfused, no clubbing, cyanosis, or edema. Capillary refill <3 seconds  Integument: skin warm and dry. No rashes. Lymphatic: no lymphadenopathy noted  Neurologic: GCS 15, no focal deficits, symmetric strength 5/5 in the upper and lower extremities bilaterally  Psychiatric: Normal Affect    Lab / Imaging Results   (All laboratory and radiology results have been personally reviewed by myself)  Labs:  No results found for this visit on 05/30/22. Imaging: All Radiology results interpreted by Radiologist unless otherwise noted. No orders to display       ED Course / Medical Decision Making     Medications   methadone (DOLOPHINE) 10 MG/ML solution 160 mg (160 mg Oral Given 5/30/22 1408)       Consult(s):   None    Procedure(s):   none    MDM:   Patient is a 68-year-old female that is presenting today for her methadone dose. Patient was here on Friday for her methadone dose and received 160 mg. Patient states the clinic is closed today and opens again tomorrow. Patient states she currently has no symptoms whatsoever and is just here for her dose. Patient had an upper examination which was found to be negative. Medication was ordered and given. Patient states that she will follow-up to get her dose tomorrow.   Patient currently denying any headaches, blurry vision, chest pain, shortness of breath, fever, chills, nausea, vomiting, severe abdominal pain. Patient was explicitly instructed on specific signs and symptoms on which to return to the emergency room for. Patient was instructed to return to the ER for any new or worsening symptoms. Additional discharge instructions were given verbally. All questions were answered. Patient is comfortable and agreeable with discharge plan. Patient in no acute distress and non-toxic in appearance. Plan of Care/Counseling:  Betty Quintana PA-C reviewed today's visit with the patient in addition to providing specific details for the plan of care and counseling regarding the diagnosis and prognosis. Questions are answered at this time and are agreeable with the plan. ASSESSMENT     1. Encounter for medication refill      PLAN   Discharged home. Patient condition is stable    New Medications     New Prescriptions    No medications on file     Electronically signed by Betty Quintana PA-C   DD: 5/30/22  **This report was transcribed using voice recognition software. Every effort was made to ensure accuracy; however, inadvertent computerized transcription errors may be present.   END OF ED PROVIDER NOTE       Betty Quintana PA-C  05/30/22 [address] Sh[address] PA-C  05/30/22 0129

## 2022-05-30 NOTE — Clinical Note
Chris Ferrer was seen and treated in our emergency department on 5/30/2022. She may return to work on 05/31/2022. If you have any questions or concerns, please don't hesitate to call.       Shila Bourgeois PA-C

## 2022-06-09 ENCOUNTER — HOSPITAL ENCOUNTER (OUTPATIENT)
Age: 32
Setting detail: OBSERVATION
Discharge: HOME | End: 2022-06-12
Attending: STUDENT IN AN ORGANIZED HEALTH CARE EDUCATION/TRAINING PROGRAM | Admitting: INTERNAL MEDICINE
Payer: MEDICAID

## 2022-06-09 ENCOUNTER — APPOINTMENT (OUTPATIENT)
Dept: GENERAL RADIOLOGY | Age: 32
End: 2022-06-09
Payer: MEDICAID

## 2022-06-09 DIAGNOSIS — R07.9 CHEST PAIN, UNSPECIFIED TYPE: ICD-10-CM

## 2022-06-09 DIAGNOSIS — R51.9 INTRACTABLE HEADACHE, UNSPECIFIED CHRONICITY PATTERN, UNSPECIFIED HEADACHE TYPE: Primary | ICD-10-CM

## 2022-06-09 DIAGNOSIS — K80.80 BILIARY CALCULUS OF OTHER SITE WITHOUT OBSTRUCTION: ICD-10-CM

## 2022-06-09 LAB — D DIMER: 340 NG/ML DDU

## 2022-06-09 PROCEDURE — 93005 ELECTROCARDIOGRAM TRACING: CPT | Performed by: PHYSICIAN ASSISTANT

## 2022-06-09 PROCEDURE — 84484 ASSAY OF TROPONIN QUANT: CPT

## 2022-06-09 PROCEDURE — 96374 THER/PROPH/DIAG INJ IV PUSH: CPT

## 2022-06-09 PROCEDURE — 71045 X-RAY EXAM CHEST 1 VIEW: CPT

## 2022-06-09 PROCEDURE — 80053 COMPREHEN METABOLIC PANEL: CPT

## 2022-06-09 PROCEDURE — 85378 FIBRIN DEGRADE SEMIQUANT: CPT

## 2022-06-09 PROCEDURE — 99285 EMERGENCY DEPT VISIT HI MDM: CPT

## 2022-06-09 PROCEDURE — 96375 TX/PRO/DX INJ NEW DRUG ADDON: CPT

## 2022-06-09 PROCEDURE — 87635 SARS-COV-2 COVID-19 AMP PRB: CPT

## 2022-06-09 PROCEDURE — 85025 COMPLETE CBC W/AUTO DIFF WBC: CPT

## 2022-06-09 RX ORDER — PROCHLORPERAZINE EDISYLATE 5 MG/ML
10 INJECTION INTRAMUSCULAR; INTRAVENOUS ONCE
Status: COMPLETED | OUTPATIENT
Start: 2022-06-10 | End: 2022-06-10

## 2022-06-09 RX ORDER — 0.9 % SODIUM CHLORIDE 0.9 %
1000 INTRAVENOUS SOLUTION INTRAVENOUS ONCE
Status: COMPLETED | OUTPATIENT
Start: 2022-06-10 | End: 2022-06-10

## 2022-06-09 RX ORDER — DIPHENHYDRAMINE HYDROCHLORIDE 50 MG/ML
25 INJECTION INTRAMUSCULAR; INTRAVENOUS ONCE
Status: COMPLETED | OUTPATIENT
Start: 2022-06-10 | End: 2022-06-10

## 2022-06-10 ENCOUNTER — APPOINTMENT (OUTPATIENT)
Dept: MRI IMAGING | Age: 32
End: 2022-06-10
Payer: MEDICAID

## 2022-06-10 ENCOUNTER — APPOINTMENT (OUTPATIENT)
Dept: CT IMAGING | Age: 32
End: 2022-06-10
Payer: MEDICAID

## 2022-06-10 PROBLEM — R51.9 INTRACTABLE HEADACHE: Status: ACTIVE | Noted: 2022-06-10

## 2022-06-10 LAB
ACETAMINOPHEN LEVEL: <5 MCG/ML (ref 10–30)
ALBUMIN SERPL-MCNC: 4 G/DL (ref 3.5–5.2)
ALP BLD-CCNC: 117 U/L (ref 35–104)
ALT SERPL-CCNC: 34 U/L (ref 0–32)
ANION GAP SERPL CALCULATED.3IONS-SCNC: 10 MMOL/L (ref 7–16)
ANION GAP SERPL CALCULATED.3IONS-SCNC: 12 MMOL/L (ref 7–16)
AST SERPL-CCNC: 35 U/L (ref 0–31)
BASOPHILS ABSOLUTE: 0 E9/L (ref 0–0.2)
BASOPHILS ABSOLUTE: 0.03 E9/L (ref 0–0.2)
BASOPHILS RELATIVE PERCENT: 0.4 % (ref 0–2)
BASOPHILS RELATIVE PERCENT: 0.4 % (ref 0–2)
BILIRUB SERPL-MCNC: <0.2 MG/DL (ref 0–1.2)
BUN BLDV-MCNC: 6 MG/DL (ref 6–20)
BUN BLDV-MCNC: 7 MG/DL (ref 6–20)
C-REACTIVE PROTEIN: 0.7 MG/DL (ref 0–0.4)
CALCIUM SERPL-MCNC: 8.6 MG/DL (ref 8.6–10.2)
CALCIUM SERPL-MCNC: 9.2 MG/DL (ref 8.6–10.2)
CHLORIDE BLD-SCNC: 102 MMOL/L (ref 98–107)
CHLORIDE BLD-SCNC: 106 MMOL/L (ref 98–107)
CO2: 24 MMOL/L (ref 22–29)
CO2: 24 MMOL/L (ref 22–29)
CREAT SERPL-MCNC: 0.8 MG/DL (ref 0.5–1)
CREAT SERPL-MCNC: 0.8 MG/DL (ref 0.5–1)
EKG ATRIAL RATE: 53 BPM
EKG P AXIS: 28 DEGREES
EKG P-R INTERVAL: 184 MS
EKG Q-T INTERVAL: 490 MS
EKG QRS DURATION: 88 MS
EKG QTC CALCULATION (BAZETT): 459 MS
EKG R AXIS: 56 DEGREES
EKG T AXIS: 51 DEGREES
EKG VENTRICULAR RATE: 53 BPM
EOSINOPHILS ABSOLUTE: 0.14 E9/L (ref 0.05–0.5)
EOSINOPHILS ABSOLUTE: 0.23 E9/L (ref 0.05–0.5)
EOSINOPHILS RELATIVE PERCENT: 1.7 % (ref 0–6)
EOSINOPHILS RELATIVE PERCENT: 3.4 % (ref 0–6)
ETHANOL: <10 MG/DL (ref 0–0.08)
GFR AFRICAN AMERICAN: >60
GFR AFRICAN AMERICAN: >60
GFR NON-AFRICAN AMERICAN: >60 ML/MIN/1.73
GFR NON-AFRICAN AMERICAN: >60 ML/MIN/1.73
GLUCOSE BLD-MCNC: 109 MG/DL (ref 74–99)
GLUCOSE BLD-MCNC: 98 MG/DL (ref 74–99)
HCG, URINE, POC: NEGATIVE
HCT VFR BLD CALC: 34.5 % (ref 34–48)
HCT VFR BLD CALC: 35.5 % (ref 34–48)
HEMOGLOBIN: 11.7 G/DL (ref 11.5–15.5)
HEMOGLOBIN: 11.9 G/DL (ref 11.5–15.5)
IMMATURE GRANULOCYTES #: 0.01 E9/L
IMMATURE GRANULOCYTES %: 0.1 % (ref 0–5)
LYMPHOCYTES ABSOLUTE: 2.19 E9/L (ref 1.5–4)
LYMPHOCYTES ABSOLUTE: 3.36 E9/L (ref 1.5–4)
LYMPHOCYTES RELATIVE PERCENT: 32.7 % (ref 20–42)
LYMPHOCYTES RELATIVE PERCENT: 40.9 % (ref 20–42)
Lab: NORMAL
MCH RBC QN AUTO: 31.3 PG (ref 26–35)
MCH RBC QN AUTO: 31.7 PG (ref 26–35)
MCHC RBC AUTO-ENTMCNC: 33.5 % (ref 32–34.5)
MCHC RBC AUTO-ENTMCNC: 33.9 % (ref 32–34.5)
MCV RBC AUTO: 93.4 FL (ref 80–99.9)
MCV RBC AUTO: 93.5 FL (ref 80–99.9)
MONOCYTES ABSOLUTE: 0.41 E9/L (ref 0.1–0.95)
MONOCYTES ABSOLUTE: 0.71 E9/L (ref 0.1–0.95)
MONOCYTES RELATIVE PERCENT: 10.6 % (ref 2–12)
MONOCYTES RELATIVE PERCENT: 5.2 % (ref 2–12)
NEGATIVE QC PASS/FAIL: NORMAL
NEUTROPHILS ABSOLUTE: 3.52 E9/L (ref 1.8–7.3)
NEUTROPHILS ABSOLUTE: 4.26 E9/L (ref 1.8–7.3)
NEUTROPHILS RELATIVE PERCENT: 52.2 % (ref 43–80)
NEUTROPHILS RELATIVE PERCENT: 52.8 % (ref 43–80)
PDW BLD-RTO: 12.6 FL (ref 11.5–15)
PDW BLD-RTO: 12.8 FL (ref 11.5–15)
PLATELET # BLD: 261 E9/L (ref 130–450)
PLATELET # BLD: 282 E9/L (ref 130–450)
PMV BLD AUTO: 10 FL (ref 7–12)
PMV BLD AUTO: 9.8 FL (ref 7–12)
POSITIVE QC PASS/FAIL: NORMAL
POTASSIUM REFLEX MAGNESIUM: 3.7 MMOL/L (ref 3.5–5)
POTASSIUM REFLEX MAGNESIUM: 3.7 MMOL/L (ref 3.5–5)
RBC # BLD: 3.69 E12/L (ref 3.5–5.5)
RBC # BLD: 3.8 E12/L (ref 3.5–5.5)
RBC # BLD: NORMAL 10*6/UL
SALICYLATE, SERUM: <0.3 MG/DL (ref 0–30)
SARS-COV-2, NAAT: NOT DETECTED
SEDIMENTATION RATE, ERYTHROCYTE: 110 MM/HR (ref 0–20)
SODIUM BLD-SCNC: 138 MMOL/L (ref 132–146)
SODIUM BLD-SCNC: 140 MMOL/L (ref 132–146)
TOTAL PROTEIN: 7.7 G/DL (ref 6.4–8.3)
TRICYCLIC ANTIDEPRESSANTS SCREEN SERUM: NEGATIVE NG/ML
TROPONIN, HIGH SENSITIVITY: <6 NG/L (ref 0–9)
TROPONIN, HIGH SENSITIVITY: <6 NG/L (ref 0–9)
WBC # BLD: 6.7 E9/L (ref 4.5–11.5)
WBC # BLD: 8.2 E9/L (ref 4.5–11.5)

## 2022-06-10 PROCEDURE — 96372 THER/PROPH/DIAG INJ SC/IM: CPT

## 2022-06-10 PROCEDURE — 70553 MRI BRAIN STEM W/O & W/DYE: CPT

## 2022-06-10 PROCEDURE — G0378 HOSPITAL OBSERVATION PER HR: HCPCS

## 2022-06-10 PROCEDURE — 6360000002 HC RX W HCPCS

## 2022-06-10 PROCEDURE — 96375 TX/PRO/DX INJ NEW DRUG ADDON: CPT

## 2022-06-10 PROCEDURE — 70544 MR ANGIOGRAPHY HEAD W/O DYE: CPT

## 2022-06-10 PROCEDURE — 82077 ASSAY SPEC XCP UR&BREATH IA: CPT

## 2022-06-10 PROCEDURE — 6370000000 HC RX 637 (ALT 250 FOR IP): Performed by: STUDENT IN AN ORGANIZED HEALTH CARE EDUCATION/TRAINING PROGRAM

## 2022-06-10 PROCEDURE — 85025 COMPLETE CBC W/AUTO DIFF WBC: CPT

## 2022-06-10 PROCEDURE — 80307 DRUG TEST PRSMV CHEM ANLYZR: CPT

## 2022-06-10 PROCEDURE — A9577 INJ MULTIHANCE: HCPCS | Performed by: RADIOLOGY

## 2022-06-10 PROCEDURE — 2580000003 HC RX 258: Performed by: STUDENT IN AN ORGANIZED HEALTH CARE EDUCATION/TRAINING PROGRAM

## 2022-06-10 PROCEDURE — 6360000002 HC RX W HCPCS: Performed by: STUDENT IN AN ORGANIZED HEALTH CARE EDUCATION/TRAINING PROGRAM

## 2022-06-10 PROCEDURE — 99219 PR INITIAL OBSERVATION CARE/DAY 50 MINUTES: CPT | Performed by: INTERNAL MEDICINE

## 2022-06-10 PROCEDURE — 70450 CT HEAD/BRAIN W/O DYE: CPT

## 2022-06-10 PROCEDURE — 71275 CT ANGIOGRAPHY CHEST: CPT

## 2022-06-10 PROCEDURE — 80179 DRUG ASSAY SALICYLATE: CPT

## 2022-06-10 PROCEDURE — 84484 ASSAY OF TROPONIN QUANT: CPT

## 2022-06-10 PROCEDURE — 85651 RBC SED RATE NONAUTOMATED: CPT

## 2022-06-10 PROCEDURE — 6360000004 HC RX CONTRAST MEDICATION: Performed by: RADIOLOGY

## 2022-06-10 PROCEDURE — 80143 DRUG ASSAY ACETAMINOPHEN: CPT

## 2022-06-10 PROCEDURE — 96374 THER/PROPH/DIAG INJ IV PUSH: CPT

## 2022-06-10 PROCEDURE — 86140 C-REACTIVE PROTEIN: CPT

## 2022-06-10 PROCEDURE — 80048 BASIC METABOLIC PNL TOTAL CA: CPT

## 2022-06-10 RX ORDER — SODIUM CHLORIDE 0.9 % (FLUSH) 0.9 %
5-40 SYRINGE (ML) INJECTION PRN
Status: DISCONTINUED | OUTPATIENT
Start: 2022-06-10 | End: 2022-06-12 | Stop reason: HOSPADM

## 2022-06-10 RX ORDER — SODIUM CHLORIDE 0.9 % (FLUSH) 0.9 %
5-40 SYRINGE (ML) INJECTION EVERY 12 HOURS SCHEDULED
Status: DISCONTINUED | OUTPATIENT
Start: 2022-06-10 | End: 2022-06-12 | Stop reason: HOSPADM

## 2022-06-10 RX ORDER — CLONIDINE HYDROCHLORIDE 0.2 MG/1
0.2 TABLET ORAL 2 TIMES DAILY
Status: ON HOLD | COMMUNITY
End: 2022-06-12 | Stop reason: SDUPTHER

## 2022-06-10 RX ORDER — BISACODYL 10 MG
10 SUPPOSITORY, RECTAL RECTAL DAILY PRN
Status: DISCONTINUED | OUTPATIENT
Start: 2022-06-10 | End: 2022-06-12 | Stop reason: HOSPADM

## 2022-06-10 RX ORDER — LORAZEPAM 2 MG/ML
1 INJECTION INTRAMUSCULAR ONCE
Status: COMPLETED | OUTPATIENT
Start: 2022-06-10 | End: 2022-06-10

## 2022-06-10 RX ORDER — ACETAMINOPHEN 325 MG/1
650 TABLET ORAL EVERY 6 HOURS PRN
Status: DISCONTINUED | OUTPATIENT
Start: 2022-06-10 | End: 2022-06-12 | Stop reason: HOSPADM

## 2022-06-10 RX ORDER — ONDANSETRON 4 MG/1
4 TABLET, ORALLY DISINTEGRATING ORAL EVERY 8 HOURS PRN
Status: DISCONTINUED | OUTPATIENT
Start: 2022-06-10 | End: 2022-06-12 | Stop reason: HOSPADM

## 2022-06-10 RX ORDER — QUETIAPINE FUMARATE 100 MG/1
100 TABLET, FILM COATED ORAL 2 TIMES DAILY
Status: DISCONTINUED | OUTPATIENT
Start: 2022-06-10 | End: 2022-06-12 | Stop reason: HOSPADM

## 2022-06-10 RX ORDER — MECLIZINE HCL 12.5 MG/1
25 TABLET ORAL ONCE
Status: COMPLETED | OUTPATIENT
Start: 2022-06-10 | End: 2022-06-10

## 2022-06-10 RX ORDER — BUTALBITAL, ACETAMINOPHEN AND CAFFEINE 50; 325; 40 MG/1; MG/1; MG/1
1 TABLET ORAL ONCE
Status: COMPLETED | OUTPATIENT
Start: 2022-06-10 | End: 2022-06-10

## 2022-06-10 RX ORDER — KETOROLAC TROMETHAMINE 30 MG/ML
15 INJECTION, SOLUTION INTRAMUSCULAR; INTRAVENOUS ONCE
Status: COMPLETED | OUTPATIENT
Start: 2022-06-10 | End: 2022-06-10

## 2022-06-10 RX ORDER — METHADONE HYDROCHLORIDE 10 MG/ML
100 CONCENTRATE ORAL DAILY
Status: DISCONTINUED | OUTPATIENT
Start: 2022-06-10 | End: 2022-06-12 | Stop reason: HOSPADM

## 2022-06-10 RX ORDER — ONDANSETRON 2 MG/ML
4 INJECTION INTRAMUSCULAR; INTRAVENOUS EVERY 6 HOURS PRN
Status: DISCONTINUED | OUTPATIENT
Start: 2022-06-10 | End: 2022-06-12 | Stop reason: HOSPADM

## 2022-06-10 RX ORDER — LIDOCAINE 4 G/G
1 PATCH TOPICAL DAILY
Status: DISCONTINUED | OUTPATIENT
Start: 2022-06-10 | End: 2022-06-12 | Stop reason: HOSPADM

## 2022-06-10 RX ORDER — ACETAMINOPHEN 650 MG/1
650 SUPPOSITORY RECTAL EVERY 6 HOURS PRN
Status: DISCONTINUED | OUTPATIENT
Start: 2022-06-10 | End: 2022-06-12 | Stop reason: HOSPADM

## 2022-06-10 RX ORDER — DEXAMETHASONE SODIUM PHOSPHATE 10 MG/ML
6 INJECTION, SOLUTION INTRAMUSCULAR; INTRAVENOUS ONCE
Status: COMPLETED | OUTPATIENT
Start: 2022-06-10 | End: 2022-06-10

## 2022-06-10 RX ORDER — ENOXAPARIN SODIUM 100 MG/ML
40 INJECTION SUBCUTANEOUS DAILY
Status: DISCONTINUED | OUTPATIENT
Start: 2022-06-10 | End: 2022-06-11

## 2022-06-10 RX ORDER — SODIUM CHLORIDE 9 MG/ML
INJECTION, SOLUTION INTRAVENOUS PRN
Status: DISCONTINUED | OUTPATIENT
Start: 2022-06-10 | End: 2022-06-12 | Stop reason: HOSPADM

## 2022-06-10 RX ORDER — POLYETHYLENE GLYCOL 3350 17 G/17G
17 POWDER, FOR SOLUTION ORAL DAILY PRN
Status: DISCONTINUED | OUTPATIENT
Start: 2022-06-10 | End: 2022-06-12 | Stop reason: HOSPADM

## 2022-06-10 RX ADMIN — DIPHENHYDRAMINE HYDROCHLORIDE 25 MG: 50 INJECTION, SOLUTION INTRAMUSCULAR; INTRAVENOUS at 00:12

## 2022-06-10 RX ADMIN — PROCHLORPERAZINE EDISYLATE 10 MG: 5 INJECTION INTRAMUSCULAR; INTRAVENOUS at 00:12

## 2022-06-10 RX ADMIN — DEXAMETHASONE SODIUM PHOSPHATE 6 MG: 10 INJECTION, SOLUTION INTRAMUSCULAR; INTRAVENOUS at 05:45

## 2022-06-10 RX ADMIN — ENOXAPARIN SODIUM 40 MG: 100 INJECTION SUBCUTANEOUS at 11:38

## 2022-06-10 RX ADMIN — QUETIAPINE FUMARATE 100 MG: 100 TABLET ORAL at 11:38

## 2022-06-10 RX ADMIN — QUETIAPINE FUMARATE 100 MG: 100 TABLET ORAL at 21:14

## 2022-06-10 RX ADMIN — SODIUM CHLORIDE 1000 ML: 9 INJECTION, SOLUTION INTRAVENOUS at 00:11

## 2022-06-10 RX ADMIN — LORAZEPAM 1 MG: 2 INJECTION INTRAMUSCULAR; INTRAVENOUS at 17:54

## 2022-06-10 RX ADMIN — ACETAMINOPHEN 325MG 650 MG: 325 TABLET ORAL at 16:35

## 2022-06-10 RX ADMIN — BUTALBITAL, ACETAMINOPHEN, AND CAFFEINE 1 TABLET: 50; 325; 40 TABLET ORAL at 21:14

## 2022-06-10 RX ADMIN — GADOBENATE DIMEGLUMINE 20 ML: 529 INJECTION, SOLUTION INTRAVENOUS at 18:52

## 2022-06-10 RX ADMIN — Medication 100 MG: at 12:43

## 2022-06-10 RX ADMIN — IOPAMIDOL 75 ML: 755 INJECTION, SOLUTION INTRAVENOUS at 02:07

## 2022-06-10 RX ADMIN — MECLIZINE HCL 25 MG: 12.5 TABLET ORAL at 03:26

## 2022-06-10 RX ADMIN — KETOROLAC TROMETHAMINE 15 MG: 30 INJECTION, SOLUTION INTRAMUSCULAR at 04:28

## 2022-06-10 ASSESSMENT — ENCOUNTER SYMPTOMS
PHOTOPHOBIA: 0
SHORTNESS OF BREATH: 0
VOMITING: 0
CHEST TIGHTNESS: 0
NAUSEA: 0
COUGH: 0
ABDOMINAL PAIN: 0
ABDOMINAL DISTENTION: 0
DIARRHEA: 0

## 2022-06-10 ASSESSMENT — PAIN SCALES - GENERAL
PAINLEVEL_OUTOF10: 8
PAINLEVEL_OUTOF10: 9
PAINLEVEL_OUTOF10: 10
PAINLEVEL_OUTOF10: 7
PAINLEVEL_OUTOF10: 10

## 2022-06-10 ASSESSMENT — PAIN DESCRIPTION - LOCATION
LOCATION: CHEST;HEAD
LOCATION: HEAD

## 2022-06-10 ASSESSMENT — LIFESTYLE VARIABLES
HOW OFTEN DO YOU HAVE A DRINK CONTAINING ALCOHOL: NEVER
HOW MANY STANDARD DRINKS CONTAINING ALCOHOL DO YOU HAVE ON A TYPICAL DAY: PATIENT DECLINED

## 2022-06-10 ASSESSMENT — PAIN DESCRIPTION - PAIN TYPE: TYPE: ACUTE PAIN

## 2022-06-10 ASSESSMENT — PAIN DESCRIPTION - FREQUENCY
FREQUENCY: CONTINUOUS
FREQUENCY: INTERMITTENT

## 2022-06-10 ASSESSMENT — PAIN DESCRIPTION - DESCRIPTORS
DESCRIPTORS: SHOOTING
DESCRIPTORS: SHOOTING

## 2022-06-10 ASSESSMENT — PAIN - FUNCTIONAL ASSESSMENT: PAIN_FUNCTIONAL_ASSESSMENT: ACTIVITIES ARE NOT PREVENTED

## 2022-06-10 ASSESSMENT — PAIN DESCRIPTION - ORIENTATION: ORIENTATION: ANTERIOR;LOWER

## 2022-06-10 ASSESSMENT — PAIN DESCRIPTION - ONSET: ONSET: ON-GOING

## 2022-06-10 NOTE — H&P
[facility]  Internal Medicine Residency Program  History and Physical    Patient:  Aftab Smoker 36 y.o. female MRN: [id number]     Date of Service: 6/10/2022    Hospital Day: 2      Chief complaint: had concerns including Chest Pain (tightness, since 0600 this am, sob). History of Present Illness   The patient is a 36 y.o. female with a past medical history of this (cocaine, amphetamine, tobacco), obesity, preeclampsia, multiple drug overdoses, mood disorder, recent admission between 5/2/2022 - 5/10/2022 for concerns of sinus bradycardia and chest pain which was resolved with discontinuing medications that induce bradycardia who presents to the ED today for concerns of chest pain as well as headache. Patient states that both the complaints of chest pain and headache have been present since patient's previous discharge on 5/10. In regard to the chest pain patient states that the pain is located in the left chest region, substernal, is continuously present, is stated to be a sharp pain, is not worsened by anything but relieved with lying down, does not radiate, and is rated as 10/10 patient states. In regards to patient's concerns for headache this is also been present since previous discharge on 5/10. Patient states that the pain is located in the mid occipital region and is continuous pain as well. Patient states that this is a sharp pain with radiation to the frontal region bilaterally. Patient states that the pain is always present but waxes and wanes in intensity periodically with no inciting causes. Patient states that bright lights worsen the pain and lying down improves and it. Patient also notes that she has feelings of lightheaded and dizziness associated during this time when she stands from a sitting position. Patient states that the symptoms have been progressively worsening which is what brought her to the ED.     In the ED, vitals seen to be overall stable with HR: 58, RR: 16, BP: 154/97. CMP overall nonsignificant. Troponins:<6 x2, CBC also overall WNL. D-dimer was seen to be 340. CTA chest was done which was negative for PE.  CXR was also done which showed no acute findings. For patient's headache she was given Compazine 10 mg IV, Benadryl 25 mg IV, Toradol 15 mg IV, Decadron 6 mg IV with minimal to no relief per patient. Patient continues to complain of headache despite treatment and was admitted to the floors for further management. Past Medical History:      Diagnosis Date    Abnormal Pap smear of cervix     Anemia     Drug abuse ([facility] [address]     Obesity     Overdose     multiple    Preeclampsia complicating hypertension 4/23/2021       Past Surgical History:    History reviewed. No pertinent surgical history. Medications Prior to Admission:    Prior to Admission medications    Medication Sig Start Date End Date Taking? Authorizing Provider   QUEtiapine (SEROQUEL) 100 MG tablet Take 1 tablet by mouth 2 times daily 5/10/22   Essie Almaraz MD   acetaminophen (TYLENOL) 500 MG tablet Take 1 tablet by mouth 4 times daily as needed for Pain 5/3/22   Naomy Gottlieb DO   methadone (DOLOPHINE) 10 MG/ML solution Take 160 mg by mouth every morning. Historical Provider, MD   methadone (DOLOPHINE) 10 MG/ML solution Take 45 mg by mouth every evening. Patient not taking: Reported on 5/28/2022    Historical Provider, MD   ketorolac (TORADOL) 10 MG tablet Take 1 tablet by mouth every 6 hours as needed for Pain 12/17/21 1/8/22  Fabiana Varghese PA-C       Allergies:  Patient has no known allergies. Social History:   TOBACCO:   reports that she has been smoking cigarettes. She has a 6.50 pack-year smoking history. She has never used smokeless tobacco.  ETOH:   reports no history of alcohol use. Family History:   History reviewed. No pertinent family history.     REVIEW OF SYSTEMS:    Constitutional: No fever, no chills, no change in weight, no nausea or vomiting  HEENT: No blurred vision, no ear problems, no sore throat, no rhinorrhea. Admits to occipial pain. Respiratory: admits to occasional cough,   Cardiology: Admits to chest pain, no dyspnea on exertion, no paroxysmal nocturnal dyspnea, admits to orthopnea, no palpitation, no leg swelling. Tenderness to touch of L. chest  Gastroenterology: No dysphagia, no reflux; no abdominal pain, no nausea or vomiting; no constipation or diarrhea. No hematochezia   Genitourinary: No dysuria, no frequency, hesitancy; no hematuria  Musculoskeletal: no joint pain, no myalgia, no change in range of movement  Neurology: no focal weakness in extremities, no slurred speech, no double vision, no tingling or numbness sensation  Endocrinology: no temperature intolerance, no polyphagia, polydipsia or polyuria  Hematology: no increased bleeding, no bruising, no lymphadenopathy  Skin: no skin changes noticed by patient  Psychology: no depressed mood, no suicidal ideation    Physical Exam   Vitals: BP (!) 128/94   Pulse (!) 46   Temp 98.5 °F (36.9 °C) (Oral)   Resp 28   Ht 5' 4\" (1.626 m)   Wt 190 lb (86.2 kg)   LMP 05/16/2022   SpO2 96%   BMI 32.61 kg/m²     General Appearance: AAOx3, lethargic when seen  Skin: warm and dry, no rash or erythema  Head: normocephalic and atraumatic  Eyes: pupils equal, round, and reactive to light, extraocular eye movements intact, conjunctivae normal  ENT: tympanic membrane, external ear and ear canal normal bilaterally, nose without deformity, nasal mucosa and turbinates normal without polyps  Neck: supple and non-tender without mass, no thyromegaly or thyroid nodules, no cervical lymphadenopathy  Pulmonary/Chest: clear to auscultation bilaterally- no wheezes, rales or rhonchi, normal air movement, no respiratory distress  Cardiovascular: normal rate, regular rhythm, normal S1 and S2, no murmurs, rubs, clicks, or gallops, distal pulses intact, no carotid bruits.  Tenderness to touch, but upon auscultation L. Chest non-tender --   Abdomen: soft, non-tender, non-distended, normal bowel sounds, no masses or organomegaly  Extremities: no cyanosis, clubbing or edema  Musculoskeletal: normal range of motion, no joint swelling, deformity or tenderness  Neurologic: reflexes normal and symmetric, no cranial nerve deficit, gait, coordination and speech normal; ambulated to restroom unassisted. Labs and Imaging Studies   Basic Labs  Recent Labs     06/09/22  2301      K 3.7      CO2 24   BUN 7   CREATININE 0.8   GLUCOSE 109*   CALCIUM 9.2       Recent Labs     06/09/22  2301   WBC 8.2   RBC 3.69   HGB 11.7   HCT 34.5   MCV 93.5   MCH 31.7   MCHC 33.9   RDW 12.6      MPV 9.8         Imaging Studies:  CT HEAD WO CONTRAST   Final Result   No acute intracranial abnormality. CTA CHEST W CONTRAST   Final Result   No acute finding in the chest with no evidence of pulmonary embolism. Questionable cholelithiasis. Ultrasound may be obtained to confirm. A tiny nonobstructing stone in the left kidney. XR CHEST PORTABLE   Final Result   Cardiomegaly. No additional acute findings. Resident's Assessment and Plan     Assessment:    Intractible Headache 2/2 status migranosus vs structural abnormality vs malignering? Chest pain? R/o MSK vs Malingering.  Cardiac wkup (-)  Hx of Polysubstance abuse (cocaine, Amphetamine, tobacco)    Plan  Please follow Neuro recs  F/u MRI brain w and w/o  F/u ESR, CRP  F/u orthostatics  F/u UDS, SDS  F/u third Troponin reading  Will call [facility] for Methadone dosing      PT/OT evaluation:   DVT prophylaxis/ GI prophylaxis: Lovenox/Diet   Disposition: admit to floors    Rhett Wade MD, PGY-1  Precepted with: Dr. Chato Neff  Attending physician: Dr. Victorino Alston

## 2022-06-10 NOTE — ED PROVIDER NOTES
Sarah Dominguez is a 36year old female present emergency department concern for multiple complaints. Patient states that she is having substernal chest pain that has been present for about 1 week and has been coming going patient also has a nonproductive cough. Patient states that she also has headache. Patient's headache is in the front of her head and worse with head movement. Patient denies any syncope or lightheadedness or dizziness. Patient denies fever, chills. Patient is also having chest tightness that is coming and going. Pain is substernal area. Patient was recently admitted for bradycardia patient was symptomatic at that time patient had stopped her AV yennifer blocking agents and had resolved symptoms. Patient is on methadone. Patient denies fever, chills, shortness of breath. Nothing make symptoms better or worse symptoms are coming and going and present for several days and worsening. The history is provided by the patient and medical records. Review of Systems   Constitutional: Positive for fatigue. Negative for chills, diaphoresis and fever. Eyes: Negative for photophobia and visual disturbance. Respiratory: Negative for cough, chest tightness and shortness of breath. Cardiovascular: Negative for chest pain, palpitations and leg swelling. Gastrointestinal: Negative for abdominal distention, abdominal pain, diarrhea, nausea and vomiting. Genitourinary: Negative for dysuria. Musculoskeletal: Negative for neck pain and neck stiffness. Skin: Negative for pallor and rash. Neurological: Positive for headaches. Negative for dizziness, syncope, weakness, light-headedness and numbness. Psychiatric/Behavioral: Negative for confusion. Physical Exam  Vitals and nursing note reviewed. Constitutional:       General: She is not in acute distress. Appearance: Normal appearance. She is not ill-appearing. HENT:      Head: Normocephalic and atraumatic.    Eyes: General: No scleral icterus. Conjunctiva/sclera: Conjunctivae normal.      Pupils: Pupils are equal, round, and reactive to light. Cardiovascular:      Rate and Rhythm: Normal rate and regular rhythm. Pulmonary:      Effort: Pulmonary effort is normal.      Breath sounds: Normal breath sounds. Abdominal:      General: Bowel sounds are normal. There is no distension. Palpations: Abdomen is soft. Tenderness: There is no abdominal tenderness. There is no guarding or rebound. Musculoskeletal:      Cervical back: Normal range of motion and neck supple. No rigidity. No muscular tenderness. Right lower leg: No edema. Left lower leg: No edema. Skin:     General: Skin is warm and dry. Capillary Refill: Capillary refill takes less than 2 seconds. Coloration: Skin is not pale. Findings: No erythema or rash. Neurological:      Mental Status: She is alert and oriented to person, place, and time. Psychiatric:         Mood and Affect: Mood normal.          Procedures     MDM  Number of Diagnoses or Management Options  Biliary calculus of other site without obstruction  Chest pain, unspecified type  Diagnosis management comments: Titus Parsons is a 74-year-old female presented to emergency department with concern for multiple complaints. Patient was having chest pain patient had unremarkable evaluation for chest pain had a heart score of 1 patient is low risk for ACS. Patient was advised to follow-up as an outpatient for chest pain. CTA was unremarkable for PE. Patient did have a headache. Patient had normal neurological exam patient was afebrile. Patient not have meningeal signs on exam.  Patient has had headache and dizziness has been coming and going for several days and worsening. Patient has dropped her 3year-old daughter often because she cannot care for due to headache.   Patient was given Decadron, Compazine, IV fluids, Toradol, Antivert, without improvement of symptoms. CT head was unremarkable patient has a normal neurological exam with NIH of 0 patient will be admitted for intractable headache.          --------------------------------------------- PAST HISTORY ---------------------------------------------  Past Medical History:  has a past medical history of Abnormal Pap smear of cervix, Anemia, Drug abuse ([address] Obesity, Overdose, and Preeclampsia complicating hypertension. Past Surgical History:  has no past surgical history on file. Social History:  reports that she has been smoking cigarettes. She has a 6.50 pack-year smoking history. She has never used smokeless tobacco. She reports previous drug use. Drug: Methamphetamines (Crystal Meth). She reports that she does not drink alcohol. Family History: family history is not on file. The patients home medications have been reviewed. Allergies: Patient has no known allergies.     -------------------------------------------------- RESULTS -------------------------------------------------    LABS:  Results for orders placed or performed during the hospital encounter of 06/09/22   COVID-19, Rapid    Specimen: Nasopharyngeal Swab   Result Value Ref Range    SARS-CoV-2, NAAT Not Detected Not Detected   CBC with Auto Differential   Result Value Ref Range    WBC 8.2 4.5 - 11.5 E9/L    RBC 3.69 3.50 - 5.50 E12/L    Hemoglobin 11.7 11.5 - 15.5 g/dL    Hematocrit 34.5 34.0 - 48.0 %    MCV 93.5 80.0 - 99.9 fL    MCH 31.7 26.0 - 35.0 pg    MCHC 33.9 32.0 - 34.5 %    RDW 12.6 11.5 - 15.0 fL    Platelets 040 765 - 740 E9/L    MPV 9.8 7.0 - 12.0 fL    Neutrophils % 52.2 43.0 - 80.0 %    Lymphocytes % 40.9 20.0 - 42.0 %    Monocytes % 5.2 2.0 - 12.0 %    Eosinophils % 1.7 0.0 - 6.0 %    Basophils % 0.4 0.0 - 2.0 %    Neutrophils Absolute 4.26 1.80 - 7.30 E9/L    Lymphocytes Absolute 3.36 1.50 - 4.00 E9/L    Monocytes Absolute 0.41 0.10 - 0.95 E9/L    Eosinophils Absolute 0.14 0.05 - 0.50 E9/L    Basophils Absolute 0. 00 0.00 - 0.20 E9/L    RBC Morphology Normal    Comprehensive Metabolic Panel w/ Reflex to MG   Result Value Ref Range    Sodium 138 132 - 146 mmol/L    Potassium reflex Magnesium 3.7 3.5 - 5.0 mmol/L    Chloride 102 98 - 107 mmol/L    CO2 24 22 - 29 mmol/L    Anion Gap 12 7 - 16 mmol/L    Glucose 109 (H) 74 - 99 mg/dL    BUN 7 6 - 20 mg/dL    CREATININE 0.8 0.5 - 1.0 mg/dL    GFR Non-African American >60 >=60 mL/min/1.73    GFR African American >60     Calcium 9.2 8.6 - 10.2 mg/dL    Total Protein 7.7 6.4 - 8.3 g/dL    Albumin 4.0 3.5 - 5.2 g/dL    Total Bilirubin <0.2 0.0 - 1.2 mg/dL    Alkaline Phosphatase 117 (H) 35 - 104 U/L    ALT 34 (H) 0 - 32 U/L    AST 35 (H) 0 - 31 U/L   Troponin   Result Value Ref Range    Troponin, High Sensitivity <6 0 - 9 ng/L   D-Dimer, Quantitative   Result Value Ref Range    D-Dimer, Quant 340 ng/mL DDU   Basic Metabolic Panel w/ Reflex to MG   Result Value Ref Range    Sodium 140 132 - 146 mmol/L    Potassium reflex Magnesium 3.7 3.5 - 5.0 mmol/L    Chloride 106 98 - 107 mmol/L    CO2 24 22 - 29 mmol/L    Anion Gap 10 7 - 16 mmol/L    Glucose 98 74 - 99 mg/dL    BUN 6 6 - 20 mg/dL    CREATININE 0.8 0.5 - 1.0 mg/dL    GFR Non-African American >60 >=60 mL/min/1.73    GFR African American >60     Calcium 8.6 8.6 - 10.2 mg/dL   CBC with Auto Differential   Result Value Ref Range    WBC 6.7 4.5 - 11.5 E9/L    RBC 3.80 3.50 - 5.50 E12/L    Hemoglobin 11.9 11.5 - 15.5 g/dL    Hematocrit 35.5 34.0 - 48.0 %    MCV 93.4 80.0 - 99.9 fL    MCH 31.3 26.0 - 35.0 pg    MCHC 33.5 32.0 - 34.5 %    RDW 12.8 11.5 - 15.0 fL    Platelets 202 688 - 478 E9/L    MPV 10.0 7.0 - 12.0 fL    Neutrophils % 52.8 43.0 - 80.0 %    Immature Granulocytes % 0.1 0.0 - 5.0 %    Lymphocytes % 32.7 20.0 - 42.0 %    Monocytes % 10.6 2.0 - 12.0 %    Eosinophils % 3.4 0.0 - 6.0 %    Basophils % 0.4 0.0 - 2.0 %    Neutrophils Absolute 3.52 1.80 - 7.30 E9/L    Immature Granulocytes # 0.01 E9/L    Lymphocytes Absolute 2.19 1.50 - 4.00 E9/L    Monocytes Absolute 0.71 0.10 - 0.95 E9/L    Eosinophils Absolute 0.23 0.05 - 0.50 E9/L    Basophils Absolute 0.03 0.00 - 0.20 E9/L   SERUM DRUG SCREEN   Result Value Ref Range    Ethanol Lvl <10 mg/dL    Acetaminophen Level <5.0 (L) 10.0 - 54.6 mcg/mL    Salicylate, Serum <9.9 0.0 - 30.0 mg/dL    TCA Scrn NEGATIVE Cutoff:300 ng/mL   C-Reactive Protein   Result Value Ref Range    CRP 0.7 (H) 0.0 - 0.4 mg/dL   Troponin   Result Value Ref Range    Troponin, High Sensitivity <6 0 - 9 ng/L   POC Pregnancy Urine   Result Value Ref Range    HCG, Urine, POC Negative Negative    Lot Number [id number]     Positive QC Pass/Fail Pass     Negative QC Pass/Fail Pass    EKG 12 Lead   Result Value Ref Range    Ventricular Rate 53 BPM    Atrial Rate 53 BPM    P-R Interval 184 ms    QRS Duration 88 ms    Q-T Interval 490 ms    QTc Calculation (Bazett) 459 ms    P Axis 28 degrees    R Axis 56 degrees    T Axis 51 degrees       RADIOLOGY:  CT HEAD WO CONTRAST   Final Result   No acute intracranial abnormality. CTA CHEST W CONTRAST   Final Result   No acute finding in the chest with no evidence of pulmonary embolism. Questionable cholelithiasis. Ultrasound may be obtained to confirm. A tiny nonobstructing stone in the left kidney. XR CHEST PORTABLE   Final Result   Cardiomegaly. No additional acute findings. MRI BRAIN W WO CONTRAST    (Results Pending)   MRV HEAD WO CONTRAST    (Results Pending)       EKG: This EKG is signed and interpreted by me. Rate: 53  Rhythm: Sinus  Interpretation: non-specific EKG  Comparison: changes compared to previous EKG and no previous EKG available      ------------------------- NURSING NOTES AND VITALS REVIEWED ---------------------------  Date / Time Roomed:  6/9/2022 11:23 PM  ED Bed Assignment:  [id number]    The nursing notes within the ED encounter and vital signs as below have been reviewed.      Patient Vitals for the past 24 hrs:   BP Temp Temp src Pulse Resp SpO2 Height Weight   06/10/22 0744 (!) 128/94   (!) 46 28 96 %     06/10/22 0645 138/82   (!) 47 12 98 %     06/10/22 0445 133/78   55 16 98 %     06/10/22 0145 (!) 142/85   62 16 98 %     06/09/22 2248 (!) 154/97 98.5 °F (36.9 °C) Oral 58 16 99 % 5' 4\" (1.626 m) 190 lb (86.2 kg)       Oxygen Saturation Interpretation: Normal    ------------------------------------------ PROGRESS NOTES ------------------------------------------  Re-evaluation(s):  Time: 5am Patients symptoms show no change  Repeat physical examination is not changed    Counseling:  I have spoken with the patient and discussed todays results, in addition to providing specific details for the plan of care and counseling regarding the diagnosis and prognosis. Their questions are answered at this time and they are agreeable with the plan of admission.    --------------------------------- ADDITIONAL PROVIDER NOTES ---------------------------------  Consultations:Spoke with Dr. Amor Paredes. Discussed case. They will admit the patient. This patient's ED course included: a personal history and physicial examination, re-evaluation prior to disposition, multiple bedside re-evaluations and IV medications    This patient has remained hemodynamically stable during their ED course. Diagnosis:  1. Intractable headache, unspecified chronicity pattern, unspecified headache type    2. Chest pain, unspecified type    3. Biliary calculus of other site without obstruction        Disposition:  Patient's disposition: Admit to med/surg floor  Patient's condition is stable.                Antonio Rea MD  06/10/22 1247

## 2022-06-10 NOTE — PROGRESS NOTES
Received co-sign to alter order for MRV head with and without contrast to be MRV head without contrast.    Called MRI and spoke to Parminder, who states the contrast would light up the MRV too much for proper visualization. Co-sign submitted, will agree to MRV without contrast.     Contacted radiology, was able to speak with Dr. Gary Moreno from [facility]. He states that MRV head without contrast should be sufficient to visualize an thrombus/disruption of cerebral sinus flow. He also recommends an MRI brain as well for further studies given her clinical vignette, which we have on board from Dr. Emmett Lombard.     Electronically signed by Benja Sheikh MD on 6/10/2022 at 2:18 PM

## 2022-06-11 LAB
ADENOVIRUS BY PCR: NOT DETECTED
ANION GAP SERPL CALCULATED.3IONS-SCNC: 14 MMOL/L (ref 7–16)
BASOPHILS ABSOLUTE: 0.02 E9/L (ref 0–0.2)
BASOPHILS RELATIVE PERCENT: 0.3 % (ref 0–2)
BORDETELLA PARAPERTUSSIS BY PCR: NOT DETECTED
BORDETELLA PERTUSSIS BY PCR: NOT DETECTED
BUN BLDV-MCNC: 9 MG/DL (ref 6–20)
CALCIUM SERPL-MCNC: 9.2 MG/DL (ref 8.6–10.2)
CHLAMYDOPHILIA PNEUMONIAE BY PCR: NOT DETECTED
CHLORIDE BLD-SCNC: 102 MMOL/L (ref 98–107)
CO2: 21 MMOL/L (ref 22–29)
CORONAVIRUS 229E BY PCR: NOT DETECTED
CORONAVIRUS HKU1 BY PCR: NOT DETECTED
CORONAVIRUS NL63 BY PCR: NOT DETECTED
CORONAVIRUS OC43 BY PCR: NOT DETECTED
CREAT SERPL-MCNC: 0.9 MG/DL (ref 0.5–1)
EOSINOPHILS ABSOLUTE: 0.2 E9/L (ref 0.05–0.5)
EOSINOPHILS RELATIVE PERCENT: 3.5 % (ref 0–6)
GFR AFRICAN AMERICAN: >60
GFR NON-AFRICAN AMERICAN: >60 ML/MIN/1.73
GLUCOSE BLD-MCNC: 120 MG/DL (ref 74–99)
HCT VFR BLD CALC: 35.7 % (ref 34–48)
HEMOGLOBIN: 12.2 G/DL (ref 11.5–15.5)
HUMAN METAPNEUMOVIRUS BY PCR: NOT DETECTED
HUMAN RHINOVIRUS/ENTEROVIRUS BY PCR: NOT DETECTED
IMMATURE GRANULOCYTES #: 0.01 E9/L
IMMATURE GRANULOCYTES %: 0.2 % (ref 0–5)
INFLUENZA A BY PCR: NOT DETECTED
INFLUENZA B BY PCR: NOT DETECTED
LYMPHOCYTES ABSOLUTE: 2.56 E9/L (ref 1.5–4)
LYMPHOCYTES RELATIVE PERCENT: 44.4 % (ref 20–42)
MAGNESIUM: 1.9 MG/DL (ref 1.6–2.6)
MCH RBC QN AUTO: 32 PG (ref 26–35)
MCHC RBC AUTO-ENTMCNC: 34.2 % (ref 32–34.5)
MCV RBC AUTO: 93.7 FL (ref 80–99.9)
METER GLUCOSE: 113 MG/DL (ref 74–99)
MONOCYTES ABSOLUTE: 0.53 E9/L (ref 0.1–0.95)
MONOCYTES RELATIVE PERCENT: 9.2 % (ref 2–12)
MYCOPLASMA PNEUMONIAE BY PCR: NOT DETECTED
NEUTROPHILS ABSOLUTE: 2.45 E9/L (ref 1.8–7.3)
NEUTROPHILS RELATIVE PERCENT: 42.4 % (ref 43–80)
PARAINFLUENZA VIRUS 1 BY PCR: NOT DETECTED
PARAINFLUENZA VIRUS 2 BY PCR: NOT DETECTED
PARAINFLUENZA VIRUS 3 BY PCR: NOT DETECTED
PARAINFLUENZA VIRUS 4 BY PCR: NOT DETECTED
PDW BLD-RTO: 12.9 FL (ref 11.5–15)
PLATELET # BLD: 273 E9/L (ref 130–450)
PMV BLD AUTO: 10.5 FL (ref 7–12)
POTASSIUM REFLEX MAGNESIUM: 3.5 MMOL/L (ref 3.5–5)
RBC # BLD: 3.81 E12/L (ref 3.5–5.5)
RESPIRATORY SYNCYTIAL VIRUS BY PCR: NOT DETECTED
SARS-COV-2, PCR: NOT DETECTED
SODIUM BLD-SCNC: 137 MMOL/L (ref 132–146)
WBC # BLD: 5.8 E9/L (ref 4.5–11.5)

## 2022-06-11 PROCEDURE — 99223 1ST HOSP IP/OBS HIGH 75: CPT | Performed by: PSYCHIATRY & NEUROLOGY

## 2022-06-11 PROCEDURE — 6370000000 HC RX 637 (ALT 250 FOR IP): Performed by: STUDENT IN AN ORGANIZED HEALTH CARE EDUCATION/TRAINING PROGRAM

## 2022-06-11 PROCEDURE — 80048 BASIC METABOLIC PNL TOTAL CA: CPT

## 2022-06-11 PROCEDURE — 86803 HEPATITIS C AB TEST: CPT

## 2022-06-11 PROCEDURE — 6360000002 HC RX W HCPCS: Performed by: PSYCHIATRY & NEUROLOGY

## 2022-06-11 PROCEDURE — 85025 COMPLETE CBC W/AUTO DIFF WBC: CPT

## 2022-06-11 PROCEDURE — 82962 GLUCOSE BLOOD TEST: CPT

## 2022-06-11 PROCEDURE — 6370000000 HC RX 637 (ALT 250 FOR IP): Performed by: INTERNAL MEDICINE

## 2022-06-11 PROCEDURE — 86703 HIV-1/HIV-2 1 RESULT ANTBDY: CPT

## 2022-06-11 PROCEDURE — 6360000002 HC RX W HCPCS: Performed by: STUDENT IN AN ORGANIZED HEALTH CARE EDUCATION/TRAINING PROGRAM

## 2022-06-11 PROCEDURE — 96372 THER/PROPH/DIAG INJ SC/IM: CPT

## 2022-06-11 PROCEDURE — 96376 TX/PRO/DX INJ SAME DRUG ADON: CPT

## 2022-06-11 PROCEDURE — 83735 ASSAY OF MAGNESIUM: CPT

## 2022-06-11 PROCEDURE — 0202U NFCT DS 22 TRGT SARS-COV-2: CPT

## 2022-06-11 PROCEDURE — G0378 HOSPITAL OBSERVATION PER HR: HCPCS

## 2022-06-11 PROCEDURE — 2580000003 HC RX 258: Performed by: STUDENT IN AN ORGANIZED HEALTH CARE EDUCATION/TRAINING PROGRAM

## 2022-06-11 PROCEDURE — 36415 COLL VENOUS BLD VENIPUNCTURE: CPT

## 2022-06-11 PROCEDURE — 6370000000 HC RX 637 (ALT 250 FOR IP): Performed by: PSYCHIATRY & NEUROLOGY

## 2022-06-11 PROCEDURE — 99226 PR SBSQ OBSERVATION CARE/DAY 35 MINUTES: CPT | Performed by: INTERNAL MEDICINE

## 2022-06-11 RX ORDER — DIAZEPAM 5 MG/1
5 TABLET ORAL ONCE
Status: COMPLETED | OUTPATIENT
Start: 2022-06-11 | End: 2022-06-11

## 2022-06-11 RX ORDER — DIPHENHYDRAMINE HYDROCHLORIDE 50 MG/ML
25 INJECTION INTRAMUSCULAR; INTRAVENOUS ONCE
Status: COMPLETED | OUTPATIENT
Start: 2022-06-11 | End: 2022-06-11

## 2022-06-11 RX ORDER — SUMATRIPTAN 6 MG/.5ML
6 INJECTION, SOLUTION SUBCUTANEOUS ONCE
Status: COMPLETED | OUTPATIENT
Start: 2022-06-11 | End: 2022-06-11

## 2022-06-11 RX ORDER — CYCLOBENZAPRINE HCL 10 MG
10 TABLET ORAL ONCE
Status: COMPLETED | OUTPATIENT
Start: 2022-06-11 | End: 2022-06-11

## 2022-06-11 RX ORDER — HYDROMORPHONE HYDROCHLORIDE 2 MG/1
2 TABLET ORAL ONCE
Status: COMPLETED | OUTPATIENT
Start: 2022-06-11 | End: 2022-06-11

## 2022-06-11 RX ORDER — MELOXICAM 7.5 MG/1
15 TABLET ORAL DAILY
Status: DISCONTINUED | OUTPATIENT
Start: 2022-06-12 | End: 2022-06-12 | Stop reason: HOSPADM

## 2022-06-11 RX ORDER — PROMETHAZINE HYDROCHLORIDE 25 MG/1
25 TABLET ORAL ONCE
Status: COMPLETED | OUTPATIENT
Start: 2022-06-11 | End: 2022-06-11

## 2022-06-11 RX ORDER — DEXAMETHASONE SODIUM PHOSPHATE 4 MG/ML
10 INJECTION, SOLUTION INTRA-ARTICULAR; INTRALESIONAL; INTRAMUSCULAR; INTRAVENOUS; SOFT TISSUE ONCE
Status: COMPLETED | OUTPATIENT
Start: 2022-06-11 | End: 2022-06-11

## 2022-06-11 RX ORDER — METHOCARBAMOL 500 MG/1
1000 TABLET, FILM COATED ORAL 3 TIMES DAILY
Status: DISCONTINUED | OUTPATIENT
Start: 2022-06-11 | End: 2022-06-12 | Stop reason: HOSPADM

## 2022-06-11 RX ADMIN — DIPHENHYDRAMINE HYDROCHLORIDE 25 MG: 50 INJECTION, SOLUTION INTRAMUSCULAR; INTRAVENOUS at 13:45

## 2022-06-11 RX ADMIN — BISACODYL 10 MG: 10 SUPPOSITORY RECTAL at 09:36

## 2022-06-11 RX ADMIN — DIAZEPAM 5 MG: 5 TABLET ORAL at 13:45

## 2022-06-11 RX ADMIN — CYCLOBENZAPRINE 10 MG: 10 TABLET, FILM COATED ORAL at 09:34

## 2022-06-11 RX ADMIN — QUETIAPINE FUMARATE 100 MG: 100 TABLET ORAL at 09:34

## 2022-06-11 RX ADMIN — PROMETHAZINE HYDROCHLORIDE 25 MG: 25 TABLET ORAL at 13:45

## 2022-06-11 RX ADMIN — METHOCARBAMOL 1000 MG: 500 TABLET ORAL at 20:28

## 2022-06-11 RX ADMIN — Medication 100 MG: at 09:32

## 2022-06-11 RX ADMIN — QUETIAPINE FUMARATE 100 MG: 100 TABLET ORAL at 20:28

## 2022-06-11 RX ADMIN — DEXAMETHASONE SODIUM PHOSPHATE 10 MG: 4 INJECTION, SOLUTION INTRA-ARTICULAR; INTRALESIONAL; INTRAMUSCULAR; INTRAVENOUS; SOFT TISSUE at 13:45

## 2022-06-11 RX ADMIN — ACETAMINOPHEN 325MG 650 MG: 325 TABLET ORAL at 20:28

## 2022-06-11 RX ADMIN — ENOXAPARIN SODIUM 40 MG: 100 INJECTION SUBCUTANEOUS at 09:34

## 2022-06-11 RX ADMIN — SODIUM CHLORIDE, PRESERVATIVE FREE 10 ML: 5 INJECTION INTRAVENOUS at 13:46

## 2022-06-11 RX ADMIN — HYDROMORPHONE HYDROCHLORIDE 2 MG: 2 TABLET ORAL at 13:46

## 2022-06-11 RX ADMIN — SODIUM CHLORIDE, PRESERVATIVE FREE 10 ML: 5 INJECTION INTRAVENOUS at 20:29

## 2022-06-11 RX ADMIN — METHOCARBAMOL 1000 MG: 500 TABLET ORAL at 13:46

## 2022-06-11 RX ADMIN — SUMATRIPTAN 6 MG: 6 INJECTION SUBCUTANEOUS at 13:45

## 2022-06-11 RX ADMIN — SODIUM CHLORIDE, PRESERVATIVE FREE 10 ML: 5 INJECTION INTRAVENOUS at 09:36

## 2022-06-11 ASSESSMENT — PAIN DESCRIPTION - PAIN TYPE: TYPE: ACUTE PAIN

## 2022-06-11 ASSESSMENT — PAIN DESCRIPTION - ONSET: ONSET: ON-GOING

## 2022-06-11 ASSESSMENT — PAIN DESCRIPTION - DESCRIPTORS: DESCRIPTORS: THROBBING

## 2022-06-11 ASSESSMENT — PAIN DESCRIPTION - LOCATION: LOCATION: HEAD

## 2022-06-11 ASSESSMENT — PAIN SCALES - GENERAL
PAINLEVEL_OUTOF10: 6
PAINLEVEL_OUTOF10: 10
PAINLEVEL_OUTOF10: 9
PAINLEVEL_OUTOF10: 10
PAINLEVEL_OUTOF10: 6

## 2022-06-11 ASSESSMENT — PAIN - FUNCTIONAL ASSESSMENT: PAIN_FUNCTIONAL_ASSESSMENT: PREVENTS OR INTERFERES SOME ACTIVE ACTIVITIES AND ADLS

## 2022-06-11 ASSESSMENT — PAIN DESCRIPTION - ORIENTATION: ORIENTATION: ANTERIOR

## 2022-06-11 ASSESSMENT — PAIN DESCRIPTION - FREQUENCY: FREQUENCY: CONTINUOUS

## 2022-06-11 NOTE — CONSULTS
NEUROLOGY CONSULT NOTE      Requesting Physician: Festus Echeverria MD    Reason for Consult:  Evaluate for concerns for intractable headache. History of Present Illness:  Iven Brunner is a 36 y.o. female  with h/o bradycardia, drug abuse(cocaine, amphetamine), multiple drug overdoses, and morbid obesity who was admitted to [facility]  on 6/9/2022 with presentation of chest pain as well as headache. She had a recent admission between 5/2/2022 - 5/10/2022 for concerns of sinus bradycardia and chest pain which was resolved with discontinuing medications that induce bradycardia. Patient states that both the complaints of chest pain and headache have been present since patient's previous discharge on 5/10. Her chest pain is a sharp, continuous, nonradiating, 10/10 severity pain  located in the left chest region. Relief with lying down, but no reported aggravating factors. Her headaches have also been present since her last admission with description of a sharp, continuous, mid-occipital pain radiating to bilateral frontal region that waxes and wanes. Associated eye pain noted with report of pain but feels it is within the eye and behind the eye. She denies any blurring of her vision or loss of vision. She does have neck tenderness and stiffness reported with no prior history of neck pain or neck trauma. Pain is worsened by bright lights and improved by lying down. She also reports feelings of lightheaded and dizziness associated with standing from a sitting position. He denies any focal weakness, numbness, tingling, vertigo, vision change, or gait/balance disturbance with her headaches. Overall progressive worsening of symptoms since onset. CT head on admission was negative for acute intracranial abnormalities with unremarkable MRI and MRV of the brain.        Past Medical History:        Diagnosis Date    Abnormal Pap smear of cervix     Anemia     Drug abuse (HCC)     Obesity     Overdose multiple    Preeclampsia complicating hypertension 4/23/2021       History reviewed. No pertinent surgical history. Social History:  Social History     Tobacco Use   Smoking Status Current Every Day Smoker    Packs/day: 0.50    Years: 13.00    Pack years: 6.50    Types: Cigarettes   Smokeless Tobacco Never Used     Social History     Substance and Sexual Activity   Alcohol Use No     Social History     Substance and Sexual Activity   Drug Use Not Currently    Types: Methamphetamines (Crystal Meth)    Comment: Elon Claude 1/26/21     Single    Family History:   History reviewed. No pertinent family history. Review of Systems:  All systems reviewed are negative except what is mentioned in history of present illness. Allergies:    No Known Allergies     Current Medications:   QUEtiapine (SEROQUEL) tablet 100 mg, BID  sodium chloride flush 0.9 % injection 5-40 mL, 2 times per day  sodium chloride flush 0.9 % injection 5-40 mL, PRN  0.9 % sodium chloride infusion, PRN  enoxaparin (LOVENOX) injection 40 mg, Daily  ondansetron (ZOFRAN-ODT) disintegrating tablet 4 mg, Q8H PRN   Or  ondansetron (ZOFRAN) injection 4 mg, Q6H PRN  polyethylene glycol (GLYCOLAX) packet 17 g, Daily PRN  acetaminophen (TYLENOL) tablet 650 mg, Q6H PRN   Or  acetaminophen (TYLENOL) suppository 650 mg, Q6H PRN  lidocaine 4 % external patch 1 patch, Daily  methadone (DOLOPHINE) 10 MG/ML solution 100 mg, Daily  bisacodyl (DULCOLAX) suppository 10 mg, Daily PRN         Physical Exam:  /72   Pulse 52   Temp 97.4 °F (36.3 °C) (Temporal)   Resp 18   Ht 5' 4\" (1.626 m)   Wt 190 lb (86.2 kg)   LMP 05/16/2022   SpO2 96%   BMI 32.61 kg/m²  I Body mass index is 32.61 kg/m². I   Wt Readings from Last 1 Encounters:   06/09/22 190 lb (86.2 kg)          HEENT: Normocephalic, atraumatic, no lesions or abnormalities noted. Neck:  supple with full ROM; no masses, nodes or bruits; no cervical tenderness on palpation.      Lungs:  clear to auscultation  bilaterally     CV: RRR without gallops or murmurs     Extremities: no c/c/e      Back: no tenderness with palpation / Tenderness per diagram ; no scoliosis; no kyphosis negative straight leg raising normal ROM in low back, pelvis, hips         Neurologic Exam     Mental Status:  Patient was alert, responsive, oriented, appropriate, answering questions, and following commands. Speech was fluent with normal sensorium and cognition. Cranial Nerves: Pupils were equal round and reactive to light with note of moderate to severe photosensitivity; Visual fields were full on confrontation; Extraocular movements were intact; no nystagmus; Intact facial sensation to temp and pinprick;    Symmetric facial movements with good lip and eye closure bilaterally; Hearing was intact; Cowart was midline;    Normal palatal elevation with midline uvula  Trapezius strength of 5/5. Tongue was midline with no atrophy or fasciculations  Motor Exam:  Strength was 5/5 throughout with note of some mild5 -/5 weakness in left arm associated with drift when compared to right; normal tone and bulk; no atrophy or fasiculations noted. Sensory Exam:  Normal sensation to vibration, light touch, pin-prick, and temperature; No sensory extinction. Cerebella Exam:  Intact finger-nose-finger, rapidly alternating movements, fine motor movements; No cerebellar rebound or drift; No tremors  or abnormal movements. Gait:  Gait was not tested due to worsening symptoms associated with standing and walking.    Reflexes: Absent throughout; Babinski is negative    Labs:    CBC:   Recent Labs     06/09/22  2301 06/10/22  1126 06/11/22  0449   WBC 8.2 6.7 5.8   HGB 11.7 11.9 12.2    261 273   MCV 93.5 93.4 93.7   MCH 31.7 31.3 32.0   MCHC 33.9 33.5 34.2   RDW 12.6 12.8 12.9     CMP:  Recent Labs     06/09/22  2301 06/10/22  1126 06/11/22  0449    140 137   K 3.7 3.7 3.5    106 102   CO2 24 24 21*   BUN 7 6 9   CREATININE 0.8 0.8 0.9   GFRAA >60 >60 >60   LABGLOM >60 >60 >60   GLUCOSE 109* 98 120*   CALCIUM 9.2 8.6 9.2     Liver:   Recent Labs     06/09/22  2301   AST 35*   ALT 34*   ALKPHOS 117*   PROT 7.7   LABALBU 4.0   BILITOT <0.2     INR: No results for input(s): PROTIME, INR in the last 72 hours. ToxicologyNo results for input(s): PHENYTOIN, CARBTOT, PHENOBARB, VALPROATE in the last 72 hours. Invalid input(s): LAMOTRIG,  KEPPRA  No results for input(s): AMPMETHURSCR, BARBTQTU, BDZQTU, CANNABQUANT, COCMETQTU, OPIAU, PCPQUANT in the last 72 hours. Radiology:  CT HEAD WO CONTRAST    Result Date: 6/10/2022  EXAMINATION: CT OF THE HEAD WITHOUT CONTRAST  6/10/2022 2:02 am TECHNIQUE: CT of the head was performed without the administration of intravenous contrast. Automated exposure control, iterative reconstruction, and/or weight based adjustment of the mA/kV was utilized to reduce the radiation dose to as low as reasonably achievable. COMPARISON: 08/05/2021 HISTORY: ORDERING SYSTEM PROVIDED HISTORY: TECHNOLOGIST PROVIDED HISTORY: Has a \"code stroke\" or \"stroke alert\" been called? ->No Reason for exam:->HA Decision Support Exception - unselect if not a suspected or confirmed emergency medical condition->Emergency Medical Condition (MA) What reading provider will be dictating this exam?->CRC FINDINGS: BRAIN/VENTRICLES: There is no acute intracranial hemorrhage, mass effect or midline shift. No abnormal extra-axial fluid collection. The gray-white differentiation is maintained without evidence of an acute infarct. There is no evidence of hydrocephalus. ORBITS: The visualized portion of the orbits demonstrate no acute abnormality. SINUSES: The visualized paranasal sinuses and mastoid air cells demonstrate no acute abnormality. SOFT TISSUES/SKULL:  No acute abnormality of the visualized skull or soft tissues. No acute intracranial abnormality.      XR CHEST PORTABLE    Result Date: 6/10/2022  EXAMINATION: ONE XRAY VIEW OF THE CHEST6/9/2022 TECHNIQUE: Frontal view submitted COMPARISON: 2021 HISTORY: ORDERING SYSTEM PROVIDED HISTORY: CP TECHNOLOGIST PROVIDED HISTORY: Reason for exam:->CP What reading provider will be dictating this exam?->CRC FINDINGS: The lungs are clear without focal consolidation, large pleural effusion, or pneumothorax. The cardiomediastinal silhouette is enlarged. Cardiomegaly. No additional acute findings. CTA CHEST W CONTRAST    Result Date: 6/10/2022  EXAMINATION: CTA OF THE CHEST 6/10/2022 2:02 am TECHNIQUE: CTA of the chest was performed after the administration of intravenous contrast.  Multiplanar reformatted images are provided for review. MIP images are provided for review. Automated exposure control, iterative reconstruction, and/or weight based adjustment of the mA/kV was utilized to reduce the radiation dose to as low as reasonably achievable. COMPARISON: 05/02/2022 HISTORY: ORDERING SYSTEM PROVIDED HISTORY: evaluate for PE TECHNOLOGIST PROVIDED HISTORY: Reason for exam:->evaluate for PE Decision Support Exception - unselect if not a suspected or confirmed emergency medical condition->Emergency Medical Condition (MA) What reading provider will be dictating this exam?->CRC FINDINGS: Pulmonary Arteries: Pulmonary arteries are adequately opacified for evaluation. No evidence of intraluminal filling defect to suggest pulmonary embolism. Main pulmonary artery is normal in caliber. Mediastinum: No evidence of mediastinal lymphadenopathy. Tiny calcified right hilar lymph node. The heart and pericardium demonstrate no acute abnormality. There is no acute abnormality of the thoracic aorta. Lungs/pleura: The lungs are without acute process. No focal consolidation or pulmonary edema. No evidence of pleural effusion or pneumothorax. Upper Abdomen: Questionable stones in the gallbladder. A 0.2 cm nonobstructing stone in the left kidney.  Soft Tissues/Bones: No acute bone or soft information in the EHR was reviewed. Impression:  1. Intractable headache: Progressively worsening occipital area headache with anterior spread to the bitemporal regions and bifrontal region associated with eye pain and photosensitivity; sharp pain with associated eye pain and photosensitivity with pain starting in the back of the head and moving forward with prominent neck pain and neck tenderness on palpation suggestive of possible cervicogenic tension type headache migraine component. Cannot exclude possibility of increased intracranial pressure given patient's age, sex, and body habitus. Will start with headache cocktail to resolve headache with consideration of lumbar puncture pending response. Patient very reports reduction in headache with slowing down and worsening with sitting standing and walking. 2. Intractable chest pain: Associated with headache pain in this morbidly obese female; no indication of cardiac chest pain and patient with history of bradycardia. Principal Problem:    Intractable headache  Resolved Problems:    * No resolved hospital problems. *      Recommendations:                                            1. Headache cocktail of Decadron 10 mg, sumatriptan 6 mg IM/SC, diazepam 5 mg, Phenergan 25 mg, Benadryl 50 mg, and Dilaudid 2 mg  2. Robaxin 1000 mg 3 times daily. 3. Mobic 15 mg daily for 2 weeks  4. Consider for lumbar puncture pending response to medication therapy to assess for  5. Further pending response to therapy. It was my pleasure to evaluate [address] [place] [address] today. Please call with questions.       Electronically signed by Giorgi Knox MD on 6/11/2022 at 11:20 AM

## 2022-06-11 NOTE — PLAN OF CARE
Attempted to call patient's mother for update, she was unavailable. Message left that she can call me back at her convenience.     Gilbert Luna M.D.  PGY-1 Internal Medicine    Electronically signed by Gilbert Luna MD on 6/11/2022 at 3:44 PM

## 2022-06-11 NOTE — PROGRESS NOTES
[facility] Residency Program  Progress Note - House Team     Patient:  Bao Joy 36 y.o. female MRN: [id number]     Date of Service: 6/11/2022     CC: headache  Overnight events: none    Subjective     Patient was seen and examined this morning at bedside in no acute distress. Overnight no acute events. This morning, patient complains of intractable headache which she states has not lessened in severity overnight. She also complains of left sided chest wall tenderness just below the left clavicle. She states her eyes hurt the most, and are most affected by the headache at this time. Objective     Physical Exam:  · Vitals: /86   Pulse 53   Temp 97.6 °F (36.4 °C) (Oral)   Resp 18   Ht 5' 4\" (1.626 m)   Wt 190 lb (86.2 kg)   LMP 05/16/2022   SpO2 95%   BMI 32.61 kg/m²     · I & O - 24hr: No intake/output data recorded. · General Appearance: alert and moderate distress  · HEENT:  Head: Normocephalic, no lesions, without obvious abnormality. Temples non-tender to palpation. · Neck: no JVD and exquisite tenderness to palpation of posterior and lateral left neck  · Lung: clear to auscultation bilaterally  · Heart: regular rate and rhythm, S1, S2 normal, no murmur, click, rub or gallop and left upper chest wall tender to palpation  · Abdomen: soft, non-tender; bowel sounds normal; no masses,  no organomegaly  · Extremities:  extremities normal, atraumatic, no cyanosis or edema  · Musculokeletal: No joint swelling, no muscle tenderness. ROM normal in all joints of extremities.    · Neurologic: Mental status: Alert, oriented, thought content appropriate  Subject  Pertinent Labs & Imaging Studies   dean  CBC with Differential:    Lab Results   Component Value Date    WBC 5.8 06/11/2022    RBC 3.81 06/11/2022    HGB 12.2 06/11/2022    HCT 35.7 06/11/2022     06/11/2022    MCV 93.7 06/11/2022    MCH 32.0 06/11/2022    MCHC 34.2 06/11/2022    RDW 12.9 06/11/2022    LYMPHOPCT 44.4 06/11/2022    MONOPCT 9.2 06/11/2022    BASOPCT 0.3 06/11/2022    MONOSABS 0.53 06/11/2022    LYMPHSABS 2.56 06/11/2022    EOSABS 0.20 06/11/2022    BASOSABS 0.02 06/11/2022     BMP:    Lab Results   Component Value Date     06/11/2022    K 3.5 06/11/2022     06/11/2022    CO2 21 06/11/2022    BUN 9 06/11/2022    LABALBU 4.0 06/09/2022    CREATININE 0.9 06/11/2022    CALCIUM 9.2 06/11/2022    GFRAA >60 06/11/2022    LABGLOM >60 06/11/2022    GLUCOSE 120 06/11/2022     Magnesium:    Lab Results   Component Value Date    MG 1.9 06/11/2022     Phosphorus:  No results found for: PHOS    MRI BRAIN W WO CONTRAST   Final Result   Unremarkable MRA and MRV of the brain. RECOMMENDATIONS:   Unavailable         MRV HEAD WO CONTRAST   Final Result   Unremarkable MRA and MRV of the brain. RECOMMENDATIONS:   Unavailable         CT HEAD WO CONTRAST   Final Result   No acute intracranial abnormality. CTA CHEST W CONTRAST   Final Result   No acute finding in the chest with no evidence of pulmonary embolism. Questionable cholelithiasis. Ultrasound may be obtained to confirm. A tiny nonobstructing stone in the left kidney. XR CHEST PORTABLE   Final Result   Cardiomegaly. No additional acute findings. Resident's Assessment and Plan     Assessment and Plan:    1. Intractible Headache 2/2 status migranosus vs ?malingering vs ?psychogenic  1. MRI brain and MRV head negative, no evidence of pathology nor of thrombus   2. CRP mildly elevated  3. Sed rate elevated to 110  4. Trial of cyclobenzaprine today as neck muscles are tender  5. Neuro consulted by admitting team, will await recommendations  2. Chest pain? R/o MSK vs Malingering  1. No EKG changes, troponin WNL  2. CTA chest wnl  3. Pre-syncopal episode in ED  1. Obtain orthostatic vitals  4. Hx of Polysubstance abuse (cocaine, Amphetamine, tobacco)  1. SDS WNL  2. UDS pending   3.  Methadone dose confirmed with [place]: 100mg daily         PT/OT evaluation: consulted  DVT prophylaxis/ GI prophylaxis: lovenox 40mg qd  Disposition: continue current care    Farrah Hurst MD, PGY-1  Attending physician: Dr. Miguel Angel Go  [facility]    Attending Physician Statement:  Ro Sparks M.D., F.A.C.P. I have discussed the case, including pertinent history and exam findings with the resident/NP. I have seen and examined the patient and the key elements of the encounter have been performed by me. I agree with the resident ROS, PMHx, PSHx, meds reviewed and assessment, plan and orders as documented by the resident/NP      Hospital charts reviewed, including other providers notes, relevant labs and imaging. First episode severe HA  Neg MRI, MRV- no clot etc..  Severe HA, story changes, photophobia? Claims can't stand up in ER but seen walking around brightly lit rooms in ER  Can't get orthostatics done    New baby at home, ?emontional decompensation   Based on duration - I doubt meningitis  +neck pain noted last admission- rule out HSV  Or viral-- request respiratory panel  Rule out migraine  Rule out intracranial HTN    likley chest pain/neck pain- similar to prior admission - workup last admission neg  Neuro consulted:  5. Headache cocktail of Decadron 10 mg, sumatriptan 6 mg IM/SC, diazepam 5 mg, Phenergan 25 mg, Benadryl 50 mg, and Dilaudid 2 mg  6. Robaxin 1000 mg 3 times daily. 7. Mobic 15 mg daily for 2 weeks  8.  Consider for lumbar puncture pending response to medication therapy     Last admission also seemed inappropriate, pyshc and chronic methadone  wouldn't get out of bed, UTI/nausea  - sinus reji- at that time due to excess clonidine/ (+/methadone)-- inappropriately taking too much clonidine prior to last admission  Now   Mild sinus reji now, awake 59-60-- lowest 45 while asleep    >50% of time spent coordinating care with other providers and/or counseling patient/family  Remainder of medical problems as per resident note.

## 2022-06-11 NOTE — PLAN OF CARE
PS received from [address] [place] [address] Drive who is caring for patient: patient requesting something for headache pain. Pt agrees to try tylenol for now, and informed that next dose of robaxin is 2100 which should appropriately correct the neck muscle tension related to her headache. RN at bedside during conversation. We will monitor for now, if requires something before 2100 dose of robaxin, please reach out to me on [employer] if needed.       Electronically signed by Steven Norris MD on 6/11/2022 at 7:24 PM

## 2022-06-11 NOTE — PLAN OF CARE
Problem: Pain  Goal: Verbalizes/displays adequate comfort level or baseline comfort level  6/11/2022 0231 by Leana Adhikari RN  Outcome: Progressing  6/10/2022 1731 by Brenda Villarreal RN  Outcome: Progressing     Problem: Safety - Adult  Goal: Free from fall injury  6/11/2022 0231 by Leana Adhikari RN  Outcome: Progressing  6/10/2022 1731 by Brenda Villarreal RN  Outcome: Progressing     Problem: Discharge Planning  Goal: Discharge to home or other facility with appropriate resources  Outcome: Progressing  Flowsheets (Taken 6/10/2022 1702 by Brenda Villarreal RN)  Discharge to home or other facility with appropriate resources:   Identify barriers to discharge with patient and caregiver   Arrange for needed discharge resources and transportation as appropriate   Identify discharge learning needs (meds, wound care, etc)   Refer to discharge planning if patient needs post-hospital services based on physician order or complex needs related to functional status, cognitive ability or social support system

## 2022-06-12 VITALS
SYSTOLIC BLOOD PRESSURE: 120 MMHG | TEMPERATURE: 98.4 F | HEIGHT: 64 IN | DIASTOLIC BLOOD PRESSURE: 74 MMHG | OXYGEN SATURATION: 96 % | WEIGHT: 190 LBS | HEART RATE: 51 BPM | RESPIRATION RATE: 18 BRPM | BODY MASS INDEX: 32.44 KG/M2

## 2022-06-12 LAB
ANION GAP SERPL CALCULATED.3IONS-SCNC: 16 MMOL/L (ref 7–16)
BASOPHILS ABSOLUTE: 0.02 E9/L (ref 0–0.2)
BASOPHILS RELATIVE PERCENT: 0.2 % (ref 0–2)
BUN BLDV-MCNC: 10 MG/DL (ref 6–20)
CALCIUM SERPL-MCNC: 10 MG/DL (ref 8.6–10.2)
CHLORIDE BLD-SCNC: 103 MMOL/L (ref 98–107)
CO2: 19 MMOL/L (ref 22–29)
CREAT SERPL-MCNC: 0.7 MG/DL (ref 0.5–1)
EOSINOPHILS ABSOLUTE: 0 E9/L (ref 0.05–0.5)
EOSINOPHILS RELATIVE PERCENT: 0 % (ref 0–6)
GFR AFRICAN AMERICAN: >60
GFR NON-AFRICAN AMERICAN: >60 ML/MIN/1.73
GLUCOSE BLD-MCNC: 137 MG/DL (ref 74–99)
HCT VFR BLD CALC: 41.3 % (ref 34–48)
HEMOGLOBIN: 14.1 G/DL (ref 11.5–15.5)
IMMATURE GRANULOCYTES #: 0.04 E9/L
IMMATURE GRANULOCYTES %: 0.4 % (ref 0–5)
LYMPHOCYTES ABSOLUTE: 1.43 E9/L (ref 1.5–4)
LYMPHOCYTES RELATIVE PERCENT: 13.3 % (ref 20–42)
MCH RBC QN AUTO: 31.5 PG (ref 26–35)
MCHC RBC AUTO-ENTMCNC: 34.1 % (ref 32–34.5)
MCV RBC AUTO: 92.4 FL (ref 80–99.9)
METER GLUCOSE: 131 MG/DL (ref 74–99)
MONOCYTES ABSOLUTE: 0.61 E9/L (ref 0.1–0.95)
MONOCYTES RELATIVE PERCENT: 5.7 % (ref 2–12)
NEUTROPHILS ABSOLUTE: 8.67 E9/L (ref 1.8–7.3)
NEUTROPHILS RELATIVE PERCENT: 80.4 % (ref 43–80)
PDW BLD-RTO: 12.6 FL (ref 11.5–15)
PLATELET # BLD: 321 E9/L (ref 130–450)
PMV BLD AUTO: 10.3 FL (ref 7–12)
POTASSIUM REFLEX MAGNESIUM: 3.8 MMOL/L (ref 3.5–5)
RBC # BLD: 4.47 E12/L (ref 3.5–5.5)
SODIUM BLD-SCNC: 138 MMOL/L (ref 132–146)
WBC # BLD: 10.8 E9/L (ref 4.5–11.5)

## 2022-06-12 PROCEDURE — 96375 TX/PRO/DX INJ NEW DRUG ADDON: CPT

## 2022-06-12 PROCEDURE — 99226 PR SBSQ OBSERVATION CARE/DAY 35 MINUTES: CPT | Performed by: INTERNAL MEDICINE

## 2022-06-12 PROCEDURE — 85025 COMPLETE CBC W/AUTO DIFF WBC: CPT

## 2022-06-12 PROCEDURE — 6360000002 HC RX W HCPCS: Performed by: STUDENT IN AN ORGANIZED HEALTH CARE EDUCATION/TRAINING PROGRAM

## 2022-06-12 PROCEDURE — 96372 THER/PROPH/DIAG INJ SC/IM: CPT

## 2022-06-12 PROCEDURE — 6370000000 HC RX 637 (ALT 250 FOR IP): Performed by: STUDENT IN AN ORGANIZED HEALTH CARE EDUCATION/TRAINING PROGRAM

## 2022-06-12 PROCEDURE — 82962 GLUCOSE BLOOD TEST: CPT

## 2022-06-12 PROCEDURE — C9113 INJ PANTOPRAZOLE SODIUM, VIA: HCPCS | Performed by: STUDENT IN AN ORGANIZED HEALTH CARE EDUCATION/TRAINING PROGRAM

## 2022-06-12 PROCEDURE — 6370000000 HC RX 637 (ALT 250 FOR IP): Performed by: PSYCHIATRY & NEUROLOGY

## 2022-06-12 PROCEDURE — 6370000000 HC RX 637 (ALT 250 FOR IP): Performed by: INTERNAL MEDICINE

## 2022-06-12 PROCEDURE — 2580000003 HC RX 258: Performed by: STUDENT IN AN ORGANIZED HEALTH CARE EDUCATION/TRAINING PROGRAM

## 2022-06-12 PROCEDURE — 36415 COLL VENOUS BLD VENIPUNCTURE: CPT

## 2022-06-12 PROCEDURE — A4216 STERILE WATER/SALINE, 10 ML: HCPCS | Performed by: STUDENT IN AN ORGANIZED HEALTH CARE EDUCATION/TRAINING PROGRAM

## 2022-06-12 PROCEDURE — G0378 HOSPITAL OBSERVATION PER HR: HCPCS

## 2022-06-12 PROCEDURE — 80048 BASIC METABOLIC PNL TOTAL CA: CPT

## 2022-06-12 RX ORDER — SUMATRIPTAN 6 MG/.5ML
6 INJECTION, SOLUTION SUBCUTANEOUS ONCE
Status: COMPLETED | OUTPATIENT
Start: 2022-06-12 | End: 2022-06-12

## 2022-06-12 RX ORDER — SUMATRIPTAN 50 MG/1
50 TABLET, FILM COATED ORAL PRN
Qty: 8 TABLET | Refills: 0 | Status: SHIPPED | OUTPATIENT
Start: 2022-06-12

## 2022-06-12 RX ORDER — TOPIRAMATE 25 MG/1
25 TABLET ORAL NIGHTLY
Qty: 60 TABLET | Refills: 0 | Status: SHIPPED | OUTPATIENT
Start: 2022-06-12

## 2022-06-12 RX ORDER — CLONIDINE HYDROCHLORIDE 0.2 MG/1
0.1 TABLET ORAL 2 TIMES DAILY
Qty: 60 TABLET | Refills: 1 | Status: SHIPPED | OUTPATIENT
Start: 2022-06-12

## 2022-06-12 RX ADMIN — METHOCARBAMOL 1000 MG: 500 TABLET ORAL at 09:34

## 2022-06-12 RX ADMIN — SODIUM CHLORIDE, PRESERVATIVE FREE 10 ML: 5 INJECTION INTRAVENOUS at 09:34

## 2022-06-12 RX ADMIN — Medication 100 MG: at 09:34

## 2022-06-12 RX ADMIN — SUMATRIPTAN 6 MG: 6 INJECTION SUBCUTANEOUS at 13:21

## 2022-06-12 RX ADMIN — QUETIAPINE FUMARATE 100 MG: 100 TABLET ORAL at 09:34

## 2022-06-12 RX ADMIN — SODIUM CHLORIDE 40 MG: 9 INJECTION, SOLUTION INTRAMUSCULAR; INTRAVENOUS; SUBCUTANEOUS at 11:31

## 2022-06-12 RX ADMIN — ACETAMINOPHEN 325MG 650 MG: 325 TABLET ORAL at 06:19

## 2022-06-12 RX ADMIN — MELOXICAM 15 MG: 7.5 TABLET ORAL at 09:34

## 2022-06-12 ASSESSMENT — PAIN SCALES - GENERAL: PAINLEVEL_OUTOF10: 10

## 2022-06-12 NOTE — PLAN OF CARE
Problem: Pain  Goal: Verbalizes/displays adequate comfort level or baseline comfort level  6/12/2022 1254 by Robyn Mcdermott RN  Outcome: Completed  6/12/2022 0031 by Jodi Adkins RN  Outcome: Progressing     Problem: Safety - Adult  Goal: Free from fall injury  6/12/2022 1254 by Robyn Mcdermott RN  Outcome: Completed  6/12/2022 0031 by Jodi Adkins RN  Outcome: Progressing     Problem: Discharge Planning  Goal: Discharge to home or other facility with appropriate resources  Outcome: Completed  Flowsheets (Taken 6/12/2022 5432)  Discharge to home or other facility with appropriate resources:   Identify barriers to discharge with patient and caregiver   Arrange for needed discharge resources and transportation as appropriate

## 2022-06-12 NOTE — PROGRESS NOTES
[facility]  Internal Medicine Residency Program  Progress Note - House Team     Patient:  Reginald Hernández 36 y.o. female MRN: [id number]     Date of Service: 6/12/2022     CC: headache  Overnight events: none    Subjective     Patient was seen and examined this morning at bedside   Overnight no acute events. This morning, patient continues to complains of intractable headache. Patient states that the headache cocktail did relieve the pain temporarily but now the headache has resumed and she states it is worsened today relative to the day prior. She continues to also complain of L. Sided chest pain. The chest pain is reproducible with touch. Objective     Physical Exam:  · Vitals: BP (!) 124/95   Pulse (!) 45   Temp 97.8 °F (36.6 °C) (Oral)   Resp 18   Ht 5' 4\" (1.626 m)   Wt 190 lb (86.2 kg)   LMP 05/16/2022   SpO2 96%   BMI 32.61 kg/m²     · I & O - 24hr: No intake/output data recorded. · General Appearance: alert and moderate distress  · HEENT:  Head: Normocephalic, no lesions, without obvious abnormality. Temples non-tender to palpation. · Neck: no JVD and exquisite tenderness to palpation of posterior and lateral left neck  · Lung: clear to auscultation bilaterally  · Heart: regular rate and rhythm, S1, S2 normal, no murmur, click, rub or gallop and left upper chest wall tender to palpation  · Abdomen: soft, non-tender; bowel sounds normal; no masses,  no organomegaly  · Extremities:  extremities normal, atraumatic, no cyanosis or edema  · Musculokeletal: No joint swelling, no muscle tenderness. ROM normal in all joints of extremities.    · Neurologic: Mental status: Alert, oriented, thought content appropriate  Subject  Pertinent Labs & Imaging Studies   dean  CBC with Differential:    Lab Results   Component Value Date    WBC 10.8 06/12/2022    RBC 4.47 06/12/2022    HGB 14.1 06/12/2022    HCT 41.3 06/12/2022     06/12/2022    MCV 92.4 06/12/2022    MCH 31.5 06/12/2022    MCHC 34.1 06/12/2022    RDW 12.6 06/12/2022    LYMPHOPCT 13.3 06/12/2022    MONOPCT 5.7 06/12/2022    BASOPCT 0.2 06/12/2022    MONOSABS 0.61 06/12/2022    LYMPHSABS 1.43 06/12/2022    EOSABS 0.00 06/12/2022    BASOSABS 0.02 06/12/2022     BMP:    Lab Results   Component Value Date     06/12/2022    K 3.8 06/12/2022     06/12/2022    CO2 19 06/12/2022    BUN 10 06/12/2022    LABALBU 4.0 06/09/2022    CREATININE 0.7 06/12/2022    CALCIUM 10.0 06/12/2022    GFRAA >60 06/12/2022    LABGLOM >60 06/12/2022    GLUCOSE 137 06/12/2022     Magnesium:    Lab Results   Component Value Date    MG 1.9 06/11/2022     Phosphorus:  No results found for: PHOS    MRI BRAIN W WO CONTRAST   Final Result   Unremarkable MRA and MRV of the brain. RECOMMENDATIONS:   Unavailable         MRV HEAD WO CONTRAST   Final Result   Unremarkable MRA and MRV of the brain. RECOMMENDATIONS:   Unavailable         CT HEAD WO CONTRAST   Final Result   No acute intracranial abnormality. CTA CHEST W CONTRAST   Final Result   No acute finding in the chest with no evidence of pulmonary embolism. Questionable cholelithiasis. Ultrasound may be obtained to confirm. A tiny nonobstructing stone in the left kidney. XR CHEST PORTABLE   Final Result   Cardiomegaly. No additional acute findings. Resident's Assessment and Plan     Assessment and Plan:    1. Intractible Headache 2/2 status migranosus vs ?malingering vs ?psychogenic  1. MRI brain and MRV head negative, no evidence of pathology nor of thrombus   2. CRP mildly elevated  3. Sed rate elevated to 110  4. Trial of cyclobenzaprine today as neck muscles are tender  5. Neuro consulted by admitting team, will await recommendations  1. Patient has been given headache cocktail with mild and temporary relief but the headache is now back to her baseline upon presentation or possible slightly worse per patient  2.  Per Neuro, considering for LP dependent on response to medical therapy. Will continue to follow neuro recs  2. Chest pain? R/o MSK vs Malingering  1. No EKG changes, troponin WNL  2. CTA chest wnl  3. Pre-syncopal episode in ED  1. Obtain orthostatic vitals  4. Hx of Polysubstance abuse (cocaine, Amphetamine, tobacco)  1. SDS WNL  2. UDS pending   3. Methadone dose confirmed with [place]: 100mg daily         PT/OT evaluation: consulted  DVT prophylaxis/ GI prophylaxis: lovenox 40mg qd  Disposition: continue current care    John Cronin MD, PGY-1  Attending physician: Dr. Archana Menard  Attending Physician Statement:  Ran Hurst M.D., F.A.C.P.     I have discussed the case, including pertinent history and exam findings with the resident/NP. I have seen and examined the patient and the key elements of the encounter have been performed by me. I agree with the resident ROS, PMHx, PSHx, meds reviewed and assessment, plan and orders as documented by the resident/NP    [facility] charts reviewed, including other providers notes, relevant labs and imaging.      First episode severe HA  Neg MRI, MRV- no clot etc..  Severe HA, story changes, photophobia? Claims can't stand up in ER but seen walking around brightly lit rooms in ER  Can't get orthostatics done       Last admission also seemed \"inappropriate\" as well  Pain issues-, pyshc and chronic methadone  wouldn't get out of bed, UTI/nausea  - sinus reji- at that time due to excess clonidine/ (+/methadone)-- inappropriately taking too much clonidine prior to last admission  Now   Mild sinus reji now, awake 59-60-- lowest 45 while asleep    New baby at home, ?emontional decompensation   Mandy Thakur is telling her mother that she is here for open heart surgery and that she is going to die\"-- despite no one telling her these things  Definite psych issues. -- somatization vs emotional decompensation    Based on duration - I doubt meningitis  +neck pain noted last admission- rule out HSV  Or viral-- request respiratory panel  Rule out migraine  Rule out intracranial HTN   -- LP pending with opening pressure  likley chest pain/neck pain- similar to prior admission - workup last admission neg  Neuro consulted:  5. Headache cocktail of Decadron 10 mg, sumatriptan 6 mg IM/SC, diazepam 5 mg, Phenergan 25 mg, Benadryl 50 mg, and Dilaudid 2 mg  6. Robaxin 1000 mg 3 times daily. 7. Mobic 15 mg daily for 2 weeks  8.  Per neuro--Consider for lumbar puncture pending response to medication therapy    ??will defer to neuro if LP  If NOT DC soon.     Now complaining of gerd- trial PPI  >50% of time spent coordinating care with other providers and/or counseling patient/family  Remainder of medical problems as per resident note

## 2022-06-12 NOTE — DISCHARGE SUMMARY
[address]  Discharge Summary    PCP: No primary care provider on file. Admit Date:6/9/2022  Discharge Date: 6/12/2022    Admission Diagnosis:   1. Intractible Headache 2/2 status migranosus vs structural abnormality vs malignering? 2. Chest pain? R/o MSK vs Malingering. Cardiac wkup (-)  3. Hx of Polysubstance abuse (cocaine, Amphetamine, tobacco)  1. Discharge Diagnosis:  4. Intractible Headache 2/2 status migranosus vs ?malingering vs ?psychogenic - improved  5. Chest pain? R/o MSK vs Malingering  6. Pre-syncopal episode in ED  7. Hx of Polysubstance abuse (cocaine, Amphetamine, tobacco)      Hospital Course: The patient is a 36 y.o. female with a past medical history of this (cocaine, amphetamine, tobacco), obesity, preeclampsia, multiple drug overdoses, mood disorder, recent admission between 5/2/2022 - 5/10/2022 for concerns of sinus bradycardia and chest pain which was resolved with discontinuing medications that induce bradycardia who presents to the ED today for concerns of chest pain as well as headache. Patient states that both the complaints of chest pain and headache have been present since patient's previous discharge on 5/10. In regard to the chest pain patient states that the pain is located in the left chest region, substernal, is continuously present, is stated to be a sharp pain, is not worsened by anything but relieved with lying down, does not radiate, and is rated as 10/10 patient states. In regards to patient's concerns for headache this is also been present since previous discharge on 5/10. Patient states that the pain is located in the mid occipital region and is continuous pain as well. Patient states that this is a sharp pain with radiation to the frontal region bilaterally. Patient states that the pain is always present but waxes and wanes in intensity periodically with no inciting causes.   Patient states that bright lights worsen the pain and lying down improves and it. Patient also notes that she has feelings of lightheaded and dizziness associated during this time when she stands from a sitting position. Patient states that the symptoms have been progressively worsening which is what brought her to the ED.     In the ED, vitals seen to be overall stable with HR: 58, RR: 16, BP: 154/97. CMP overall nonsignificant. Troponins:<6 x2, CBC also overall WNL. D-dimer was seen to be 340. CTA chest was done which was negative for PE.  CXR was also done which showed no acute findings. For patient's headache she was given Compazine 10 mg IV, Benadryl 25 mg IV, Toradol 15 mg IV, Decadron 6 mg IV with minimal to no relief per patient. Patient continues to complain of headache despite treatment and was admitted to the floors for further management. While on the floors neurology was consulted who recommended Headache cocktail of Decadron 10 mg, sumatriptan 6 mg IM/SC, diazepam 5 mg, Phenergan 25 mg, Benadryl 50 mg, and Dilaudid 2 mg. Patient stated that this provided temporary, mild relief but that her headache has worsened again. The next day patient states that her headache is now feeling better but still present. Patient is adamant to go home as she states she has a child a home to take care of. Patient's condition overall improving. Discharge planning discussed. Patient to follow up with PCP for hospital follow up as well as to see neurology 2 weeks after discharge for follow up. Patient to also start on Topamax and to be discharged on PRN Sumatriptan. Lastly, in concerns for bradycardia, patient to decrease home Clonidine dosage to 0.1mg. Discharge planning discussed with patient. She admitted understanding and was able to repeat instructions.       Significant findings (history and exam, laboratory, radiological, pathology, other tests):   · General Appearance: alert and moderate distress  · HEENT:  Head: Normocephalic, no lesions, without obvious abnormality. Temples non-tender to palpation. · Neck: no JVD and exquisite tenderness to palpation of posterior and lateral left neck  · Lung: clear to auscultation bilaterally  · Heart: regular rate and rhythm, S1, S2 normal, no murmur, click, rub or gallop and left upper chest wall tender to palpation  · Abdomen: soft, non-tender; bowel sounds normal; no masses,  no organomegaly  · Extremities:  extremities normal, atraumatic, no cyanosis or edema  · Musculokeletal: No joint swelling, no muscle tenderness. ROM normal in all joints of extremities. · Neurologic: Mental status: Alert, oriented, thought content appropriate  ·     Pending test results:   1. N/A    Consults:  1. Neurology    Procedures:  1. N/A    Condition at discharge: Stable    Disposition: home    Outpatient Recommendations:  1. Please follow up on concerns of headache and if PRN sumatriptan relieves her pain  2. Follow for tolerance of Clonidine now decreased to 0.1mg BID    Discharge Medications:     Medication List      START taking these medications    SUMAtriptan 50 MG tablet  Commonly known as: IMITREX  Take 1 tablet by mouth as needed for Migraine     topiramate 25 MG tablet  Commonly known as:  Topamax  Take 1 tablet by mouth nightly        CHANGE how you take these medications    cloNIDine 0.2 MG tablet  Commonly known as: CATAPRES  Take 0.5 tablets by mouth 2 times daily  What changed: how much to take        CONTINUE taking these medications    methadone 10 MG/ML solution  Commonly known as: DOLOPHINE     QUEtiapine 100 MG tablet  Commonly known as: SEROQUEL  Take 1 tablet by mouth 2 times daily        STOP taking these medications    ketorolac 10 MG tablet  Commonly known as: TORADOL           Where to Get Your Medications      These medications [facility] to [facility] [address] [place] [address] Jael  Trae, [address] [facility] [phone]   Romelia Perry De Farshadis, [address] [place] [id number]    Phone: [phone]   · cloNIDine 0.2 MG tablet  · SUMAtriptan 50 MG tablet  · topiramate 25 MG tablet          [facility] Internal Medicine Resident Service     Activity as tolerated  Diet: common adult  Be compliant with your medications and take them as prescribed.     Special Instructions:   Please DECREASE dose of Clonidine to 0.1mg twice a day  Please START taking Sumatriptan 50mg AS NEEDED for episodes of Migraine  Please START taking Topamax 25mg daily   Please follow up with PCP within 1 week after discharge  Please follow up with Neurology in 2 weeks after discharge        Hospital Follow up: [id number] [address] with House Team   Call to confirm appointment Tel: [phone] ([address]  Internal Medicine Clinic)      Other Follow-Ups:     No future appointments.     Other than any new prescriptions given to you today, the list of home going meds on this After Visit Summary are based on information provided to us from you. This information, including the list, dose, and frequency of medications is only as accurate as the information you provided. If you have any questions or concerns about your home medications, please contact your Primary Care Physician for further clarification.     Tray Tillman MD  PGY-1   12:47 PM 6/12/2022

## 2022-06-13 LAB
HEPATITIS C ANTIBODY INTERPRETATION: NORMAL
HIV-1 AND HIV-2 ANTIBODIES: NORMAL

## 2022-06-29 ENCOUNTER — HOSPITAL ENCOUNTER (EMERGENCY)
Age: 32
Discharge: HOME | End: 2022-06-29
Payer: MEDICAID

## 2022-06-29 VITALS
OXYGEN SATURATION: 98 % | TEMPERATURE: 98.5 F | DIASTOLIC BLOOD PRESSURE: 114 MMHG | RESPIRATION RATE: 18 BRPM | HEART RATE: 102 BPM | SYSTOLIC BLOOD PRESSURE: 152 MMHG

## 2022-06-29 DIAGNOSIS — F11.20 METHADONE MAINTENANCE THERAPY PATIENT (HCC): Primary | ICD-10-CM

## 2022-06-29 PROCEDURE — 99283 EMERGENCY DEPT VISIT LOW MDM: CPT

## 2022-06-29 PROCEDURE — 6370000000 HC RX 637 (ALT 250 FOR IP): Performed by: PHYSICIAN ASSISTANT

## 2022-06-29 RX ORDER — METHADONE HYDROCHLORIDE 10 MG/ML
100 CONCENTRATE ORAL ONCE
Status: COMPLETED | OUTPATIENT
Start: 2022-06-29 | End: 2022-06-29

## 2022-06-29 RX ADMIN — Medication 100 MG: at 17:42

## 2022-06-29 NOTE — ED PROVIDER NOTES
[facility]  Department of Emergency Medicine   ED  Encounter Note  Admit Date/RoomTime: 2022  5:05 PM  ED Room: [place] [address]  NAME: Ivan Butler  : 1990  MRN: [id number]     Chief Complaint:  Other (missed appt for methadone today, restlessness )    HISTORY OF PRESENT ILLNESS        Ivan Butler is a 36 y.o. female who presents to the ED by private vehicle for needing her daily dose of methadone which she missed. Patient states she recently switched from [facility] to [facility] where she has been receiving 100 mg of methadone daily and oral suspension. She states that the different facilities operate under different hours and she was accustomed to being at the facility before 130 which now closes at 1230. She had been seen here on 1 previous occasion on  and her methadone dose was verified. She has no acute complaints at this time. ROS   Pertinent positives and negatives are stated within HPI, all other systems reviewed and are negative. Past Medical History:  has a past medical history of Abnormal Pap smear of cervix, Anemia, Drug abuse ([address] Obesity, Overdose, and Preeclampsia complicating hypertension. Surgical History:  has no past surgical history on file. Social History:  reports that she has been smoking cigarettes. She has a 6.50 pack-year smoking history. She has never used smokeless tobacco. She reports previous drug use. Drug: Methamphetamines (Crystal Meth). She reports that she does not drink alcohol. Family History: family history is not on file. Allergies: Patient has no known allergies. PHYSICAL EXAM   Oxygen Saturation Interpretation: Normal on room air analysis.         ED Triage Vitals   BP Temp Temp Source Heart Rate Resp SpO2 Height Weight   22 1657 22 1655 22 1655 22 1655 22 1657 22 1655 -- --   (!) 152/114 98.5 °F (36.9 °C) Oral (!) 102 18 98 %           Physical Exam  Constitutional/General: Alert and oriented x3, well appearing, non toxic  HEENT:  NC/NT. PERRLA,  Airway patent. Neck: Supple, full ROM, non tender to palpation in the midline, no stridor, no crepitus, no meningeal signs  Respiratory: Lungs clear to auscultation bilaterally, no wheezes, rales, or rhonchi. Not in respiratory distress  CV:  Regular rate. Regular rhythm. No murmurs, gallops, or rubs. 2+ distal pulses  GI:  Abdomen Soft, Non tender, Non distended. +BS. No rebound, guarding, or rigidity. No pulsatile masses. Musculoskeletal: Moves all extremities x 4. Warm and well perfused, no clubbing, cyanosis, or edema. Capillary refill <3 seconds  Integument: skin warm and dry. No rashes. Neurologic: GCS 15, no focal deficits. Psychiatric: Normal Affect    Lab / Imaging Results   (All laboratory and radiology results have been personally reviewed by myself)  Labs:  No results found for this visit on 06/29/22. Imaging: All Radiology results interpreted by Radiologist unless otherwise noted. No orders to display       ED Course / Medical Decision Making     Medications   methadone (DOLOPHINE) 10 MG/ML solution 100 mg (has no administration in time range)       MDM:   Patient presents for the need of methadone after she missed your appointment today at. She takes normally 100 mg daily. Since her dose was verified from previous encounter we will provide her her daily dose. She was encouraged to keep her appointments and follow-up at the methadone clinic daily. Plan of Care/Counseling:  Davion Salas reviewed today's visit with the patient in addition to providing specific details for the plan of care and counseling regarding the diagnosis and prognosis. Questions are answered at this time and are agreeable with the plan. ASSESSMENT     1. Methadone maintenance therapy patient [facility])      PLAN   Discharged home.   Patient condition is good    New Medications     New Prescriptions    No medications on file     Electronically signed by SEYMOUR Worrell   DD: 6/29/22  **This report was transcribed using voice recognition software. Every effort was made to ensure accuracy; however, inadvertent computerized transcription errors may be present.   END OF ED PROVIDER NOTE     Davion Pringle  06/29/22 4146

## 2022-08-16 ENCOUNTER — HOSPITAL ENCOUNTER (EMERGENCY)
Age: 32
Discharge: HOME | End: 2022-08-16
Payer: MEDICAID

## 2022-08-16 VITALS
OXYGEN SATURATION: 98 % | RESPIRATION RATE: 20 BRPM | HEART RATE: 75 BPM | DIASTOLIC BLOOD PRESSURE: 104 MMHG | SYSTOLIC BLOOD PRESSURE: 120 MMHG | TEMPERATURE: 97.9 F

## 2022-08-16 DIAGNOSIS — F19.20 MEDICATION ADDICTION, CONTINUOUS (HCC): Primary | ICD-10-CM

## 2022-08-16 PROCEDURE — 6370000000 HC RX 637 (ALT 250 FOR IP): Performed by: PHYSICIAN ASSISTANT

## 2022-08-16 PROCEDURE — 99283 EMERGENCY DEPT VISIT LOW MDM: CPT

## 2022-08-16 RX ORDER — METHADONE HYDROCHLORIDE 10 MG/ML
100 CONCENTRATE ORAL ONCE
Status: COMPLETED | OUTPATIENT
Start: 2022-08-16 | End: 2022-08-16

## 2022-08-16 RX ADMIN — Medication 100 MG: at 13:05

## 2022-08-16 ASSESSMENT — PAIN SCALES - GENERAL: PAINLEVEL_OUTOF10: 8

## 2022-08-16 NOTE — ED PROVIDER NOTES
[address]  Department of Emergency Medicine   ED  Encounter Note  Admit Date/RoomTime: 2022 12:52 PM  ED Room: [place] [address]    NAME: Nancy Weir  : 1990  MRN: [id number]     Chief Complaint:  Other (Patient came to ED to get a methadone dose that she missed at there clinic. )    History of Present Illness      Nancy Weir is a 36 y.o. old female presents to the emergency department via by private vehicle requesting daily methadone dose as she missed her appointment today. Patient has been seen here now 5 times for the same complaint since the end of May stating that ever since she switched from 1 facility to [facility] she has a hard time getting to the clinic before they closed. She takes 100 mg of methadone daily. ROS   Pertinent positives and negatives are stated within HPI, all other systems reviewed and are negative. Past Medical History:  has a past medical history of Abnormal Pap smear of cervix, Anemia, Drug abuse ([address] Obesity, Overdose, and Preeclampsia complicating hypertension. Surgical History:  has no past surgical history on file. Social History:  reports that she has been smoking cigarettes. She has a 6.50 pack-year smoking history. She has never used smokeless tobacco. She reports that she does not currently use drugs after having used the following drugs: Methamphetamines (Crystal Meth). She reports that she does not drink alcohol. Family History: family history is not on file. Allergies: Patient has no known allergies. Physical Exam   Oxygen Saturation Interpretation: Normal.        ED Triage Vitals   BP Temp Temp Source Heart Rate Resp SpO2 Height Weight   22 1238 22 1224 22 1224 22 1224 22 1238 22 1224 -- --   (!) 120/104 97.9 °F (36.6 °C) Oral 90 20 98 %           Constitutional:  Alert, development consistent with age. HEENT:  NC/NT. Airway patent.   Neck: Normal ROM. Supple. Respiratory:  Clear to auscultation and breath sounds equal.    CV: Regular rate and rhythm, normal heart sounds, without pathological murmurs, ectopy, gallops, or rubs. GI:  Abdomen Soft, nontender, good bowel sounds. No firm or pulsatile mass. Integument:  No rashes, erythema present. Lymphatics: No lymphangitis or adenopathy noted. Neurological:  Oriented. Motor functions intact. Lab / Imaging Results   (All laboratory and radiology results have been personally reviewed by myself)  Labs:  No results found for this visit on 08/16/22. Imaging: All Radiology results interpreted by Radiologist unless otherwise noted. No orders to display       ED Course / Medical Decision Making     Medications   methadone (DOLOPHINE) 10 MG/ML solution 100 mg (100 mg Oral Given 8/16/22 4206)        Consults:   Clinical pharmacy consulted to verify patient dose and last administration date. Pharmacy confirmed last dose was provided yesterday and patient did not receive any medications today. Last dose was 100 mg of Dolphine. Counseling/MDM:   Patient was given medication in the ED after she missed her clinic appointment. No additional medications were provided. Assessment      1. Medication addiction, continuous ([facility] [address]      Plan   Discharged home. Patient condition is good    New Medications     New Prescriptions    No medications on file     Electronically signed by SEYMOUR Vásquez   DD: 8/16/22  **This report was transcribed using voice recognition software. Every effort was made to ensure accuracy; however, inadvertent computerized transcription errors may be present.   END OF ED PROVIDER NOTE       Davion Wallis  08/16/22 9208

## 2022-08-19 ENCOUNTER — HOSPITAL ENCOUNTER (EMERGENCY)
Age: 32
Discharge: HOME | End: 2022-08-19
Payer: MEDICAID

## 2022-08-19 VITALS
RESPIRATION RATE: 16 BRPM | SYSTOLIC BLOOD PRESSURE: 127 MMHG | BODY MASS INDEX: 45.58 KG/M2 | DIASTOLIC BLOOD PRESSURE: 79 MMHG | HEART RATE: 72 BPM | TEMPERATURE: 98 F | HEIGHT: 64 IN | WEIGHT: 267 LBS | OXYGEN SATURATION: 98 %

## 2022-08-19 DIAGNOSIS — F11.20 PATIENT ON METHADONE MAINTENANCE THERAPY (HCC): Primary | ICD-10-CM

## 2022-08-19 PROCEDURE — 99283 EMERGENCY DEPT VISIT LOW MDM: CPT

## 2022-08-19 PROCEDURE — 6370000000 HC RX 637 (ALT 250 FOR IP): Performed by: PHYSICIAN ASSISTANT

## 2022-08-19 RX ORDER — METHADONE HYDROCHLORIDE 10 MG/ML
100 CONCENTRATE ORAL ONCE
Status: COMPLETED | OUTPATIENT
Start: 2022-08-19 | End: 2022-08-19

## 2022-08-19 RX ADMIN — Medication 100 MG: at 13:25

## 2022-08-19 ASSESSMENT — PAIN DESCRIPTION - DESCRIPTORS: DESCRIPTORS: CRAMPING

## 2022-08-19 ASSESSMENT — PAIN DESCRIPTION - LOCATION: LOCATION: LEG

## 2022-08-19 ASSESSMENT — PAIN SCALES - GENERAL: PAINLEVEL_OUTOF10: 8

## 2022-08-19 ASSESSMENT — PAIN - FUNCTIONAL ASSESSMENT: PAIN_FUNCTIONAL_ASSESSMENT: NONE - DENIES PAIN

## 2022-08-19 ASSESSMENT — PAIN DESCRIPTION - ORIENTATION: ORIENTATION: RIGHT;LEFT

## 2022-08-19 NOTE — ED PROVIDER NOTES
[address]  Department of Emergency Medicine   ED  Encounter Note  Admit Date/RoomTime: 2022 12:42 PM  ED Room: [facility] [place]    NAME: Omi Hernandez  : 1990  MRN: [id number]     Chief Complaint:  Medication Problem (Missed Methadone due to baby being in hospital)    History of Present Illness      Omi Hernandez is a 36 y.o. old female presents to the emergency department via private vehicle to receive her methadone dose. Patient states that she missed her appointment at the clinic because her daughter is at [facility] children's admitted and the bus from [facility] to [facility] was late. Patient denies any symptoms whatsoever. Patient states she knows the clinic is open 6-9 am tomorrow morning and will get her weekend doses at that time. ROS   Pertinent positives and negatives are stated within HPI, all other systems reviewed and are negative. Past Medical History:  has a past medical history of Abnormal Pap smear of cervix, Anemia, Drug abuse ([address] Obesity, Overdose, and Preeclampsia complicating hypertension. Surgical History:  has no past surgical history on file. Social History:  reports that she has been smoking cigarettes. She has a 6.50 pack-year smoking history. She has never used smokeless tobacco. She reports that she does not currently use drugs after having used the following drugs: Methamphetamines (Crystal Meth). She reports that she does not drink alcohol. Family History: family history is not on file. Allergies: Patient has no known allergies. Physical Exam   Oxygen Saturation Interpretation: Normal.        ED Triage Vitals   BP Temp Temp src Heart Rate Resp SpO2 Height Weight   22 1239 22 1234 -- 22 1234 22 1239 22 1234 22 1239 22 1239   127/79 98 °F (36.7 °C)  72 16 99 % 5' 4\" (1.626 m) 267 lb (121.1 kg)       Constitutional:  Alert, development consistent with age.  Morbidly obese female. HEENT:  NC/NT. Airway patent. Neck:  Normal ROM. Supple. Respiratory:  Normal respiratory effort. CV: Regular rate and rhythm  Integument:  No rashes, erythema present. Lymphatics: No lymphangitis or adenopathy noted. Neurological:  Oriented. Motor functions intact. Lab / Imaging Results   (All laboratory and radiology results have been personally reviewed by myself)  Labs:  No results found for this visit on 08/19/22. Imaging: All Radiology results interpreted by Radiologist unless otherwise noted. No orders to display     ED Course / Medical Decision Making     Medications   methadone (DOLOPHINE) 10 MG/ML solution 100 mg (100 mg Oral Given 8/19/22 1325)        Consults:   None    Counseling/MDM:   Patient presents to the emergency department to receive her dose of methadone today. Patient states that she is on methadone 100 mg. States that she has been to the emergency department multiple times for the same issue. She denies any symptoms. Vital signs are normal.  Patient was educated understands that if she continues to miss her appointments with her clinic that she may rest getting kicked out of her program.  Patient also knows that methadone clinic is open 6-9 tomorrow morning and that she should go there to receive tomorrow's dose. Return to the emergency department for new or worsening symptoms. Assessment      1. Patient on methadone maintenance therapy [facility])      Plan   Discharged home. Patient condition is good    New Medications     Discharge Medication List as of 8/19/2022  1:32 PM        Electronically signed by Azar Blackwood PA-C   DD: 8/19/22  **This report was transcribed using voice recognition software. Every effort was made to ensure accuracy; however, inadvertent computerized transcription errors may be present.   END OF ED PROVIDER NOTE       Azar Blackwood PA-C  08/19/22 9520

## 2022-08-19 NOTE — Clinical Note
Silvana River was seen and treated in our emergency department on 8/19/2022. She may return to work on 08/20/2022. If you have any questions or concerns, please don't hesitate to call.       Rom Kim PA-C

## 2022-09-24 ENCOUNTER — HOSPITAL ENCOUNTER (EMERGENCY)
Age: 32
Discharge: HOME | End: 2022-09-24
Payer: MEDICAID

## 2022-09-24 VITALS
SYSTOLIC BLOOD PRESSURE: 125 MMHG | BODY MASS INDEX: 43.54 KG/M2 | HEART RATE: 81 BPM | DIASTOLIC BLOOD PRESSURE: 90 MMHG | RESPIRATION RATE: 19 BRPM | TEMPERATURE: 98.3 F | OXYGEN SATURATION: 98 % | HEIGHT: 64 IN | WEIGHT: 255 LBS

## 2022-09-24 DIAGNOSIS — F11.20 METHADONE MAINTENANCE THERAPY PATIENT (HCC): Primary | ICD-10-CM

## 2022-09-24 PROCEDURE — 99283 EMERGENCY DEPT VISIT LOW MDM: CPT

## 2022-09-24 PROCEDURE — 6370000000 HC RX 637 (ALT 250 FOR IP): Performed by: PHYSICIAN ASSISTANT

## 2022-09-24 RX ORDER — METHADONE HYDROCHLORIDE 10 MG/ML
100 CONCENTRATE ORAL ONCE
Status: COMPLETED | OUTPATIENT
Start: 2022-09-24 | End: 2022-09-24

## 2022-09-24 RX ADMIN — Medication 100 MG: at 14:36

## 2022-09-24 ASSESSMENT — PAIN SCALES - GENERAL
PAINLEVEL_OUTOF10: 0
PAINLEVEL_OUTOF10: 0

## 2022-09-24 ASSESSMENT — PAIN - FUNCTIONAL ASSESSMENT: PAIN_FUNCTIONAL_ASSESSMENT: NONE - DENIES PAIN

## 2022-09-24 NOTE — ED PROVIDER NOTES
[facility]  Department of Emergency Medicine   ED  Encounter Note  Admit Date/RoomTime: 2022  2:01 PM  ED Room: [place] [address]    NAME: Clifford Haro  : 1990  MRN: [id number]     Chief Complaint:  Other (Needs methadone 100mg today. Was unable to go to clinic today. Aaox4. )    History of Present Illness      Kecia Solis is a 36 y.o. old female presents to the emergency department via by private vehicle requesting medication(s) as result of missing her daily appointment at [facility] for methadone treatment. She is not currently enrolled in a pain management program. she has associated symptoms of none. Patient reports she is asymptomatic at this time denying chest pain, shortness of breath, fever, chills, nausea, vomiting or diarrhea. She reports that her last dose was yesterday afternoon. She states that the office closed at 9 AM today and she was unable to make it to the office prior to them closing today and therefore she is presenting to the ER for her daily dose. Patient has visited the ER many times in the past for similar complaint. She reports that her daughter saw [employer] and therefore she has trouble managing her time to make it to her methadone treatment daily as well as visiting her daughter up at [facility].  Patient reports that she takes 100 mg of methadone daily. ROS   Pertinent positives and negatives are stated within HPI, all other systems reviewed and are negative. Past Medical History:  has a past medical history of Abnormal Pap smear of cervix, Anemia, Drug abuse ([address] Obesity, Overdose, and Preeclampsia complicating hypertension. Surgical History:  has no past surgical history on file. Social History:  reports that she has been smoking cigarettes. She has a 6.50 pack-year smoking history.  She has never used smokeless tobacco. She reports that she does not currently use drugs after having used the following drugs: Methamphetamines (Crystal Meth). She reports that she does not drink alcohol. Family History: family history is not on file. Allergies: Patient has no known allergies. Physical Exam   Oxygen Saturation Interpretation: Normal.        ED Triage Vitals [09/24/22 1359]   BP Temp Temp src Heart Rate Resp SpO2 Height Weight   (!) 125/90 98.3 °F (36.8 °C) -- 81 19 98 % 5' 4\" (1.626 m) 255 lb (115.7 kg)         Constitutional:  Alert, development consistent with age. HEENT:  NC/NT. Airway patent. Neck:  Normal ROM. Supple. Respiratory:  Clear to auscultation and breath sounds equal.    CV: Regular rate and rhythm, normal heart sounds, without pathological murmurs, ectopy, gallops, or rubs. GI:  Abdomen Soft, nontender, good bowel sounds. No firm or pulsatile mass. Integument:  No rashes, erythema present. Lymphatics: No lymphangitis or adenopathy noted. Neurological:  Oriented. Motor functions intact. Lab / Imaging Results   (All laboratory and radiology results have been personally reviewed by myself)  Labs:  No results found for this visit on 09/24/22. Imaging: All Radiology results interpreted by Radiologist unless otherwise noted. No orders to display       ED Course / Medical Decision Making     Medications   methadone (DOLOPHINE) 10 MG/ML solution 100 mg (100 mg Oral Given 9/24/22 1436)        Consults:   None    Counseling/MDM:   SEYMOUR Tejada  have spoken with the patient and discussed todays emergency visit, in addition to providing specific details for the plan of care and counseling regarding the diagnosis and prognosis. She was counseled on the role of the emergency department regarding prescribing medications for chronic conditions including Narcotic and other controlled substances.  Based on the presenting complaint and nature of illness, the requested medications will be provided today as a one-time dose within the ER and no prescription form of this medication will be sent to the patient's pharmacy. Patient was educated on the importance of presenting to [place] daily for her methadone treatment as she has missed several appointments in the past and she is at high risk for being dismissed based on ED encounter history. Patient verbalized that she did understand that she needed to report to [facility] for daily treatment and should not utilize the emergency room for daily treatments. Patient did request one-time dose to be sent home with her as pedicles closed tomorrow. Patient was notified that she would not be given a prescription for her medication as it is not appropriate at this time. Questions are answered at this time and is agreeable with the plan. Assessment      1. Methadone maintenance therapy patient [facility])      Plan   Discharged home. Patient condition is good    New Medications     Discharge Medication List as of 9/24/2022  2:41 PM        Electronically signed by SEYMOUR Olson   DD: 9/24/22  **This report was transcribed using voice recognition software. Every effort was made to ensure accuracy; however, inadvertent computerized transcription errors may be present.   END OF ED PROVIDER NOTE       Davion Olson  09/24/22 8526

## 2022-09-25 ENCOUNTER — HOSPITAL ENCOUNTER (EMERGENCY)
Age: 32
Discharge: HOME | End: 2022-09-25
Payer: MEDICAID

## 2022-09-25 VITALS
HEART RATE: 80 BPM | SYSTOLIC BLOOD PRESSURE: 132 MMHG | TEMPERATURE: 99.1 F | OXYGEN SATURATION: 98 % | BODY MASS INDEX: 37.89 KG/M2 | RESPIRATION RATE: 18 BRPM | DIASTOLIC BLOOD PRESSURE: 80 MMHG | WEIGHT: 250 LBS | HEIGHT: 68 IN

## 2022-09-25 DIAGNOSIS — F11.20 METHADONE MAINTENANCE THERAPY PATIENT (HCC): Primary | ICD-10-CM

## 2022-09-25 PROCEDURE — 6370000000 HC RX 637 (ALT 250 FOR IP): Performed by: PHYSICIAN ASSISTANT

## 2022-09-25 PROCEDURE — 99283 EMERGENCY DEPT VISIT LOW MDM: CPT

## 2022-09-25 RX ORDER — METHADONE HYDROCHLORIDE 10 MG/ML
100 CONCENTRATE ORAL ONCE
Status: COMPLETED | OUTPATIENT
Start: 2022-09-25 | End: 2022-09-25

## 2022-09-25 RX ADMIN — Medication 100 MG: at 13:51

## 2022-09-25 ASSESSMENT — PAIN - FUNCTIONAL ASSESSMENT: PAIN_FUNCTIONAL_ASSESSMENT: NONE - DENIES PAIN

## 2022-09-25 NOTE — ED PROVIDER NOTES
[facility]  Department of Emergency Medicine   ED  Encounter Note  Admit Date/RoomTime: 2022 12:43 PM  ED Room: [place] [address]    NAME: Pia Calvert  : 1990  MRN: [id number]     Chief Complaint:  Other (Needs dose of methadone. Was here yesterday for same thing. Aaox4. )    History of Present Illness     Hay Yin is a 36 y.o. old female presents to the emergency department via by private vehicle requesting medication(s) as result of missing her daily appointment at [facility] for methadone treatment stating that the methadone clinic is closed on Sundays and that when she presented on Saturday (he which she missed her appointment yesterday) they give her dose on Saturday and dose to go home on for 7 days until they reopen on Monday. Patient did present to the ER yesterday for daily dosing and did request refill for 1 day (today), to which I did not prescribe for her as this is not appropriate. Patient reports that she is completely asymptomatic at this time. Patient [place] chest pain shortness of breath fever chills nausea vomiting or diarrhea. Patient states that she does not plan on presenting to the methadone clinic tomorrow for routine dosing. ROS   Pertinent positives and negatives are stated within HPI, all other systems reviewed and are negative. Past Medical History:  has a past medical history of Abnormal Pap smear of cervix, Anemia, Drug abuse ([address] Obesity, Overdose, and Preeclampsia complicating hypertension. Surgical History:  has no past surgical history on file. Social History:  reports that she has been smoking cigarettes. She has a 6.50 pack-year smoking history. She has never used smokeless tobacco. She reports that she does not currently use drugs after having used the following drugs: Methamphetamines (Crystal Meth). She reports that she does not drink alcohol.     Family History: family history is not on file.     Allergies: Patient has no known allergies. Physical Exam   Oxygen Saturation Interpretation: Normal.        ED Triage Vitals [09/25/22 1243]   BP Temp Temp src Heart Rate Resp SpO2 Height Weight   132/80 99.1 °F (37.3 °C) -- 80 18 98 % 5' 8\" (1.727 m) 250 lb (113.4 kg)         Constitutional:  Alert, development consistent with age. HEENT:  NC/NT. Airway patent. Neck:  Normal ROM. Supple. Respiratory:  Clear to auscultation and breath sounds equal.    CV: Regular rate and rhythm, normal heart sounds, without pathological murmurs, ectopy, gallops, or rubs. GI:  Abdomen Soft, nontender, good bowel sounds. No firm or pulsatile mass. Integument:  No rashes, erythema present. Lymphatics: No lymphangitis or adenopathy noted. Neurological:  Oriented. Motor functions intact. Lab / Imaging Results   (All laboratory and radiology results have been personally reviewed by myself)  Labs:  No results found for this visit on 09/25/22. Imaging: All Radiology results interpreted by Radiologist unless otherwise noted. No orders to display       ED Course / Medical Decision Making     Medications   methadone (DOLOPHINE) 10 MG/ML solution 100 mg (100 mg Oral Given 9/25/22 1351)        Consults:   None    Counseling/MDM:   SEYMOUR Simms  have spoken with the patient and discussed todays emergency visit, in addition to providing specific details for the plan of care and counseling regarding the diagnosis and prognosis. She was counseled on the role of the emergency department regarding prescribing medications for chronic conditions including Narcotic and other controlled substances. Based on the presenting complaint and nature of illness, the requested medications will not be provided today in prescription form, but a one-time dose of her daily methadone 100 mg oral solution will be given within the setting which she tolerated well.   Patient was recommended to present to [facility] in [address] tomorrow morning when they open for routine methadone dosing. Patient tolerated medication and no adverse reactions were noted. Patient was once again counseled, just that she was counseled yesterday, on the importance of presenting to her treatment center daily as directed as she is at high risk of being dismissed from the program.  Patient did verbalize that she will present to the methadone clinic tomorrow for her routine dosing. Patient appropriate for discharge, she is alert and oriented, no acute distress, nontachycardic, nonhypoxic. Patient states she is understandable and agreeable to plan. Assessment      1. Methadone maintenance therapy patient [facility])      Plan   Discharged home. Patient condition is good    New Medications     Discharge Medication List as of 9/25/2022  1:53 PM        Electronically signed by SEYMOUR Haddad   DD: 9/25/22  **This report was transcribed using voice recognition software. Every effort was made to ensure accuracy; however, inadvertent computerized transcription errors may be present.   END OF ED PROVIDER NOTE      Claudia Samuel, [address]  09/25/22 1348

## 2022-09-25 NOTE — DISCHARGE INSTRUCTIONS
Present to [facility] in [facility] tomorrow morning for routine methadone dosing.

## 2022-11-12 ENCOUNTER — HOSPITAL ENCOUNTER (EMERGENCY)
Age: 32
Discharge: HOME | End: 2022-11-12
Payer: MEDICAID

## 2022-11-12 VITALS
HEIGHT: 68 IN | HEART RATE: 60 BPM | WEIGHT: 250 LBS | RESPIRATION RATE: 16 BRPM | SYSTOLIC BLOOD PRESSURE: 165 MMHG | DIASTOLIC BLOOD PRESSURE: 104 MMHG | BODY MASS INDEX: 37.89 KG/M2 | OXYGEN SATURATION: 100 % | TEMPERATURE: 97.8 F

## 2022-11-12 DIAGNOSIS — F11.20 METHADONE MAINTENANCE THERAPY PATIENT (HCC): Primary | ICD-10-CM

## 2022-11-12 PROCEDURE — 6370000000 HC RX 637 (ALT 250 FOR IP): Performed by: NURSE PRACTITIONER

## 2022-11-12 PROCEDURE — 99283 EMERGENCY DEPT VISIT LOW MDM: CPT

## 2022-11-12 RX ORDER — METHADONE HYDROCHLORIDE 10 MG/ML
100 CONCENTRATE ORAL ONCE
Status: COMPLETED | OUTPATIENT
Start: 2022-11-12 | End: 2022-11-12

## 2022-11-12 RX ADMIN — Medication 100 MG: at 14:01

## 2022-11-12 ASSESSMENT — PAIN - FUNCTIONAL ASSESSMENT: PAIN_FUNCTIONAL_ASSESSMENT: NONE - DENIES PAIN

## 2022-11-12 NOTE — ED PROVIDER NOTES
Independent [facility]  HPI:  11/12/22,   Time: 1:18 PM BHAVANA Burgos is a 36 y.o. female presenting to the ED for methadone dose, beginning this a.m. ago. The complaint has been persistent, mild in severity, and worsened by nothing. Patient presents stating that she slept in and missed her appointment for the methadone clinic this morning. She has been clean from substance abuse for quite some time but needs a dose of methadone. She takes 100 mg daily. ROS:   Pertinent positives and negatives are stated within HPI, all other systems reviewed and are negative.  --------------------------------------------- PAST HISTORY ---------------------------------------------  Past Medical History:  has a past medical history of Abnormal Pap smear of cervix, Anemia, Drug abuse ([facility] [address] Obesity, Overdose, and Preeclampsia complicating hypertension. Past Surgical History:  has no past surgical history on file. Social History:  reports that she has been smoking cigarettes. She has a 6.50 pack-year smoking history. She has never used smokeless tobacco. She reports that she does not currently use drugs after having used the following drugs: Methamphetamines (Crystal Meth). She reports that she does not drink alcohol. Family History: family history is not on file. The patients home medications have been reviewed. Allergies: Patient has no known allergies. -------------------------------------------------- RESULTS -------------------------------------------------  All laboratory and radiology results have been personally reviewed by myself   LABS:  No results found for this visit on 11/12/22. RADIOLOGY:  Interpreted by Radiologist.  No orders to display       ------------------------- NURSING NOTES AND VITALS REVIEWED ---------------------------   The nursing notes within the ED encounter and vital signs as below have been reviewed.    BP (!) 165/104   Pulse 60   Temp 97.8 °F (36.6 °C) (Temporal)   Resp 16   Ht 5' 8\" (1.727 m)   Wt 250 lb (113.4 kg)   SpO2 100%   BMI 38.01 kg/m²   Oxygen Saturation Interpretation: Normal      ---------------------------------------------------PHYSICAL EXAM--------------------------------------      Constitutional/General: Alert and oriented x3, well appearing, non toxic in NAD  Head: NC/AT  Eyes: PERRL, EOMI  Mouth: Oropharynx clear, handling secretions, no trismus  Neck: Supple, full ROM, no meningeal signs  Pulmonary: Lungs clear to auscultation bilaterally, no wheezes, rales, or rhonchi. Not in respiratory distress  Cardiovascular:  Regular rate and rhythm, no murmurs, gallops, or rubs. 2+ distal pulses  Abdomen: Soft, non tender, non distended,   Extremities: Moves all extremities x 4. Warm and well perfused  Skin: warm and dry without rash  Neurologic: GCS 15,  Psych: Normal Affect      ------------------------------ ED COURSE/MEDICAL DECISION MAKING----------------------  Medications   methadone (DOLOPHINE) 10 MG/ML solution 100 mg (has no administration in time range)         Medical Decision Makin-she does follow with [facility] methadone clinic. She was advised and reminded to set her alarm for her daily dose of methadone however 1 dose was given to her today and advised to follow-up with PCP. Counseling: The emergency provider has spoken with the patient and discussed todays results, in addition to providing specific details for the plan of care and counseling regarding the diagnosis and prognosis. Questions are answered at this time and they are agreeable with the plan.      --------------------------------- IMPRESSION AND DISPOSITION ---------------------------------    IMPRESSION  1.  Methadone maintenance therapy patient [facility])        DISPOSITION  Disposition: Discharge to home  Patient condition is good                  JIM Lewis - CNP  22 1533

## 2022-11-14 ENCOUNTER — HOSPITAL ENCOUNTER (EMERGENCY)
Age: 32
Discharge: HOME | End: 2022-11-14
Payer: MEDICAID

## 2022-11-14 VITALS
RESPIRATION RATE: 16 BRPM | SYSTOLIC BLOOD PRESSURE: 145 MMHG | TEMPERATURE: 97.3 F | OXYGEN SATURATION: 100 % | DIASTOLIC BLOOD PRESSURE: 101 MMHG | HEART RATE: 76 BPM

## 2022-11-14 DIAGNOSIS — Z76.89 ENCOUNTER FOR MEDICATION ADMINISTRATION: Primary | ICD-10-CM

## 2022-11-14 PROCEDURE — 99283 EMERGENCY DEPT VISIT LOW MDM: CPT

## 2022-11-14 PROCEDURE — 6370000000 HC RX 637 (ALT 250 FOR IP): Performed by: NURSE PRACTITIONER

## 2022-11-14 RX ORDER — METHADONE HYDROCHLORIDE 10 MG/ML
100 CONCENTRATE ORAL ONCE
Status: COMPLETED | OUTPATIENT
Start: 2022-11-14 | End: 2022-11-14

## 2022-11-14 RX ADMIN — Medication 100 MG: at 13:39

## 2022-11-14 ASSESSMENT — PAIN - FUNCTIONAL ASSESSMENT: PAIN_FUNCTIONAL_ASSESSMENT: NONE - DENIES PAIN
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
## 2022-12-26 ENCOUNTER — HOSPITAL ENCOUNTER (EMERGENCY)
Age: 32
Discharge: HOME | End: 2022-12-26
Attending: EMERGENCY MEDICINE
Payer: MEDICAID

## 2022-12-26 ENCOUNTER — APPOINTMENT (OUTPATIENT)
Dept: GENERAL RADIOLOGY | Age: 32
End: 2022-12-26
Payer: MEDICAID

## 2022-12-26 VITALS
DIASTOLIC BLOOD PRESSURE: 98 MMHG | HEART RATE: 92 BPM | RESPIRATION RATE: 17 BRPM | WEIGHT: 209 LBS | BODY MASS INDEX: 35.68 KG/M2 | SYSTOLIC BLOOD PRESSURE: 141 MMHG | OXYGEN SATURATION: 100 % | HEIGHT: 64 IN | TEMPERATURE: 97.9 F

## 2022-12-26 DIAGNOSIS — S82.891A CLOSED FRACTURE OF RIGHT ANKLE, INITIAL ENCOUNTER: ICD-10-CM

## 2022-12-26 DIAGNOSIS — M25.571 ACUTE RIGHT ANKLE PAIN: Primary | ICD-10-CM

## 2022-12-26 PROCEDURE — 73610 X-RAY EXAM OF ANKLE: CPT

## 2022-12-26 PROCEDURE — 96374 THER/PROPH/DIAG INJ IV PUSH: CPT

## 2022-12-26 PROCEDURE — 73564 X-RAY EXAM KNEE 4 OR MORE: CPT

## 2022-12-26 PROCEDURE — 99285 EMERGENCY DEPT VISIT HI MDM: CPT

## 2022-12-26 PROCEDURE — 6360000002 HC RX W HCPCS

## 2022-12-26 PROCEDURE — 96372 THER/PROPH/DIAG INJ SC/IM: CPT

## 2022-12-26 PROCEDURE — 6360000002 HC RX W HCPCS: Performed by: EMERGENCY MEDICINE

## 2022-12-26 RX ORDER — MORPHINE SULFATE 4 MG/ML
4 INJECTION, SOLUTION INTRAMUSCULAR; INTRAVENOUS ONCE
Status: COMPLETED | OUTPATIENT
Start: 2022-12-26 | End: 2022-12-26

## 2022-12-26 RX ORDER — PROPOFOL 10 MG/ML
INJECTION, EMULSION INTRAVENOUS
Status: COMPLETED
Start: 2022-12-26 | End: 2022-12-26

## 2022-12-26 RX ORDER — OXYCODONE HYDROCHLORIDE AND ACETAMINOPHEN 5; 325 MG/1; MG/1
1 TABLET ORAL EVERY 6 HOURS PRN
Qty: 10 TABLET | Refills: 0 | Status: SHIPPED | OUTPATIENT
Start: 2022-12-26 | End: 2022-12-29

## 2022-12-26 RX ORDER — FENTANYL CITRATE 50 UG/ML
50 INJECTION, SOLUTION INTRAMUSCULAR; INTRAVENOUS ONCE
Status: COMPLETED | OUTPATIENT
Start: 2022-12-26 | End: 2022-12-26

## 2022-12-26 RX ORDER — MORPHINE SULFATE 4 MG/ML
4 INJECTION, SOLUTION INTRAMUSCULAR; INTRAVENOUS ONCE
Status: DISCONTINUED | OUTPATIENT
Start: 2022-12-26 | End: 2022-12-26

## 2022-12-26 RX ADMIN — PROPOFOL 200 MG: 10 INJECTION, EMULSION INTRAVENOUS at 11:35

## 2022-12-26 RX ADMIN — MORPHINE SULFATE 4 MG: 4 INJECTION, SOLUTION INTRAMUSCULAR; INTRAVENOUS at 10:14

## 2022-12-26 RX ADMIN — FENTANYL CITRATE 50 MCG: 50 INJECTION, SOLUTION INTRAMUSCULAR; INTRAVENOUS at 13:30

## 2022-12-26 ASSESSMENT — ENCOUNTER SYMPTOMS
ABDOMINAL PAIN: 0
RHINORRHEA: 0
WHEEZING: 0
COUGH: 0
SHORTNESS OF BREATH: 0
VOMITING: 0
DIARRHEA: 0
NAUSEA: 0
PHOTOPHOBIA: 0
TROUBLE SWALLOWING: 0
BACK PAIN: 0
VOICE CHANGE: 0

## 2022-12-26 ASSESSMENT — PAIN SCALES - GENERAL
PAINLEVEL_OUTOF10: 9
PAINLEVEL_OUTOF10: 10
PAINLEVEL_OUTOF10: 10

## 2022-12-26 ASSESSMENT — PAIN - FUNCTIONAL ASSESSMENT: PAIN_FUNCTIONAL_ASSESSMENT: 0-10

## 2022-12-26 ASSESSMENT — PAIN DESCRIPTION - ORIENTATION: ORIENTATION: RIGHT

## 2022-12-26 ASSESSMENT — PAIN DESCRIPTION - LOCATION: LOCATION: ANKLE

## 2022-12-26 ASSESSMENT — LIFESTYLE VARIABLES
HOW MANY STANDARD DRINKS CONTAINING ALCOHOL DO YOU HAVE ON A TYPICAL DAY: PATIENT DOES NOT DRINK
HOW OFTEN DO YOU HAVE A DRINK CONTAINING ALCOHOL: NEVER

## 2022-12-26 NOTE — ED NOTES
Pt resting in position of comfort on cot presenting in no acute/ apparent distress (NAD). Respirations are noted even and unlabored with good rise and fall of the chest observed. Pt updated with current plan of care (POC) and all questions/ concerns addressed. Patient voices no needs at this time. Cot noted in lowest position, locked, with side rails X 2 up for patient safety. Will continue to monitor patient for acute changes.       [x] Side rails up    [x] Cart in lowest position    [] Family at bedside    [x] Call light within reach           [facility] [place], RN  12/26/22 1560

## 2022-12-26 NOTE — ED PROVIDER NOTES
HPI:  12/26/22, Time: 12:04 PM BHAVANA Vora is a 29 y.o. female presenting to the ED for Fall twisting RT ankle at home , beginning several hours ago. The complaint has been persistent, severe in severity, and worsened by standing. Patient stated that she got up to use the restroom and almost stepped on her daughter she tripped and fell injuring her right ankle. Tender swollen unable to bear weight. No previous injuries reported. States the pain is sharp rating up into her knee. Not hit her head. She has no chest pain shortness of breath or other neurological complaints. Review of Systems:   A complete review of systems was performed and pertinent positives and negatives are stated within HPI, all other systems reviewed and are negative.    --------------------------------------------- PAST HISTORY ---------------------------------------------  Past Medical History:  has a past medical history of Abnormal Pap smear of cervix, Anemia, Drug abuse ([facility] [address] Obesity, Overdose, and Preeclampsia complicating hypertension. Past Surgical History:  has no past surgical history on file. Social History:  reports that she has been smoking cigarettes. She has a 6.50 pack-year smoking history. She has never used smokeless tobacco. She reports that she does not currently use drugs after having used the following drugs: Methamphetamines (Crystal Meth). She reports that she does not drink alcohol. Family History: family history is not on file. The patients home medications have been reviewed. Allergies: Patient has no known allergies. -------------------------------------------------- RESULTS -------------------------------------------------  All laboratory and radiology results have been personally reviewed by myself   LABS:  No results found for this visit on 12/26/22.     RADIOLOGY:  Interpreted by Radiologist.  XR ANKLE RIGHT (MIN 3 VIEWS)   Final Result   Status post splinting of the right ankle. There is near anatomic alignment   of the by malleolar fractures. XR KNEE RIGHT (MIN 4 VIEWS)   Final Result   No acute abnormality of the knee. XR ANKLE RIGHT (MIN 3 VIEWS)   Final Result   1. Minimally displaced oblique fracture through the distal fibula   2. Minimally displaced fracture of the medial malleolus   3. Mild widening of the medial clear space suggestive of mild interruption of   the ankle mortise   4. Significant lateral soft tissue swelling.             ------------------------- NURSING NOTES AND VITALS REVIEWED ---------------------------   The nursing notes within the ED encounter and vital signs as below have been reviewed. BP (!) 141/98   Pulse 92   Temp 97.9 °F (36.6 °C)   Resp 17   Ht 5' 4\" (1.626 m)   Wt 209 lb (94.8 kg)   SpO2 100%   BMI 35.87 kg/m²   Oxygen Saturation Interpretation: Normal      ---------------------------------------------------PHYSICAL EXAM--------------------------------------      Constitutional/General: Alert and oriented x3, well appearing, non toxic in NAD  Head: Normocephalic and atraumatic  Eyes: PERRL, EOMI  Mouth: Oropharynx clear, handling secretions, no trismus  Neck: Supple, full ROM,   Pulmonary: Lungs clear to auscultation bilaterally, no wheezes, rales, or rhonchi. Not in respiratory distress  Cardiovascular:  Regular rate and rhythm, no murmurs, gallops, or rubs. 2+ distal pulses  Abdomen: Soft, non tender, non distended,   Extremities: Moves all extremities x 4. Warm and well perfused, right ankle was grossly swollen is mildly deformed. She does have palpable pulses in the right foot. Skin: warm and dry without rash  Neurologic: GCS 15, focal deficits of the right foot. Fine touch is intact.   Psych: Normal Affect    ------------------------------ ED COURSE/MEDICAL DECISION MAKING----------------------  Medications   morphine sulfate (PF) injection 4 mg (4 mg IntraMUSCular Given 12/26/22 1014) propofol 200 MG/20ML injection (200 mg  Given 12/26/22 3815)         ED COURSE:       Medical Decision Making:    Patient sent for x-rays of the right ankle. There is disruption of the lateral malleolus. There is fracture of the medial malleolus with disruption of the ankle mortise. Posterior malleoli is intact. Patient was splinted initially and consultation was made with orthopedic surgery to evaluate her. After evaluation and sabi-ray of the patient orthopedics wanted to sedate her and tried to subtle reduction of the ankle mortise disruption. Patient was agreed to sedation. Please see separate notes. -------------------------------- Conscious Sedation Procedure Note -----------------------------  Patient Name: Dima Aldridge   Medical Record Number: [id number]  Date: 12/26/22   Time: 12:13 PM EST   Room/Bed: 31/31    Indication: ankle dislocation and fracture dislocation  Consent: I have discussed with the patient and/or the patient representative the indication, alternatives, and the possible risks and/or complications of the planned procedure and the anesthesia methods. The patient and/or patient representative appear to understand and agree to proceed. Physician Involvement: The attending physician was present and supervising this procedure. 12/26/22     Time: 1130 am (pre-procedure start time)  Pre-Sedation Documentation and Exam: I have personally completed a history, physical exam & review of systems for this patient (see notes). Vital signs have been reviewed (see flow sheet for vitals). I have reviewed the patient's history and review of systems.   Airway Assessment: normal neck range of motion, Mallampati Class I - (soft palate, fauces, uvula & anterior/posterior tonsillar pillars are visible)  Prior History of Anesthesia Complications: none  ASA Classification: Class 1 - A normal healthy patient  Sedation/ Anesthesia Plan: intravenous sedation  Medications Used: propofol intravenously  Monitoring and Safety: The patient was placed on a cardiac monitor and vital signs, pulse oximetry and level of consciousness were continuously evaluated throughout the procedure. The patient was closely monitored until recovery from the medications was complete and the patient had returned to baseline status. Respiratory therapy was on standby at all times during the procedure.    ----------------------------------- Post Conscious Sedation Note -----------------------------------    12/26/22     Time: 1216 (post-procedure stop time)  Post-Sedation Vital Signs: Vital signs were reviewed and were stable after the procedure (see flow sheet for vitals)       Post-Sedation Exam: Lungs: clear to auscultation bilaterally       Complications: none    Electronically Signed by: Ariana Carlos DO        PROCEDURE NOTE    12/26/22       Time: 1145 am    JOINT  REDUCTION  PROCEDURE  Risks, benefits and alternatives (for applicable procedures below) described. Performed By: Ariana Carlos DO and Orthopaedic Resident. Indication: Joint dislocation and fracture  Informed consent: Written consent obtained. The patient was counseled regarding the procedure in person, it's indications, risks, potential complications and alternatives and any questions were answered. Consent was obtained. .  Location:   right  ankle  Procedure:  Anesthsetic/anesthsia was obtained using conscious sedation -SEE CONSCIOUS SEDATION NOTE FOR DETAILS. Attempted reduction was performed by traction and counter traction and was successful. Patient tolerated the procedure well. Post-reduction XR's:  were obtained and revealed satisfactory reduction. Orthopaedic Consultation:  Yes. PROCEDURE NOTE  12/26/22       Time: 6118    SPLINT  APPLICATION  Risks, benefits and alternatives (for applicable procedures below) described. Performed By: Ariana Carlos DO and Orthopaedic Resident.     Indication:  fracture, dislocation of RT ankle Prudence Mares Procedure:   A short  right leg  Posterior, Lateral, and Medial splint was applied by me and ortho resident. The patient tolerated the procedure well. Counseling: The emergency provider has spoken with the patient and discussed todays results, in addition to providing specific details for the plan of care and counseling regarding the diagnosis and prognosis. Questions are answered at this time and they are agreeable with the plan.      --------------------------------- IMPRESSION AND DISPOSITION ---------------------------------    IMPRESSION  1. Acute right ankle pain    2. Closed fracture of right ankle, initial encounter        DISPOSITION  Disposition: Discharge to home  Patient condition is serious      NOTE: This report was transcribed using voice recognition software.  Every effort was made to ensure accuracy; however, inadvertent computerized transcription errors may be present       Carles Bloch, DO  12/26/22 1221

## 2022-12-26 NOTE — SEDATION DOCUMENTATION
Leg splinted at this time.  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 Another 100 mg propofol given at this time

## 2022-12-26 NOTE — ED PROVIDER NOTES
42-year-old female presents to the emergency department for complaints of right ankle pain. She stated around 5 in the morning she was trying to avoid stepping on her daughter who is sleeping on the floor. She inwardly twisted her foot and fell. She did not hit her head. She is not on blood thinners. Right Ankle pain shooting to the knee. She describes the pain as sharp and constant. She is unable to bear weight. She arrived to the emergency department via EMS with an ice pack. No other palliative/aggravating/alleviating measures noted. Chief Complaint   Patient presents with    Ankle Pain     Patient states that she twisted and injured her right ankle last night, tenderness to palpation and movement. Review of Systems   Constitutional:  Negative for chills, fatigue and fever. HENT:  Negative for congestion, rhinorrhea, trouble swallowing and voice change. Eyes:  Negative for photophobia and visual disturbance. Respiratory:  Negative for cough, shortness of breath and wheezing. Cardiovascular:  Negative for palpitations and leg swelling. Gastrointestinal:  Negative for abdominal pain, diarrhea, nausea and vomiting. Genitourinary:  Negative for dysuria, frequency and hematuria. Musculoskeletal:  Negative for back pain, neck pain and neck stiffness. Skin:  Negative for wound. Neurological:  Negative for dizziness, syncope, facial asymmetry, weakness and numbness. Psychiatric/Behavioral:  Negative for behavioral problems and confusion. Physical Exam  Constitutional:       Appearance: Normal appearance. HENT:      Head: Normocephalic and atraumatic. Nose: Nose normal.   Eyes:      Extraocular Movements: Extraocular movements intact. Conjunctiva/sclera: Conjunctivae normal.      Pupils: Pupils are equal, round, and reactive to light. Cardiovascular:      Rate and Rhythm: Normal rate and regular rhythm.    Pulmonary:      Effort: Pulmonary effort is normal. Breath sounds: Normal breath sounds. Abdominal:      Palpations: Abdomen is soft. Tenderness: There is no abdominal tenderness. Musculoskeletal:      Cervical back: Normal range of motion and neck supple. Right knee: Bony tenderness present. No swelling or deformity. Decreased range of motion. Left knee: Normal.      Right lower leg: Tenderness and bony tenderness present. No deformity or lacerations. Left lower leg: No tenderness. Right ankle: Swelling present. No deformity, ecchymosis or lacerations. Tenderness present. Decreased range of motion. Right Achilles Tendon: No tenderness. Left ankle: Normal.      Left Achilles Tendon: Normal.   Skin:     General: Skin is warm. Capillary Refill: Capillary refill takes less than 2 seconds. Neurological:      Mental Status: She is alert and oriented to person, place, and time. Mental status is at baseline. Procedures       MDM     26-year-old female presents to the emergency department for complaints of right ankle pain. She stated around 5 in the morning she was trying to avoid stepping on her daughter who is sleeping on the floor. She inwardly twisted her foot and fell. .  She arrived to the emergency department via EMS with an ice pack. She is unable to bear weight upon entering room patient is hemodynamically stable. She is afebrile. She is in pain and complaining about her legs. There is tenderness and tibia as well as the fibula. Imaging is notable for malleolus fracture and oblique distal fibula fracture. Patient was given fentanyl and morphine for pain control and propofol for sedation towards splinting her leg procedure. Post x-ray showed good alignment no splint. Patient was told to follow-up with orthopedics outpatient she agreed to plan. She is to be discharged with oxycodone tablets.   For pain control.                     --------------------------------------------- PAST HISTORY ---------------------------------------------  Past Medical History:  has a past medical history of Abnormal Pap smear of cervix, Anemia, Drug abuse ([address] Obesity, Overdose, and Preeclampsia complicating hypertension. Past Surgical History:  has no past surgical history on file. Social History:  reports that she has been smoking cigarettes. She has a 6.50 pack-year smoking history. She has never used smokeless tobacco. She reports that she does not currently use drugs after having used the following drugs: Methamphetamines (Crystal Meth). She reports that she does not drink alcohol. Family History: family history is not on file. The patients home medications have been reviewed. Allergies: Patient has no known allergies. -------------------------------------------------- RESULTS -------------------------------------------------  Labs:  No results found for this visit on 12/26/22. Radiology:  XR ANKLE RIGHT (MIN 3 VIEWS)   Final Result   Status post splinting of the right ankle. There is near anatomic alignment   of the by malleolar fractures. XR KNEE RIGHT (MIN 4 VIEWS)   Final Result   No acute abnormality of the knee. XR ANKLE RIGHT (MIN 3 VIEWS)   Final Result   1. Minimally displaced oblique fracture through the distal fibula   2. Minimally displaced fracture of the medial malleolus   3. Mild widening of the medial clear space suggestive of mild interruption of   the ankle mortise   4. Significant lateral soft tissue swelling.             ------------------------- NURSING NOTES AND VITALS REVIEWED ---------------------------  Date / Time Roomed:  12/26/2022  8:09 AM  ED Bed Assignment:  12/44    The nursing notes within the ED encounter and vital signs as below have been reviewed.    BP (!) 141/98   Pulse 92   Temp 97.9 °F (36.6 °C)   Resp 17   Ht 5' 4\" (1.626 m)   Wt 209 lb (94.8 kg)   SpO2 100%   BMI 35.87 kg/m²   Oxygen Saturation Interpretation: Normal      ------------------------------------------ PROGRESS NOTES ------------------------------------------  I have spoken with the patient and discussed todays results, in addition to providing specific details for the plan of care and counseling regarding the diagnosis and prognosis. Their questions are answered at this time and they are agreeable with the plan. I discussed at length with them reasons for immediate return here for re evaluation. They will followup with primary care by calling their office tomorrow. --------------------------------- ADDITIONAL PROVIDER NOTES ---------------------------------  At this time the patient is without objective evidence of an acute process requiring hospitalization or inpatient management. They have remained hemodynamically stable throughout their entire ED visit and are stable for discharge with outpatient follow-up. The plan has been discussed in detail and they are aware of the specific conditions for emergent return, as well as the importance of follow-up. New Prescriptions    OXYCODONE-ACETAMINOPHEN (PERCOCET) 5-325 MG PER TABLET    Take 1 tablet by mouth every 6 hours as needed for Pain for up to 3 days. Max Daily Amount: 4 tablets       Diagnosis:  1. Acute right ankle pain    2. Closed fracture of right ankle, initial encounter        Disposition:  Patient's disposition: Discharge to home  Patient's condition is stable. Attending was present and available throughout encounter including all critical portions;  See Attending Note/Attestation for Final [address], DO  Resident  12/26/22 6094

## 2022-12-26 NOTE — CONSULTS
Department of Orthopedic Surgery  Resident Consult Note          Reason for Consult: Right ankle pain    HISTORY OF PRESENT ILLNESS:       Patient is a 29 y.o. female who presents with right ankle pain after a fall from standing. Patient states she was getting up off her couch and did not realize that her baby was sitting right in front of her, she took an awkward step to avoid stepping on her baby and twisted her right ankle. Was able to ambulate afterwards, having significant pain. She was initially evaluated by the ED who put her in a stirrup splint. Denies numbness/tingling/paresthesias. Denies any other orthopedic complaints at this time. Patient is on methadone, she is still having significant pain in the ankle. Her splint was taken down and a close reduction was reattempted by orthopedics. Past Medical History:        Diagnosis Date    Abnormal Pap smear of cervix     Anemia     Drug abuse ([facility] [address]     Obesity     Overdose     multiple    Preeclampsia complicating hypertension 4/23/2021     Past Surgical History:    No past surgical history on file. Current Medications:   No current facility-administered medications for this encounter. Allergies:  Patient has no known allergies. Social History:   TOBACCO:   reports that she has been smoking cigarettes. She has a 6.50 pack-year smoking history. She has never used smokeless tobacco.  ETOH:   reports no history of alcohol use. DRUGS:   reports that she does not currently use drugs after having used the following drugs: Methamphetamines (Crystal Meth). ACTIVITIES OF DAILY LIVING:    OCCUPATION:    Family History:   No family history on file.     REVIEW OF SYSTEMS:  CONSTITUTIONAL:  negative for  fevers, chills  EYES:  negative for blurred vision, visual disturbance  HEENT:  negative for  hearing loss, voice change  RESPIRATORY:  negative for  dyspnea, wheezing  CARDIOVASCULAR:  negative for  chest pain, palpitations  GASTROINTESTINAL:  negative for nausea, vomiting  GENITOURINARY:  negative for frequency, urinary incontinence  HEMATOLOGIC/LYMPHATIC:  negative for bleeding and petechiae  MUSCULOSKELETAL:  positive for right ankle pain  NEUROLOGICAL:  negative for headaches, dizziness  BEHAVIOR/PSYCH:  negative for increased agitation and anxiety    PHYSICAL EXAM:    VITALS:  BP (!) 141/98   Pulse 92   Temp 97.9 °F (36.6 °C)   Resp 17   Ht 5' 4\" (1.626 m)   Wt 209 lb (94.8 kg)   SpO2 100%   BMI 35.87 kg/m²   CONSTITUTIONAL:  awake, alert, cooperative, moderate distress, and appears stated age  MUSCULOSKELETAL:  Right lower Extremity:  Stirrup splint taken down. Her skin is intact circumferentially about the leg and ankle  Apartments of the leg are soft and compressible  Has significant TTP both medial and lateral malleoli  Patient able to plantarflex and dorsiflex all toes, range of motion significantly limited secondary to pain  Sensation light touch grossly intact in the peroneal, sural, saphenous, tibial nerves  Toes are warm and perfused, brisk capillary refill    Secondary Exam:   bilateralUE: No obvious signs of trauma. -TTP to fingers, hand, wrist, forearm, elbow, humerus, shoulder or clavicle. -- Patient able to flex/extend fingers, wrist, elbow and shoulder with active and passive ROM without pain, +2/4 Radial pulse, cap refill <3sec, +AIN/PIN/Radial/Ulnar/Median N, distal sensation grossly intact to C4-T1 dermatomes, compartments soft and compressible. leftLE: No obvious signs of trauma. -TTP to foot, ankle, leg, knee, thigh, hip.-- Patient able to flex/extend toes, ankle, knee and hip with active and passive ROM without pain,+2/4 DP & PT pulses, cap refill <3sec, +5/5 PF/DF/EHL, distal sensation grossly intact to L4-S1 dermatomes, compartments soft and compressible.     Pelvis: -TTP, -Log roll, -Heel strike     DATA:    CBC:   Lab Results   Component Value Date/Time    WBC 10.8 06/12/2022 05:10 AM    RBC 4.47 06/12/2022 05:10 AM    HGB 14.1 06/12/2022 05:10 AM    HCT 41.3 06/12/2022 05:10 AM    MCV 92.4 06/12/2022 05:10 AM    MCH 31.5 06/12/2022 05:10 AM    MCHC 34.1 06/12/2022 05:10 AM    RDW 12.6 06/12/2022 05:10 AM     06/12/2022 05:10 AM    MPV 10.3 06/12/2022 05:10 AM     PT/INR:    Lab Results   Component Value Date/Time    PROTIME 12.9 11/23/2019 03:35 AM    INR 1.1 11/23/2019 03:35 AM       Radiology Review:  3 views of the right ankle reviewed. There is displaced bimalleolar ankle fracture. Cowart B lateral malleolus fracture with shortening of the fibula. Medial clear space widening with transverse medial malleolus fracture. There is lateral talar shift. Post reduction x-rays reviewed. Improvement in patient's overall ankle alignment, confirmation of bimalleolar ankle fracture. Do not appreciate any posterior malleolus involvement. overlying splint material.    IMPRESSION:  Closed displaced right bimalleolar ankle fracture    PLAN:  Patient went underwent conscious sedation and a closed reduction with application of a well-padded 3 sided short leg splint. She tolerated fairly well.   Patient states she takes methadone, will avoid opioids for pain control  Follow-up with Dr. Chata Colorado in 1 week in the office  Nonweightbearing to the right lower extremity  Nothing acute planned by orthopedic surgery at this time  Discuss with attending    Toni Jonas DO , PGY-2  12/26/22    Electronically signed by Toni Jonas DO on 12/26/2022 at 12:24 PM

## 2022-12-27 NOTE — ED NOTES
Patient noted stable and presenting in no acute distress. Discharge instructions and medication list reviewed (as required) with the patient. All questions and concerns were addressed at this time, and the patient expresses understanding. Patient released with vitals noted as recorded.         Justina Donahue RN  12/26/22 1038

## 2023-01-04 ENCOUNTER — OFFICE VISIT (OUTPATIENT)
Dept: ORTHOPEDIC SURGERY | Age: 33
End: 2023-01-04

## 2023-01-04 VITALS — HEIGHT: 64 IN | BODY MASS INDEX: 35.68 KG/M2 | WEIGHT: 209 LBS

## 2023-01-04 DIAGNOSIS — S82.891A CLOSED FRACTURE OF RIGHT ANKLE, INITIAL ENCOUNTER: Primary | ICD-10-CM

## 2023-01-04 RX ORDER — OXYCODONE HYDROCHLORIDE AND ACETAMINOPHEN 5; 325 MG/1; MG/1
1 TABLET ORAL EVERY 6 HOURS PRN
Qty: 12 TABLET | Refills: 0 | Status: ON HOLD
Start: 2023-01-04 | End: 2023-01-06 | Stop reason: HOSPADM

## 2023-01-04 NOTE — PROGRESS NOTES
Surgical Procedure: RIGHT ANKLE OPEN REDUCTION INTERNAL FIXATION VS EXTERNAL FIXATION  ICD Code: L83.394D  CPT Code: 61480  Needs: Synthes   Anesthesia: General w/ PNB   Date: Friday, January 6, 2023   Time: 8:30 am   Place: [facility]  Surgeon: Severo Ip    Medications reviewed with the patient / holding no medications  Pre Op Instructions reviewed with the patient. Informed patient: A hospital nurse will contact her with instructions for the day of surgery. The patient expresses understanding and is in agreement with the plan. Patient requests PAT call [phone] w/ instructions    Post Op Appointment:  Wednesday, January 18 th at 1:10 pm    1/04/2023 10:58 am Esme dillon informed of date / time / place and procedure

## 2023-01-04 NOTE — PROGRESS NOTES
New Ankle Patient     Referring Provider: No referring provider defined for this encounter. CHIEF COMPLAINT:   Chief Complaint   Patient presents with    Ankle Injury     ER FU Right ankle fracture DOI 12/25/2022. Tried to jump over daughter getting off couch. HPI:    Cristhian Perez is a 29y.o. year old female who presents today for evaluation of right ankle fracture. She states on 12/25/2022 she was jumping to avoid colliding with her child when she landed awkwardly on her ankle. She experienced immediate pain and swelling and was unable to ambulate on the right lower extremity as a result. The following day she presented to the emergency department where radiographs revealed right ankle bimalleolar ankle fracture. She was seen by orthopedic resident where she underwent closed reduction and splinting. She was instructed to follow-up with our office today. She continues to have some swelling and pain in the right ankle. She denies numbness or tingling. No other complaints today. Of note, patient is on methadone and is seen in the pain clinic. She has been compliant with nonweightbearing in her splint since her injury. PAST MEDICAL HISTORY  Past Medical History:   Diagnosis Date    Abnormal Pap smear of cervix     Anemia     Drug abuse ([facility] [address]     Obesity     Overdose     multiple    Preeclampsia complicating hypertension 4/23/2021       PAST SURGICAL HISTORY  History reviewed. No pertinent surgical history. FAMILY HISTORY   History reviewed. No pertinent family history.     SOCIAL HISTORY  Social History     Socioeconomic History    Marital status: Single     Spouse name: Not on file    Number of children: 4    Years of education: Not on file    Highest education level: Not on file   Occupational History    Not on file   Tobacco Use    Smoking status: Every Day     Packs/day: 0.50     Years: 13.00     Pack years: 6.50     Types: Cigarettes    Smokeless tobacco: Never   Vaping Use Vaping Use: Never used   Substance and Sexual Activity    Alcohol use: No    Drug use: Not Currently     Types: Methamphetamines (Crystal Meth)     Comment: Pedro Pablo Garcia 1/26/21    Sexual activity: Yes     Partners: Male   Other Topics Concern    Not on file   Social History Narrative    Has 4 children and a fiance     Social Determinants of Health     Financial Resource Strain: Not on file   Food Insecurity: Not on file   Transportation Needs: Not on file   Physical Activity: Not on file   Stress: Not on file   Social Connections: Not on file   Intimate Partner Violence: Not on file   Housing Stability: Not on file     Social History     Occupational History    Not on file   Tobacco Use    Smoking status: Every Day     Packs/day: 0.50     Years: 13.00     Pack years: 6.50     Types: Cigarettes    Smokeless tobacco: Never   Vaping Use    Vaping Use: Never used   Substance and Sexual Activity    Alcohol use: No    Drug use: Not Currently     Types: Methamphetamines (Crystal Meth)     Comment: Pedro Pablo Garcia 1/26/21    Sexual activity: Yes     Partners: Male       CURRENT MEDICATIONS     Current Outpatient Medications:     QUEtiapine (SEROQUEL) 100 MG tablet, Take 1 tablet by mouth 2 times daily, Disp: 60 tablet, Rfl: 0    methadone (DOLOPHINE) 10 MG/ML solution, Take 100 mg by mouth every morning. , Disp: , Rfl:     cloNIDine (CATAPRES) 0.2 MG tablet, Take 0.5 tablets by mouth 2 times daily (Patient not taking: Reported on 1/4/2023), Disp: 60 tablet, Rfl: 1    SUMAtriptan (IMITREX) 50 MG tablet, Take 1 tablet by mouth as needed for Migraine (Patient not taking: Reported on 1/4/2023), Disp: 8 tablet, Rfl: 0    topiramate (TOPAMAX) 25 MG tablet, Take 1 tablet by mouth nightly (Patient not taking: Reported on 1/4/2023), Disp: 60 tablet, Rfl: 0    ALLERGIES  No Known Allergies    Controlled Substances Monitoring:        REVIEW OF SYSTEMS:     Constitutional:  Negative for weight loss, fevers, chills, fatigue  Cardiovascular: Negative for chest pain, palpitations  Pulmonary: Negative for shortness of breath, labored breathing, cough  GI: negative for abdominal pain, nausea, vomitting   MSK: per HPI  Skin: negative for rash, open wounds    All other systems reviewed and are negative       PHYSICAL EXAM     Vitals:    01/04/23 0945   Weight: 209 lb (94.8 kg)   Height: 5' 4\" (1.626 m)       Height: 5' 4\" (1.626 m)  Weight: [unfilled]  BMI:  Body mass index is 35.87 kg/m². General: The patient is alert and oriented x 3, appears to be stated age and in no distress. HEENT: head is normocephalic, atraumatic. EOMI. Neck: supple, trachea midline, no thyromegaly   Cardiovascular: peripheral pulses palpable. Normal Capillary refill   Respiratory: breathing unlabored, chest expansion symmetric   Skin: no rash, no open wounds, no erythema  Psych: normal affect; mood stable  Neurologic: gait normal, sensation grossly intact in extremities  MSK:    Ankle:   Splint left intact about the right lower extremity due to close reduction performed prior. Her toes are visible with appropriate color. Sensation is intact to the exposed aspects of the toes. No signs of deformity noted proximal to the splint. IMAGING:    XR: Xray of the right ankle shows bimalleolar ankle fracture including displaced transverse medial malleolus fracture as well as short oblique distal fibula fracture with lateral translation. Slight medial clear space widening. Interval x-rays performed on 12/26/2022 demonstrate close reduction with more appropriate alignment at the ankle joint. Repeat imaging obtained in office today demonstrates bimalleolar ankle fracture in appropriate alignment with overlying splint material.    Imaging discussed with patient and imaging was independently reviewed by myself today. ASSESSMENT  Right ankle bimalleolar fracture  Anemia  Drug abuse-current methadone use     PLAN  Patient was seen and examined in office today.   We did discuss her history, exam and imaging in detail. She was found to have right ankle bimalleolar fracture that underwent closed reduction last week. We did discuss treatment options including nonoperative and operative management. Strong recommendations were made for operative fixation given the fracture pattern. Fixation would include open reduction internal fixation of the distal fibula and medial malleolus with intraoperative stress of the syndesmosis with possible syndesmotic fixation. We did also mention patient that if her skin is not amenable to definitive fixation on Friday for her anticipated surgery date, we may have to perform external fixation and stage her definitive procedure for later date. Patient is to remain nonweightbearing to the right lower extremity. Patient encouraged to continue strict elevation with ice and ibuprofen to assist in edema control. Patient will be provided with a short course of pain medication to hold her over until surgery. She understands that she will need to be nonweightbearing for 6 weeks after surgery for a good result. Risks, benefits, and alternatives were discussed with the patient and their family. Risks include but are not limited to infection, blood loss, damage to neurovascular and musculoskeletal structures, malunion, nonunion, symptomatic hardware, need for further surgery, possible arthritis despite surgical stabilization, stiffness, and catastrophic anesthesia complications. They understand and wish to proceed.        Andreina Bishop, DO  Orthopaedic Surgery   1/4/23   9:58 AM EST

## 2023-01-04 NOTE — PROGRESS NOTES
[address]   PRE-ADMISSION TESTING GENERAL INSTRUCTIONS  [facility] Phone Number: [phone]      GENERAL INSTRUCTIONS:    [x] Antibacterial Soap Shower Night before and/or AM of surgery. [x] Do not wear makeup, lotions, powders, deodorant. [x] Nothing by mouth after midnight; including gum, candy, mints, or water. [x] You may brush your teeth, gargle, but do NOT swallow water. [x] No tobacco products, illegal drugs, marijuana or alcohol within 24 hours of your surgery. [x] Jewelry or valuables should not be brought to the hospital. All body and/or tongue piercing's must be removed prior to arriving to hospital. No contact lens or hair pins. [x] Arrange transportation with a responsible adult  to and from the hospital. Arrange for someone to be with you for the remainder of the day and for 24 hours after your procedure due to having had anesthesia. -Who will be your  for transportation? Elsa, mom        -Who will be staying with you for 24 hrs after your procedure? Elsa, mom  [x] Bring insurance card and photo ID. [x] Bring copy of living will or healthcare power of  papers to be placed in your electronic record. [x] Urine Pregnancy test will be preformed the day of surgery. A specimen sample may be brought from home. PARKING INSTRUCTIONS:     [x] ARRIVAL DATE  1/6 & TIME   7:30 am:   [x] Enter into the [employer]. Two people may accompany you. [x] Parking lot '\"I\"  is located on [facility] (the corner of [facility]). To enter the lot,you will need to get a parking ticket at the gate . To exit you will be given a free parking voucher. You will need to insert the voucher before the ticket to exit the parking lot. One car per patient is allowed to park in this lot. Walk up the front walk to the [facility], the door will be locked an employee will greet you and let you in. EDUCATION INSTRUCTIONS:         [x] [address] placed in chart. [x] Pain: Post-op pain is normal and to be expected. You will be asked to rate your pain from 0-10. MEDICATION INSTRUCTIONS:    [x] Bring a complete list of your medications, please write the last time you took the medicine, give this list to the nurse in Pre-Op. [x] Take only the following medications the morning of surgery with 1-2 ounces of water:   Methadone        [x] Stop all herbal supplements and vitamins 5 days before surgery. Stop ibuprofen (NSAIDS) 7 days before surgery. [x] Follow physician instructions regarding any blood thinners you may be taking. WHAT TO EXPECT:    [x] The day of surgery you will be greeted and checked in by the [employer].  In addition, you will be registered in the [facility] by a Patient Access Representative. Please bring your photo ID and insurance card. A nurse will greet you in accordance to the time you are needed in the pre-op area to prepare you for surgery. Please do not be discouraged if you are not greeted in the order you arrive as there are many variables that are involved in patient preparation. Your patience is greatly appreciated as you wait for your nurse. Please bring in items such as: books, magazines, newspapers, electronics, or any other items  to occupy your time in the waiting area. [x]  Delays may occur with surgery and staff will make a sincere effort to keep you informed of delays. If any delays occur with your procedure, we apologize ahead of time for your inconvenience as we recognize the value of your time.

## 2023-01-05 ENCOUNTER — ANESTHESIA EVENT (OUTPATIENT)
Dept: OPERATING ROOM | Age: 33
End: 2023-01-05
Payer: MEDICAID

## 2023-01-06 ENCOUNTER — HOSPITAL ENCOUNTER (OUTPATIENT)
Age: 33
Setting detail: OUTPATIENT SURGERY
Discharge: HOME | End: 2023-01-06
Attending: STUDENT IN AN ORGANIZED HEALTH CARE EDUCATION/TRAINING PROGRAM | Admitting: STUDENT IN AN ORGANIZED HEALTH CARE EDUCATION/TRAINING PROGRAM
Payer: MEDICAID

## 2023-01-06 ENCOUNTER — APPOINTMENT (OUTPATIENT)
Dept: GENERAL RADIOLOGY | Age: 33
End: 2023-01-06
Attending: STUDENT IN AN ORGANIZED HEALTH CARE EDUCATION/TRAINING PROGRAM
Payer: MEDICAID

## 2023-01-06 ENCOUNTER — ANESTHESIA (OUTPATIENT)
Dept: OPERATING ROOM | Age: 33
End: 2023-01-06
Payer: MEDICAID

## 2023-01-06 VITALS
WEIGHT: 209 LBS | TEMPERATURE: 97 F | OXYGEN SATURATION: 100 % | HEIGHT: 64 IN | RESPIRATION RATE: 16 BRPM | SYSTOLIC BLOOD PRESSURE: 113 MMHG | DIASTOLIC BLOOD PRESSURE: 91 MMHG | BODY MASS INDEX: 35.68 KG/M2 | HEART RATE: 80 BPM

## 2023-01-06 DIAGNOSIS — F19.10 POLYSUBSTANCE ABUSE (HCC): ICD-10-CM

## 2023-01-06 DIAGNOSIS — S82.841A ANKLE FRACTURE, BIMALLEOLAR, CLOSED, RIGHT, INITIAL ENCOUNTER: ICD-10-CM

## 2023-01-06 DIAGNOSIS — Z01.812 PRE-OPERATIVE LABORATORY EXAMINATION: Primary | ICD-10-CM

## 2023-01-06 LAB
HCG, URINE, POC: NEGATIVE
Lab: NORMAL
NEGATIVE QC PASS/FAIL: NORMAL
POSITIVE QC PASS/FAIL: NORMAL

## 2023-01-06 PROCEDURE — 2580000003 HC RX 258: Performed by: NURSE ANESTHETIST, CERTIFIED REGISTERED

## 2023-01-06 PROCEDURE — 6360000002 HC RX W HCPCS: Performed by: STUDENT IN AN ORGANIZED HEALTH CARE EDUCATION/TRAINING PROGRAM

## 2023-01-06 PROCEDURE — 2500000003 HC RX 250 WO HCPCS: Performed by: NURSE ANESTHETIST, CERTIFIED REGISTERED

## 2023-01-06 PROCEDURE — 3600000016 HC SURGERY LEVEL 6 ADDTL 15MIN: Performed by: STUDENT IN AN ORGANIZED HEALTH CARE EDUCATION/TRAINING PROGRAM

## 2023-01-06 PROCEDURE — 2580000003 HC RX 258: Performed by: STUDENT IN AN ORGANIZED HEALTH CARE EDUCATION/TRAINING PROGRAM

## 2023-01-06 PROCEDURE — 6360000002 HC RX W HCPCS: Performed by: NURSE ANESTHETIST, CERTIFIED REGISTERED

## 2023-01-06 PROCEDURE — 7100000001 HC PACU RECOVERY - ADDTL 15 MIN: Performed by: STUDENT IN AN ORGANIZED HEALTH CARE EDUCATION/TRAINING PROGRAM

## 2023-01-06 PROCEDURE — 3209999900 FLUORO FOR SURGICAL PROCEDURES

## 2023-01-06 PROCEDURE — 2720000010 HC SURG SUPPLY STERILE: Performed by: STUDENT IN AN ORGANIZED HEALTH CARE EDUCATION/TRAINING PROGRAM

## 2023-01-06 PROCEDURE — 3700000000 HC ANESTHESIA ATTENDED CARE: Performed by: STUDENT IN AN ORGANIZED HEALTH CARE EDUCATION/TRAINING PROGRAM

## 2023-01-06 PROCEDURE — 6360000002 HC RX W HCPCS: Performed by: ANESTHESIOLOGY

## 2023-01-06 PROCEDURE — 3600000006 HC SURGERY LEVEL 6 BASE: Performed by: STUDENT IN AN ORGANIZED HEALTH CARE EDUCATION/TRAINING PROGRAM

## 2023-01-06 PROCEDURE — 7100000011 HC PHASE II RECOVERY - ADDTL 15 MIN: Performed by: STUDENT IN AN ORGANIZED HEALTH CARE EDUCATION/TRAINING PROGRAM

## 2023-01-06 PROCEDURE — 64447 NJX AA&/STRD FEMORAL NRV IMG: CPT | Performed by: ANESTHESIOLOGY

## 2023-01-06 PROCEDURE — C1713 ANCHOR/SCREW BN/BN,TIS/BN: HCPCS | Performed by: STUDENT IN AN ORGANIZED HEALTH CARE EDUCATION/TRAINING PROGRAM

## 2023-01-06 PROCEDURE — 64445 NJX AA&/STRD SCIATIC NRV IMG: CPT | Performed by: ANESTHESIOLOGY

## 2023-01-06 PROCEDURE — 3700000001 HC ADD 15 MINUTES (ANESTHESIA): Performed by: STUDENT IN AN ORGANIZED HEALTH CARE EDUCATION/TRAINING PROGRAM

## 2023-01-06 PROCEDURE — 7100000010 HC PHASE II RECOVERY - FIRST 15 MIN: Performed by: STUDENT IN AN ORGANIZED HEALTH CARE EDUCATION/TRAINING PROGRAM

## 2023-01-06 PROCEDURE — 7100000000 HC PACU RECOVERY - FIRST 15 MIN: Performed by: STUDENT IN AN ORGANIZED HEALTH CARE EDUCATION/TRAINING PROGRAM

## 2023-01-06 PROCEDURE — 2709999900 HC NON-CHARGEABLE SUPPLY: Performed by: STUDENT IN AN ORGANIZED HEALTH CARE EDUCATION/TRAINING PROGRAM

## 2023-01-06 DEVICE — SCREW BNE L20MM DIA3.5MM CORT S STL ST NONCANNULATED LOK: Type: IMPLANTABLE DEVICE | Site: ANKLE | Status: FUNCTIONAL

## 2023-01-06 DEVICE — SCREW BNE L14MM DIA3.5MM CORT S STL ST LOK FULL THRD: Type: IMPLANTABLE DEVICE | Site: ANKLE | Status: FUNCTIONAL

## 2023-01-06 DEVICE — SCREW BNE L65MM DIA3.5MM CORT S STL ST FULL THRD HEX RECESS: Type: IMPLANTABLE DEVICE | Site: ANKLE | Status: FUNCTIONAL

## 2023-01-06 DEVICE — SCREW BNE L12MM DIA3.5MM CORT S STL ST NONCANNULATED LOK: Type: IMPLANTABLE DEVICE | Site: ANKLE | Status: FUNCTIONAL

## 2023-01-06 DEVICE — PLATE BNE L81MM 7 H S STL 1/3 TBLR LOK COMPR W/ CLLR FOR: Type: IMPLANTABLE DEVICE | Site: ANKLE | Status: FUNCTIONAL

## 2023-01-06 DEVICE — SCREW BNE L14MM DIA3.5MM CORT S STL ST NONCANNULATED LOK: Type: IMPLANTABLE DEVICE | Site: ANKLE | Status: FUNCTIONAL

## 2023-01-06 DEVICE — SCREW BNE L20MM DIA2.7MM CORT S STL ST FULL THRD FOR SM: Type: IMPLANTABLE DEVICE | Site: ANKLE | Status: FUNCTIONAL

## 2023-01-06 RX ORDER — ROPIVACAINE HYDROCHLORIDE 5 MG/ML
40 INJECTION, SOLUTION EPIDURAL; INFILTRATION; PERINEURAL ONCE
Status: DISCONTINUED | OUTPATIENT
Start: 2023-01-06 | End: 2023-01-06 | Stop reason: HOSPADM

## 2023-01-06 RX ORDER — MIDAZOLAM HYDROCHLORIDE 1 MG/ML
INJECTION INTRAMUSCULAR; INTRAVENOUS PRN
Status: DISCONTINUED | OUTPATIENT
Start: 2023-01-06 | End: 2023-01-06 | Stop reason: SDUPTHER

## 2023-01-06 RX ORDER — ONDANSETRON 2 MG/ML
INJECTION INTRAMUSCULAR; INTRAVENOUS PRN
Status: DISCONTINUED | OUTPATIENT
Start: 2023-01-06 | End: 2023-01-06 | Stop reason: SDUPTHER

## 2023-01-06 RX ORDER — ROPIVACAINE HYDROCHLORIDE 5 MG/ML
60 INJECTION, SOLUTION EPIDURAL; INFILTRATION; PERINEURAL ONCE
Status: COMPLETED | OUTPATIENT
Start: 2023-01-06 | End: 2023-01-06

## 2023-01-06 RX ORDER — SODIUM CHLORIDE 0.9 % (FLUSH) 0.9 %
5-40 SYRINGE (ML) INJECTION EVERY 12 HOURS SCHEDULED
Status: DISCONTINUED | OUTPATIENT
Start: 2023-01-06 | End: 2023-01-06 | Stop reason: SDUPTHER

## 2023-01-06 RX ORDER — SODIUM CHLORIDE 9 MG/ML
INJECTION, SOLUTION INTRAVENOUS PRN
Status: DISCONTINUED | OUTPATIENT
Start: 2023-01-06 | End: 2023-01-06 | Stop reason: SDUPTHER

## 2023-01-06 RX ORDER — LIDOCAINE HYDROCHLORIDE 20 MG/ML
INJECTION, SOLUTION EPIDURAL; INFILTRATION; INTRACAUDAL; PERINEURAL PRN
Status: DISCONTINUED | OUTPATIENT
Start: 2023-01-06 | End: 2023-01-06 | Stop reason: SDUPTHER

## 2023-01-06 RX ORDER — MEPERIDINE HYDROCHLORIDE 25 MG/ML
12.5 INJECTION INTRAMUSCULAR; INTRAVENOUS; SUBCUTANEOUS
Status: DISCONTINUED | OUTPATIENT
Start: 2023-01-06 | End: 2023-01-06 | Stop reason: SDUPTHER

## 2023-01-06 RX ORDER — METHOCARBAMOL 500 MG/1
500 TABLET, FILM COATED ORAL 4 TIMES DAILY
Qty: 40 TABLET | Refills: 0 | Status: SHIPPED | OUTPATIENT
Start: 2023-01-06 | End: 2023-01-16

## 2023-01-06 RX ORDER — SODIUM CHLORIDE 0.9 % (FLUSH) 0.9 %
5-40 SYRINGE (ML) INJECTION PRN
Status: DISCONTINUED | OUTPATIENT
Start: 2023-01-06 | End: 2023-01-06 | Stop reason: HOSPADM

## 2023-01-06 RX ORDER — DEXAMETHASONE SODIUM PHOSPHATE 10 MG/ML
4 INJECTION, EMULSION INTRAMUSCULAR; INTRAVENOUS ONCE
Status: CANCELLED | OUTPATIENT
Start: 2023-01-06 | End: 2023-01-06

## 2023-01-06 RX ORDER — KETAMINE HYDROCHLORIDE 10 MG/ML
INJECTION INTRAMUSCULAR; INTRAVENOUS PRN
Status: DISCONTINUED | OUTPATIENT
Start: 2023-01-06 | End: 2023-01-06 | Stop reason: SDUPTHER

## 2023-01-06 RX ORDER — MIDAZOLAM HYDROCHLORIDE 2 MG/2ML
1 INJECTION, SOLUTION INTRAMUSCULAR; INTRAVENOUS PRN
Status: DISCONTINUED | OUTPATIENT
Start: 2023-01-06 | End: 2023-01-06 | Stop reason: HOSPADM

## 2023-01-06 RX ORDER — ONDANSETRON 2 MG/ML
4 INJECTION INTRAMUSCULAR; INTRAVENOUS
Status: DISCONTINUED | OUTPATIENT
Start: 2023-01-06 | End: 2023-01-06 | Stop reason: HOSPADM

## 2023-01-06 RX ORDER — ASPIRIN 325 MG
325 TABLET, DELAYED RELEASE (ENTERIC COATED) ORAL DAILY
Qty: 30 TABLET | Refills: 11 | Status: SHIPPED | OUTPATIENT
Start: 2023-01-06 | End: 2024-01-06

## 2023-01-06 RX ORDER — SODIUM CHLORIDE 0.9 % (FLUSH) 0.9 %
5-40 SYRINGE (ML) INJECTION EVERY 12 HOURS SCHEDULED
Status: DISCONTINUED | OUTPATIENT
Start: 2023-01-06 | End: 2023-01-06 | Stop reason: HOSPADM

## 2023-01-06 RX ORDER — FENTANYL CITRATE 50 UG/ML
25 INJECTION, SOLUTION INTRAMUSCULAR; INTRAVENOUS ONCE
Status: DISCONTINUED | OUTPATIENT
Start: 2023-01-06 | End: 2023-01-06 | Stop reason: HOSPADM

## 2023-01-06 RX ORDER — DEXAMETHASONE SODIUM PHOSPHATE 10 MG/ML
4 INJECTION, SOLUTION INTRAMUSCULAR; INTRAVENOUS ONCE
Status: DISCONTINUED | OUTPATIENT
Start: 2023-01-06 | End: 2023-01-06 | Stop reason: HOSPADM

## 2023-01-06 RX ORDER — FENTANYL CITRATE 50 UG/ML
100 INJECTION, SOLUTION INTRAMUSCULAR; INTRAVENOUS ONCE
Status: DISCONTINUED | OUTPATIENT
Start: 2023-01-06 | End: 2023-01-06 | Stop reason: HOSPADM

## 2023-01-06 RX ORDER — SODIUM CHLORIDE 9 MG/ML
INJECTION, SOLUTION INTRAVENOUS CONTINUOUS
Status: DISCONTINUED | OUTPATIENT
Start: 2023-01-06 | End: 2023-01-06 | Stop reason: HOSPADM

## 2023-01-06 RX ORDER — OXYCODONE AND ACETAMINOPHEN 7.5; 325 MG/1; MG/1
1 TABLET ORAL EVERY 6 HOURS PRN
Qty: 28 TABLET | Refills: 0 | Status: SHIPPED | OUTPATIENT
Start: 2023-01-06 | End: 2023-01-13 | Stop reason: SDUPTHER

## 2023-01-06 RX ORDER — DEXAMETHASONE SODIUM PHOSPHATE 10 MG/ML
INJECTION, EMULSION INTRAMUSCULAR; INTRAVENOUS PRN
Status: DISCONTINUED | OUTPATIENT
Start: 2023-01-06 | End: 2023-01-06 | Stop reason: SDUPTHER

## 2023-01-06 RX ORDER — ONDANSETRON 2 MG/ML
4 INJECTION INTRAMUSCULAR; INTRAVENOUS
Status: DISCONTINUED | OUTPATIENT
Start: 2023-01-06 | End: 2023-01-06 | Stop reason: SDUPTHER

## 2023-01-06 RX ORDER — ROPIVACAINE HYDROCHLORIDE 5 MG/ML
INJECTION, SOLUTION EPIDURAL; INFILTRATION; PERINEURAL
Status: COMPLETED | OUTPATIENT
Start: 2023-01-06 | End: 2023-01-06

## 2023-01-06 RX ORDER — ALBUTEROL SULFATE 90 UG/1
2 AEROSOL, METERED RESPIRATORY (INHALATION) EVERY 6 HOURS PRN
Status: DISCONTINUED | OUTPATIENT
Start: 2023-01-06 | End: 2023-01-06 | Stop reason: HOSPADM

## 2023-01-06 RX ORDER — PROPOFOL 10 MG/ML
INJECTION, EMULSION INTRAVENOUS PRN
Status: DISCONTINUED | OUTPATIENT
Start: 2023-01-06 | End: 2023-01-06 | Stop reason: SDUPTHER

## 2023-01-06 RX ORDER — ROPIVACAINE HYDROCHLORIDE 5 MG/ML
60 INJECTION, SOLUTION EPIDURAL; INFILTRATION; PERINEURAL ONCE
Status: CANCELLED | OUTPATIENT
Start: 2023-01-06 | End: 2023-01-06

## 2023-01-06 RX ORDER — MIDAZOLAM HYDROCHLORIDE 2 MG/2ML
1 INJECTION, SOLUTION INTRAMUSCULAR; INTRAVENOUS PRN
Status: CANCELLED | OUTPATIENT
Start: 2023-01-06

## 2023-01-06 RX ORDER — MELOXICAM 7.5 MG/1
7.5 TABLET ORAL DAILY
Qty: 30 TABLET | Refills: 0 | Status: SHIPPED | OUTPATIENT
Start: 2023-01-06

## 2023-01-06 RX ORDER — SODIUM CHLORIDE 0.9 % (FLUSH) 0.9 %
5-40 SYRINGE (ML) INJECTION PRN
Status: DISCONTINUED | OUTPATIENT
Start: 2023-01-06 | End: 2023-01-06 | Stop reason: SDUPTHER

## 2023-01-06 RX ORDER — DEXAMETHASONE SODIUM PHOSPHATE 10 MG/ML
4 INJECTION, SOLUTION INTRAMUSCULAR; INTRAVENOUS ONCE
Status: COMPLETED | OUTPATIENT
Start: 2023-01-06 | End: 2023-01-06

## 2023-01-06 RX ORDER — SODIUM CHLORIDE 9 MG/ML
INJECTION, SOLUTION INTRAVENOUS PRN
Status: DISCONTINUED | OUTPATIENT
Start: 2023-01-06 | End: 2023-01-06 | Stop reason: HOSPADM

## 2023-01-06 RX ORDER — GLYCOPYRROLATE 1 MG/5 ML
SYRINGE (ML) INTRAVENOUS PRN
Status: DISCONTINUED | OUTPATIENT
Start: 2023-01-06 | End: 2023-01-06 | Stop reason: SDUPTHER

## 2023-01-06 RX ORDER — SODIUM CHLORIDE 9 MG/ML
INJECTION, SOLUTION INTRAVENOUS CONTINUOUS PRN
Status: DISCONTINUED | OUTPATIENT
Start: 2023-01-06 | End: 2023-01-06 | Stop reason: SDUPTHER

## 2023-01-06 RX ORDER — MIDAZOLAM HYDROCHLORIDE 2 MG/2ML
2 INJECTION, SOLUTION INTRAMUSCULAR; INTRAVENOUS EVERY 10 MIN PRN
Status: DISCONTINUED | OUTPATIENT
Start: 2023-01-06 | End: 2023-01-06 | Stop reason: HOSPADM

## 2023-01-06 RX ORDER — MEPERIDINE HYDROCHLORIDE 25 MG/ML
12.5 INJECTION INTRAMUSCULAR; INTRAVENOUS; SUBCUTANEOUS
Status: DISCONTINUED | OUTPATIENT
Start: 2023-01-06 | End: 2023-01-06 | Stop reason: HOSPADM

## 2023-01-06 RX ORDER — KETOROLAC TROMETHAMINE 30 MG/ML
INJECTION, SOLUTION INTRAMUSCULAR; INTRAVENOUS PRN
Status: DISCONTINUED | OUTPATIENT
Start: 2023-01-06 | End: 2023-01-06 | Stop reason: SDUPTHER

## 2023-01-06 RX ADMIN — CEFAZOLIN 2000 MG: 2 INJECTION, POWDER, FOR SOLUTION INTRAMUSCULAR; INTRAVENOUS at 14:00

## 2023-01-06 RX ADMIN — ROPIVACAINE HYDROCHLORIDE 60 ML: 5 INJECTION, SOLUTION EPIDURAL; INFILTRATION; PERINEURAL at 12:29

## 2023-01-06 RX ADMIN — SODIUM CHLORIDE: 9 INJECTION, SOLUTION INTRAVENOUS at 14:38

## 2023-01-06 RX ADMIN — SODIUM CHLORIDE: 9 INJECTION, SOLUTION INTRAVENOUS at 09:36

## 2023-01-06 RX ADMIN — MIDAZOLAM 2 MG: 1 INJECTION INTRAMUSCULAR; INTRAVENOUS at 13:44

## 2023-01-06 RX ADMIN — DEXAMETHASONE SODIUM PHOSPHATE 4 MG: 10 INJECTION, SOLUTION INTRAMUSCULAR; INTRAVENOUS at 12:29

## 2023-01-06 RX ADMIN — MIDAZOLAM 2 MG: 1 INJECTION INTRAMUSCULAR; INTRAVENOUS at 12:28

## 2023-01-06 RX ADMIN — SODIUM CHLORIDE: 9 INJECTION, SOLUTION INTRAVENOUS at 12:30

## 2023-01-06 RX ADMIN — ROPIVACAINE HYDROCHLORIDE 30 ML: 5 INJECTION, SOLUTION EPIDURAL; INFILTRATION; PERINEURAL at 12:31

## 2023-01-06 RX ADMIN — KETOROLAC TROMETHAMINE 30 MG: 30 INJECTION, SOLUTION INTRAMUSCULAR; INTRAVENOUS at 14:00

## 2023-01-06 RX ADMIN — Medication 0.2 MG: at 13:45

## 2023-01-06 RX ADMIN — KETAMINE HYDROCHLORIDE 30 MG: 10 INJECTION, SOLUTION INTRAMUSCULAR; INTRAVENOUS at 13:53

## 2023-01-06 RX ADMIN — MIDAZOLAM 2 MG: 1 INJECTION INTRAMUSCULAR; INTRAVENOUS at 13:53

## 2023-01-06 RX ADMIN — PHENYLEPHRINE HYDROCHLORIDE 100 MCG: 10 INJECTION, SOLUTION INTRAMUSCULAR; INTRAVENOUS; SUBCUTANEOUS at 14:42

## 2023-01-06 RX ADMIN — LIDOCAINE HYDROCHLORIDE 100 MG: 20 INJECTION, SOLUTION EPIDURAL; INFILTRATION; INTRACAUDAL; PERINEURAL at 13:52

## 2023-01-06 RX ADMIN — DEXAMETHASONE SODIUM PHOSPHATE 10 MG: 10 INJECTION, EMULSION INTRAMUSCULAR; INTRAVENOUS at 13:45

## 2023-01-06 RX ADMIN — ROPIVACAINE HYDROCHLORIDE 30 ML: 5 INJECTION EPIDURAL; INFILTRATION; PERINEURAL at 12:36

## 2023-01-06 RX ADMIN — PROPOFOL 200 MG: 10 INJECTION, EMULSION INTRAVENOUS at 13:52

## 2023-01-06 RX ADMIN — ONDANSETRON HYDROCHLORIDE 4 MG: 2 SOLUTION INTRAMUSCULAR; INTRAVENOUS at 13:45

## 2023-01-06 ASSESSMENT — PAIN SCALES - GENERAL
PAINLEVEL_OUTOF10: 0
PAINLEVEL_OUTOF10: 0

## 2023-01-06 ASSESSMENT — PAIN - FUNCTIONAL ASSESSMENT: PAIN_FUNCTIONAL_ASSESSMENT: 0-10

## 2023-01-06 ASSESSMENT — LIFESTYLE VARIABLES: SMOKING_STATUS: 1

## 2023-01-06 NOTE — PROGRESS NOTES
Patient did not want to wait for [facility] discharge instructions gone over with patient and patients mother , all questions answered at this time.  After patients discharge vandana called and informed at patient did not want to wait and was discharged

## 2023-01-06 NOTE — DISCHARGE INSTRUCTIONS
[facility]) Department of Orthopedic Surgery  [address] [place] [address]    Dr. Tanya Rose DO    Orthopaedics Discharge Instructions   Weight bearing Status - Non-weight bearing - on right lower Extremity  Pain medication Per Prescriptions  Contact Office for Medication Refill- [phone]  Office can refill pain med every 7 days  If patient discharging to facility then pain control will be continued per facility physician  Ice to operative/injured site for 15-30 minutes of each hour for next 5 days    Recommend that you continue to ice the area 2-3 times per day after this   Elevate operative/injured limb on 2 pillows at home  Goal is to have limb above the heart if able  Continue DVT Prophylaxis (blood clot prevention) as Prescribed: Take aspirin 325 mg daily as prescribed  Wound care -keep splint in place clean and dry at all times  Fracture Care -strict nonweightbearing right lower extremity  Follow Up in Office in 2 weeks. Your first post op appointment is often with one of our PAs. Call the office at [phone] for directions or with any questions. Watch for these significant complications. Call physician if they or any other problems occur:  Fever over 101°, redness, swelling or warmth at the operative site  Unrelieved nausea    Foul smelling or cloudy drainage at the operative site   Unrelieved pain    Blood soaked dressing. (Some oozing may be normal)     Numb, pale, blue, cold or tingling extremity    Future Appointments   Date Time Provider Sung Gates   1/18/2023  1:10 PM Rosaura Weeks DO BD ORTHO [facility]         It is the Department of Orthopaedic Trauma's standard of practice that providers will de-escalate(wean) all patients from narcotic(opioid) medications during the post-operative period. We provide multimodal pain control but opioid medications are tapered in all of our patients.   If patient requires referral to pain management for prolonged taper off of opioid pain medication we will facilitate this process.

## 2023-01-06 NOTE — ANESTHESIA PROCEDURE NOTES
Peripheral Block    Patient location during procedure: pre-op  Reason for block: post-op pain management and at surgeon's request  Start time: 1/6/2023 12:31 PM  End time: 1/6/2023 12:33 PM  Staffing  Performed: anesthesiologist   Anesthesiologist: Teja Taylor DO  Preanesthetic Checklist  Completed: patient identified, IV checked, site marked, risks and benefits discussed, surgical/procedural consents, equipment checked, pre-op evaluation, timeout performed, anesthesia consent given, oxygen available and monitors applied/VS acknowledged  Peripheral Block   Patient position: supine  Prep: alcohol swabs  Provider prep: mask and sterile gloves  Patient monitoring: cardiac monitor, continuous pulse ox, frequent blood pressure checks and IV access  Block type: Saphenous  Laterality: right  Injection technique: single-shot  Guidance: ultrasound guided  Local infiltration: ropivacaine  Local infiltration: ropivacaine    Needle   Needle type: combined needle/nerve stimulator   Needle gauge: 22 G  Needle localization: ultrasound guidance  Needle length: 5 cm  Assessment   Injection assessment: negative aspiration for heme, no paresthesia on injection and local visualized surrounding nerve on ultrasound  Paresthesia pain: none  Slow fractionated injection: yes  Hemodynamics: stable  Real-time US image taken/store: yes  Outcomes: uncomplicated and patient tolerated procedure well    Additional Notes  Timeout performed  Medications Administered  ropivacaine (NAROPIN) injection 0.5% - Perineural   30 mL - 1/6/2023 12:31:00 PM

## 2023-01-06 NOTE — ANESTHESIA PRE PROCEDURE
Department of Anesthesiology  Preprocedure Note       Name:  Torrey Brown   Age:  29 y.o.  :  1990                                          MRN:  [id number]         Date:  2023      Surgeon: Cathleen Schaeffer):  Stanton Reina DO    Procedure: Procedure(s):  RIGHT ANKLE ORIF VS EXTERNAL FIXATION, SYNTHES, NEEDS NERVE BLOCK    Medications prior to admission:   Prior to Admission medications    Medication Sig Start Date End Date Taking? Authorizing Provider   oxyCODONE-acetaminophen (PERCOCET) 7.5-325 MG per tablet Take 1 tablet by mouth every 6 hours as needed for Pain for up to 7 days. Intended supply: 30 days Max Daily Amount: 4 tablets 23 Yes Stanton Reina DO   aspirin 325 MG EC tablet Take 1 tablet by mouth daily 23 Yes Chandler Mckinney DO   QUEtiapine (SEROQUEL) 100 MG tablet Take 1 tablet by mouth 2 times daily 5/10/22   Judy Cooper MD   methadone (DOLOPHINE) 10 MG/ML solution Take 100 mg by mouth every morning.      Historical Provider, MD   ketorolac (TORADOL) 10 MG tablet Take 1 tablet by mouth every 6 hours as needed for Pain 21  Nancy Deshpande PA-C       Current medications:    Current Facility-Administered Medications   Medication Dose Route Frequency Provider Last Rate Last Admin    0.9 % sodium chloride infusion   IntraVENous Continuous Stanton Pac,  mL/hr at 23 0936 New Bag at 23 0936    sodium chloride flush 0.9 % injection 5-40 mL  5-40 mL IntraVENous 2 times per day Stanton Pac, DO        sodium chloride flush 0.9 % injection 5-40 mL  5-40 mL IntraVENous PRN Stanton Pac, DO        0.9 % sodium chloride infusion   IntraVENous PRN Stanton Pac, DO        ceFAZolin (ANCEF) 2,000 mg in sterile water 20 mL IV syringe  2,000 mg IntraVENous On Call to OR Stanton Pac, DO        midazolam PF (VERSED) injection 1 mg  1 mg IntraVENous PRN Unk Beat, DO   2 mg at 23 1228       Allergies:  No Known Allergies    Problem List:    Patient Active Problem List   Diagnosis Code    Encounter for fetal anatomic survey Z36.89    Suspected shortening of cervix not found Z03.75    Tenosynovitis M65.9    28 weeks gestation of pregnancy Z3A.28    Nausea vomiting and diarrhea R11.2, R19.7    Normal pregnancy, antepartum Z34.90    Encounter for  screening for malformation using ultrasound Z36.3    Supervision of high risk pregnancy in third trimester O09.93    Abdominal pain affecting pregnancy O26.899, R10.9    38 weeks gestation of pregnancy Z3A.38    Preeclampsia complicating hypertension O11.9    Bradycardia R00.1    Vomiting R11.10    Abdominal pain R10.9    Acute cystitis without hematuria N30.00    Drug-induced constipation K59.03    Polysubstance abuse ([address] F19.10    Chest pain R07.9    Intractable headache R51.9       Past Medical History:        Diagnosis Date    Abnormal Pap smear of cervix     Anemia     Drug abuse ([address]     Obesity     Overdose     multiple    Preeclampsia complicating hypertension 2021       Past Surgical History:  History reviewed. No pertinent surgical history. Social History:    Social History     Tobacco Use    Smoking status: Every Day     Packs/day: 0.50     Years: 13.00     Pack years: 6.50     Types: Cigarettes    Smokeless tobacco: Never   Substance Use Topics    Alcohol use:  No                                Ready to quit: Yes  Counseling given: Not Answered      Vital Signs (Current):   Vitals:    23 1146 23 0928 23 1225 23 1230   BP:  133/86 126/81 (!) 111/97   Pulse:  75 71 74   Resp:  18 14 22   Temp:  98.3 °F (36.8 °C)     TempSrc:  Temporal     SpO2:  99% 98% 100%   Weight: 209 lb (94.8 kg) 209 lb (94.8 kg)     Height: 5' 4\" (1.626 m) 5' 4\" (1.626 m)                                                BP Readings from Last 3 Encounters:   23 (!) 111/97   22 (!) 141/98   22 (!) 145/101       NPO Status: Time of last liquid consumption: 2100                        Time of last solid consumption: 2100                        Date of last liquid consumption: 01/05/23                        Date of last solid food consumption: 01/05/23    BMI:   Wt Readings from Last 3 Encounters:   01/06/23 209 lb (94.8 kg)   01/04/23 209 lb (94.8 kg)   12/26/22 209 lb (94.8 kg)     Body mass index is 35.87 kg/m². CBC:   Lab Results   Component Value Date/Time    WBC 10.8 06/12/2022 05:10 AM    RBC 4.47 06/12/2022 05:10 AM    HGB 14.1 06/12/2022 05:10 AM    HCT 41.3 06/12/2022 05:10 AM    MCV 92.4 06/12/2022 05:10 AM    RDW 12.6 06/12/2022 05:10 AM     06/12/2022 05:10 AM       CMP:   Lab Results   Component Value Date/Time     06/12/2022 05:10 AM    K 3.8 06/12/2022 05:10 AM     06/12/2022 05:10 AM    CO2 19 06/12/2022 05:10 AM    BUN 10 06/12/2022 05:10 AM    CREATININE 0.7 06/12/2022 05:10 AM    GFRAA >60 06/12/2022 05:10 AM    LABGLOM >60 06/12/2022 05:10 AM    GLUCOSE 137 06/12/2022 05:10 AM    PROT 7.7 06/09/2022 11:01 PM    CALCIUM 10.0 06/12/2022 05:10 AM    BILITOT <0.2 06/09/2022 11:01 PM    ALKPHOS 117 06/09/2022 11:01 PM    AST 35 06/09/2022 11:01 PM    ALT 34 06/09/2022 11:01 PM       POC Tests: No results for input(s): POCGLU, POCNA, POCK, POCCL, POCBUN, POCHEMO, POCHCT in the last 72 hours.     Coags:   Lab Results   Component Value Date/Time    PROTIME 12.9 11/23/2019 03:35 AM    INR 1.1 11/23/2019 03:35 AM    APTT 34.8 11/23/2019 03:35 AM       HCG (If Applicable):   Lab Results   Component Value Date    PREGTESTUR NEGATIVE 05/02/2022        ABGs: No results found for: PHART, PO2ART, CWQ0TDC, TVX6PNC, BEART, B7YLLRGJ     Type & Screen (If Applicable):  No results found for: LABABO, LABRH    Drug/Infectious Status (If Applicable):  No results found for: HIV, HEPCAB    COVID-19 Screening (If Applicable):   Lab Results   Component Value Date/Time    COVID19 Not Detected 06/11/2022 07:19 PM Anesthesia Evaluation  Patient summary reviewed and Nursing notes reviewed no history of anesthetic complications:   Airway: Mallampati: III  TM distance: >3 FB   Neck ROM: full  Mouth opening: > = 3 FB   Dental:          Pulmonary: breath sounds clear to auscultation  (+) current smoker (lCurrent Every Day Smoker (pt states she smokes approx 1/2 ppd))                           Cardiovascular:            Rhythm: regular  Rate: normal                 ROS comment: Monitor indicates that pt is baseline bradycardic. Neuro/Psych:                ROS comment: Hx of drug (heroine) abuse w/ multiple overdose. Taking methadone. Urine fentanyl screen positive in February. History of back pain and tenosynovitis. Not currently treating for back pain. States that back would 'give out' from time to time even prior to pregnancy. Associates this pain as 7-8/10. GI/Hepatic/Renal: Neg GI/Hepatic/Renal ROS            Endo/Other:    (+) blood dyscrasia: anemia:., .                 Abdominal:   (+) obese,           Vascular: negative vascular ROS. Other Findings:             Anesthesia Plan      general and regional     ASA 3           MIPS: Prophylactic antiemetics administered. Anesthetic plan and risks discussed with patient. Use of blood products discussed with patient whom consented to blood products. Plan discussed with attending and CRNA.                     Ana Velásquez DO   1/6/2023

## 2023-01-06 NOTE — OP NOTE
Operative Note      Patient: Cam Charles  YOB: 1990  MRN: [id number]    Date of Procedure: 1/6/2023    Pre-Op Diagnosis: Closed right bimalleolar ankle fracture    Post-Op Diagnosis: Same       Procedure(s):  Open reduction internal fixation right bimalleolar ankle fracture    Surgeon(s):  Lino Wang DO    Assistant:   Resident: Linda Baker DO; Norberto Cleaning DO    Anesthesia: General    Estimated Blood Loss (mL): Minimal    Complications: None    Specimens:   * No specimens in log *    Implants:  Implant Name Type Inv.  Item Serial No.  Lot No. LRB No. Used Action   SCREW BNE L20MM DIA2.7MM DAVID S STL ST FULL THRD FOR  - [id number]  SCREW BNE L20MM DIA2.7MM DAVID S STL ST FULL THRD FOR   DEPUY [employer] [place]-WD  Right 1 Implanted   SCREW BNE L14MM DIA3.5MM DAVID S STL ST JULIETTE FULL THRD - [id number]  SCREW BNE L14MM DIA3.5MM DAVID S STL ST JULIETTE FULL THRD  DEPUY SYNTHES [place]-WD  Right 1 Implanted   SCREW BNE L12MM DIA3.5MM DAVID S STL ST NONCANNULATED JULIETTE - [id number]  SCREW BNE L12MM DIA3.5MM DAVID S STL ST NONCANNULATED JULIETTE  DEPUY SYNTHES USA-WD  Right 2 Implanted   SCREW BNE L14MM DIA3.5MM DAVID S STL ST NONCANNULATED JULIETTE - [id number]  SCREW BNE L14MM DIA3.5MM DAVID S STL ST NONCANNULATED JULIETTE  DEPUY SYNTHES USA-WD  Right 1 Implanted   SCREW BNE L20MM DIA3.5MM DAVID S STL ST NONCANNULATED JULIETTE - [id number]  SCREW BNE L20MM DIA3.5MM DAVID S STL ST NONCANNULATED JULIETTE  DEPUY SYNTHES USA-WD  Right 1 Implanted   SCREW BNE L65MM DIA3.5MM DAVID S STL ST FULL THRD HEX RECESS - [id number]  SCREW BNE L65MM DIA3.5MM DAVID S STL ST FULL THRD HEX RECESS  DEPUY SYNTHES USA-WD  Right 1 Implanted   PLATE BNE [id number] 7 H S STL 1/3 TBLR JULIETTE COMPR W/ CLLR FOR - [id number]  PLATE BNE [id number] 7 H S STL 1/3 TBLR JULIETTE COMPR W/ CLLR FOR  [employer] USA-WD  Right 1 Implanted         Drains: * No LDAs found *    Findings: See operative report below    Detailed Description of Procedure:   Vonnie Blanco is a 29-year-old female who had a ground-level fall at home twisting injury to her right ankle sustaining a bimalleolar ankle fracture. This was reduced in the ER and sent to my office. She was seen and examined the office. Due to the unstable nature of a bimalleolar ankle fracture with displacement and this is an operative fracture. Risk benefits alternatives surgery discussed in detail she wished to proceed. She is seen on day of surgery in preoperative holding. My initials were placed on her right leg. Again we discussed the risk benefits and alternatives surgery including blood loss infection blood clot nonunion reoperation. She understands and wishes to proceed. Informed consent was obtained and signed and placed in the chart. Preoperative block was performed on the part of anesthesia. She was rolled to the OR and general anesthesia was induced. She was placed supine on the OR table and all bony prominences were well-padded. Right leg was placed on bone foam ramp and a nonsterile tourniquet was placed on her right upper thigh. Preoperative antibiotics were infused and right leg was prepped and draped in standard sterile orthopedic fashion. Appropriate timeout was performed confirming side and site of surgery. Tourniquet was inflated to 275 mmHg. A lateral incision was created centered over the fibula and dissection is carried down to the lateral malleolus. Dissection was carried proximally the fracture site was identified. Blunt dissection was carried proximally exposing the shaft of the fibula with care to protect superficial branch peroneal nerve. There is an oblique fracture which was carefully debrided fracture hematoma using a curette and rongeur. Reduction maneuver was performed and clamped in place with a sharp sharp reduction clamp. 2.7 mm lag screw was placed anterior to posterior anatomic reducing compression fracture. Then selected a 7 hole [employer] one third tubular plate.   This was placed on posterior lateral surface of the fibula. A single axillary screw 3.5 mm cortical screw was placed at the apex of fracture with good buttress effect and good compression with good screw purchase. 2 additional cortical screws were placed in the proximal aspect of the plate. A single distal locking screw was placed PTA through the plate distally into the lateral malleolus. X-rays were taken confirming anatomic reduction of the fibula with appropriate length and adequate hardware placement. Medial incision was carried over the medial malleolus fracture. Dissection was carried down the medial malleolus with Saphenous vein. Hematoma and periosteum were debrided from the fracture site. The medial malleolus fracture was a small anterior colliculus piece. This was clamped and reduced into place and confirmed on x-ray. A single bicortical 3.5 mm cortical screw was placed under fluoroscopic guidance with good purchase and compression with anatomic reduction of medial malleolus. This point all instruments were withdrawn. This completed our fixation construct. Final x-rays were taken AP mortise stress and lateral views confirming anatomic reduction and adequate screw length and appropriate hardware placement. Tourniquet was released hemostasis was noted be adequate. All the wounds were copiously irrigated with sterile saline. They are closed in layered fashion using 2-0 Monocryl and 3-0 nylon. Patient was placed in a well-padded AO style splint. She is awake from anesthesia transferred PACU stable condition. She will be discharged home with multimodal pain control including Percocet Robaxin and Mobic. Aspirin for DVT prophylaxis has been prescribed, 325 mg once daily. She will be strict nonweightbearing for 6 weeks. She will follow-up in my office in 2 weeks.     Electronically signed by Gudelia Lancaster DO on 1/6/2023 at 3:04 PM

## 2023-01-06 NOTE — PROGRESS NOTES
Blas castanedaed dose of methadone , pharmacy called they have dose , [facility] called to verify patient dose awaiting call back

## 2023-01-06 NOTE — ANESTHESIA POSTPROCEDURE EVALUATION
Department of Anesthesiology  Postprocedure Note    Patient: Jerod Bolden  MRN: [id number]  YOB: 1990  Date of evaluation: 1/6/2023      Procedure Summary     Date: 01/06/23 Room / Location: [facility] 09 / [facility]    Anesthesia Start: 1343 Anesthesia Stop: 4228    Procedure: RIGHT ANKLE ORIF VS EXTERNAL FIXATION, SYNTHES, NEEDS NERVE BLOCK (Right: Leg Lower) Diagnosis:       Type III open fracture of right ankle, initial encounter      (Type III open fracture of right ankle, initial encounter [S82.891C])    Surgeons: Dorita Chávez DO Responsible Provider: Erich Arteaga DO    Anesthesia Type: general, regional ASA Status: 3          Anesthesia Type: No value filed.     Dede Phase I: Dede Score: 8    Dede Phase II:        Anesthesia Post Evaluation    Patient location during evaluation: PACU  Patient participation: complete - patient participated  Level of consciousness: awake and alert  Airway patency: patent  Nausea & Vomiting: no nausea and no vomiting  Complications: no  Cardiovascular status: blood pressure returned to baseline  Respiratory status: acceptable  Hydration status: euvolemic  Multimodal analgesia pain management approach

## 2023-01-06 NOTE — ANESTHESIA POSTPROCEDURE EVALUATION
Department of Anesthesiology  Postprocedure Note    Patient: Ivan Butler  MRN: [id number]  YOB: 1990  Date of evaluation: 1/6/2023      Procedure Summary     Date: 01/06/23 Room / Location: [facility] 09 / [facility]    Anesthesia Start: 1343 Anesthesia Stop: 1401    Procedure: RIGHT ANKLE ORIF VS EXTERNAL FIXATION, SYNTHES, NEEDS NERVE BLOCK (Right: Leg Lower) Diagnosis:       Type III open fracture of right ankle, initial encounter      (Type III open fracture of right ankle, initial encounter [S82.891C])    Surgeons: Ingrid Velez DO Responsible Provider: Benjamín Baxter DO    Anesthesia Type: general, regional ASA Status: 3          Anesthesia Type: No value filed.     Dede Phase I: Dede Score: 8    Dede Phase II:        Anesthesia Post Evaluation    Patient location during evaluation: PACU  Patient participation: complete - patient participated  Level of consciousness: awake and alert  Pain score: 2  Airway patency: patent  Nausea & Vomiting: no nausea and no vomiting  Complications: no  Cardiovascular status: blood pressure returned to baseline  Respiratory status: acceptable  Hydration status: euvolemic  Multimodal analgesia pain management approach    ammon hernandez, APRN - CRNA

## 2023-01-06 NOTE — PROGRESS NOTES
RN spoke with Dr. Sakina Vinson on unit to notify him that pt wished to have 5mg of percocet, a script for ativan and to be referred to Dr. Anna Currie at the pain clinic in [facility].   Per Dr. Sakina Vinson, he sent in a script for 7.5 mg percocet, he will not prescribe ativan and he will refer pt to that pain

## 2023-01-06 NOTE — ANESTHESIA POSTPROCEDURE EVALUATION
Department of Anesthesiology  Postprocedure Note    Patient: Sharron Perry  MRN: [id number]  YOB: 1990  Date of evaluation: 1/6/2023      Procedure Summary     Date: 01/06/23 Room / Location: [facility] / [facility]    Anesthesia Start: 1343 Anesthesia Stop:     Procedure: RIGHT ANKLE ORIF VS EXTERNAL FIXATION, SYNTHES, NEEDS NERVE BLOCK (Right: Leg Lower) Diagnosis:       Type III open fracture of right ankle, initial encounter      (Type III open fracture of right ankle, initial encounter [S82.891C])    Surgeons: Ruel Peoples,  Responsible Provider: Lety Díaz DO    Anesthesia Type: general, regional ASA Status: 3          Anesthesia Type: No value filed.     Dede Phase I: Dede Score: 8    Dede Phase II:        Anesthesia Post Evaluation   JIM grossman - CRNA

## 2023-01-06 NOTE — ANESTHESIA PROCEDURE NOTES
Peripheral Block    Patient location during procedure: pre-op  Reason for block: post-op pain management and at surgeon's request  Start time: 1/6/2023 12:36 PM  End time: 1/6/2023 12:38 PM  Staffing  Performed: anesthesiologist   Anesthesiologist: Ana Velásquez DO  Preanesthetic Checklist  Completed: patient identified, IV checked, site marked, risks and benefits discussed, surgical/procedural consents, equipment checked, pre-op evaluation, timeout performed, anesthesia consent given, oxygen available and monitors applied/VS acknowledged  Peripheral Block   Patient position: supine  Prep: ChloraPrep  Provider prep: mask and sterile gloves  Patient monitoring: cardiac monitor, continuous pulse ox, frequent blood pressure checks and IV access  Block type: Sciatic  Popliteal  Laterality: right  Injection technique: single-shot  Guidance: ultrasound guided  Local infiltration: lidocaine  Infiltration strength: 1 %  Local infiltration: lidocaine  Dose: 3 mL    Needle   Needle gauge: 21 G  Needle localization: ultrasound guidance  Needle length: 10 cm  Assessment   Injection assessment: negative aspiration for heme, no paresthesia on injection and local visualized surrounding nerve on ultrasound  Paresthesia pain: none  Slow fractionated injection: yes  Hemodynamics: stable  Real-time US image taken/store: yes  Outcomes: uncomplicated and patient tolerated procedure well    Additional Notes  U/S 81358. Time out performed.          Medications Administered  ropivacaine (NAROPIN) injection 0.5% - Perineural   30 mL - 1/6/2023 12:36:00 PM

## 2023-01-06 NOTE — BRIEF OP NOTE
Brief Postoperative Note      Patient: Daniel Zheng  YOB: 1990  MRN: [id number]    Date of Procedure: 1/6/2023    Pre-Op Diagnosis: Closed right bimalleolar ankle fracture    Post-Op Diagnosis: Same       Procedure(s):  Open reduction internal fixation right bimalleolar ankle fracture    Surgeon(s):  Francisco Otero DO    Assistant:  Resident: Lloyd Ward DO; Carlos Rodriguez DO    Anesthesia: General    Estimated Blood Loss (mL): Minimal    Complications: None    Specimens:   * No specimens in log *    Implants:  Implant Name Type Inv.  Item Serial No.  Lot No. LRB No. Used Action   SCREW BNE L20MM DIA2.7MM DAVID S STL ST FULL THRD FOR  - [id number]  SCREW BNE L20MM DIA2.7MM DAVID S STL ST FULL THRD FOR   [employer] USA-WD  Right 1 Implanted   SCREW BNE L14MM DIA3.5MM DAVID S STL ST JULIETTE FULL THRD - [id number]  SCREW BNE L14MM DIA3.5MM DAVID S STL ST JULIETTE FULL THRD  DEPUY [employer] USA-WD  Right 1 Implanted   SCREW BNE L12MM DIA3.5MM DAVID S STL ST NONCANNULATED JULIETTE - [id number]  SCREW BNE L12MM DIA3.5MM DAVID S STL ST NONCANNULATED JULIETTE  DEPUY [employer] USA-WD  Right 2 Implanted   SCREW BNE L14MM DIA3.5MM DAVID S STL ST NONCANNULATED JULIETTE - [id number]  SCREW BNE L14MM DIA3.5MM DAVID S STL ST NONCANNULATED JULIETTE  DEPUY [employer] USA-WD  Right 1 Implanted   SCREW BNE L20MM DIA3.5MM DAVID S STL ST NONCANNULATED JULIETTE - [id number]  SCREW BNE L20MM DIA3.5MM DAVID S STL ST NONCANNULATED JULIETTE  DEPUY SYNTHES USA-WD  Right 1 Implanted   SCREW BNE L65MM DIA3.5MM DAVID S STL ST FULL THRD HEX RECESS - [id number]  SCREW BNE L65MM DIA3.5MM DAVID S STL ST FULL THRD HEX RECESS  DEPUY [employer] USA-WD  Right 1 Implanted   PLATE BNE [id number] 7 H S STL 1/3 TBLR JULIETTE COMPR W/ CLLR FOR - [id number]  PLATE BNE [id number] 7 H S STL 1/3 TBLR JULIETTE COMPR W/ CLLR FOR  DEPL[employer]aid SYNTHES USA-WD  Right 1 Implanted         Drains: * No LDAs found *    Findings: Open reduction internal fixation right bimalleolar ankle fracture    Electronically signed by Ying Tavera DO on 1/6/2023 at 3:03 PM

## 2023-01-06 NOTE — H&P
Department of Orthopedic Surgery  Attending H&PNote            HISTORY OF PRESENT ILLNESS:                The patient is a 29 y.o. female who presents with this is a 81-year-old female who was seen in my office this week for right bimalleolar ankle fracture. She got injured on [place] and was reduced in the emergency department the resident. Risk-benefit alternatives surgery discussed in my office he wished to proceed with open reduction internal fixation versus application of external fixator depending on her swelling. All her questions answered in detail. Her medical history is unremarkable except for drug abuse and current methadone use. Past Medical History:        Diagnosis Date    Abnormal Pap smear of cervix     Anemia     Drug abuse ([facility] [address]     Obesity     Overdose     multiple    Preeclampsia complicating hypertension 4/23/2021       Past Surgical History:    History reviewed. No pertinent surgical history. Current Medications:   Current Facility-Administered Medications: 0.9 % sodium chloride infusion, , IntraVENous, Continuous  sodium chloride flush 0.9 % injection 5-40 mL, 5-40 mL, IntraVENous, 2 times per day  sodium chloride flush 0.9 % injection 5-40 mL, 5-40 mL, IntraVENous, PRN  0.9 % sodium chloride infusion, , IntraVENous, PRN  ceFAZolin (ANCEF) 2,000 mg in sterile water 20 mL IV syringe, 2,000 mg, IntraVENous, On Call to OR    Allergies:  Patient has no known allergies. Social History:   TOBACCO:   reports that she has been smoking cigarettes. She has a 6.50 pack-year smoking history. She has never used smokeless tobacco.  ETOH:   reports no history of alcohol use. DRUGS:   reports that she does not currently use drugs after having used the following drugs: Methamphetamines (Crystal Meth). Family History:   History reviewed. No pertinent family history.     REVIEW OF SYSTEMS:       Constitutional:  Negative for weight loss, fevers, chills, fatigue  Cardiovascular: Negative for chest pain, palpitations  Pulmonary: Negative for shortness of breath, labored breathing, cough  GI: negative for abdominal pain, nausea, vomitting   MSK: per HPI  Skin: negative for rash, open wounds    All other systems reviewed and are negative         PHYSICAL EXAM:      Vitals:    01/04/23 1146   Weight: 209 lb (94.8 kg)   Height: 5' 4\" (1.626 m)       Height: [unfilled]  Weight: [unfilled]  BMI:  Body mass index is 35.87 kg/m². General: The patient is alert and oriented x 3, appears to be stated age and in no distress. HEENT: head is normocephalic, atraumatic. EOMI. Neck: supple, trachea midline, no thyromegaly   Cardiovascular: peripheral pulses palpable. Normal Capillary refill   Respiratory: breathing unlabored, chest expansion symmetric   GI: abdomen NT/ND. No masses   Skin: no rash, no open wounds, no erythema  Psych: normal affect; mood stable  MSK:  Splint left intact about the right lower extremity due to close reduction performed prior. Her toes are visible with appropriate color. Sensation is intact to the exposed aspects of the toes. No signs of deformity noted proximal to the splint.         DATA:      CBC:   Lab Results   Component Value Date/Time    WBC 10.8 06/12/2022 05:10 AM    RBC 4.47 06/12/2022 05:10 AM    HGB 14.1 06/12/2022 05:10 AM    HCT 41.3 06/12/2022 05:10 AM    MCV 92.4 06/12/2022 05:10 AM    MCH 31.5 06/12/2022 05:10 AM    MCHC 34.1 06/12/2022 05:10 AM    RDW 12.6 06/12/2022 05:10 AM     06/12/2022 05:10 AM    MPV 10.3 06/12/2022 05:10 AM     BMP:    Lab Results   Component Value Date/Time     06/12/2022 05:10 AM    K 3.8 06/12/2022 05:10 AM     06/12/2022 05:10 AM    CO2 19 06/12/2022 05:10 AM    BUN 10 06/12/2022 05:10 AM    LABALBU 4.0 06/09/2022 11:01 PM    CREATININE 0.7 06/12/2022 05:10 AM    CALCIUM 10.0 06/12/2022 05:10 AM    GFRAA >60 06/12/2022 05:10 AM    LABGLOM >60 06/12/2022 05:10 AM    GLUCOSE 137 06/12/2022 05:10 AM     PT/INR: Lab Results   Component Value Date/Time    PROTIME 12.9 11/23/2019 03:35 AM    INR 1.1 11/23/2019 03:35 AM     PTT:    Lab Results   Component Value Date/Time    APTT 34.8 11/23/2019 03:35 AM   [APTT}  U/A:    Lab Results   Component Value Date/Time    COLORU Yellow 05/04/2022 05:30 PM    PHUR 6.0 05/04/2022 05:30 PM    WBCUA 2-5 05/04/2022 05:30 PM    RBCUA 0-1 05/04/2022 05:30 PM    RBCUA >20 09/17/2012 05:45 PM    MUCUS Present 09/15/2016 05:14 PM    BACTERIA MODERATE 05/04/2022 05:30 PM    CLARITYU Clear 05/04/2022 05:30 PM    SPECGRAV 1.020 05/04/2022 05:30 PM    LEUKOCYTESUR SMALL 05/04/2022 05:30 PM    UROBILINOGEN 1.0 05/04/2022 05:30 PM    BILIRUBINUR Negative 05/04/2022 05:30 PM    BLOODU TRACE-LYSED 05/04/2022 05:30 PM    GLUCOSEU Negative 05/04/2022 05:30 PM       Radiology Review:  XR: Xray of the right ankle shows bimalleolar ankle fracture including displaced transverse medial malleolus fracture as well as short oblique distal fibula fracture with lateral translation. Slight medial clear space widening. Interval x-rays performed on 12/26/2022 demonstrate close reduction with more appropriate alignment at the ankle joint. Repeat imaging obtained in office today demonstrates bimalleolar ankle fracture in appropriate alignment with overlying splint material.        IMPRESSION/RECOMMENDATIONS:    29y.o. year old female with right bimalleolar ankle fracture  -Patient here today for open reduction internal fixation versus application of external fixator of right ankle. Risk-benefit alternative surgery discussed in detail with the patient: Blood loss infection blood clot nonunion revision surgery amongst other complications. She understands and wishes to proceed. All of her questions answered in detail. Informed consent was signed and obtained and placed in the chart. My initials were placed on her right leg.   She will discharge home later today with DVT prophylaxis pain control    Jesus Barbosa DO  Orthopaedic Surgery   1/6/23  9:27 AM

## 2023-01-10 DIAGNOSIS — S82.841A ANKLE FRACTURE, BIMALLEOLAR, CLOSED, RIGHT, INITIAL ENCOUNTER: ICD-10-CM

## 2023-01-10 RX ORDER — OXYCODONE AND ACETAMINOPHEN 7.5; 325 MG/1; MG/1
1 TABLET ORAL EVERY 6 HOURS PRN
Qty: 28 TABLET | Refills: 0 | OUTPATIENT
Start: 2023-01-10 | End: 2023-01-17

## 2023-01-10 NOTE — TELEPHONE ENCOUNTER
Patient called requesting refill of Percocet 7.5 - 325 mg. (ORIF Rt ankle bimalleolar fx 1/6/2023)  Patient has been taking every 4-6 hrs prn instead of q 6 hrs as prescribed. States Dr Dharmesh Conteh informed she may need to take 1 1/2 pill instead of 1. Informed her refill not due until 1/13/2022. Pharmacy may not refill. Will send message to Dr Dharmesh Conteh for his review. Patient also states Percocet and Robaxin not really working. Refill pended.

## 2023-01-13 DIAGNOSIS — S82.841A ANKLE FRACTURE, BIMALLEOLAR, CLOSED, RIGHT, INITIAL ENCOUNTER: ICD-10-CM

## 2023-01-13 RX ORDER — OXYCODONE AND ACETAMINOPHEN 7.5; 325 MG/1; MG/1
1 TABLET ORAL EVERY 6 HOURS PRN
Qty: 28 TABLET | Refills: 0 | Status: SHIPPED
Start: 2023-01-13 | End: 2023-01-20 | Stop reason: SDUPTHER

## 2023-01-18 ENCOUNTER — OFFICE VISIT (OUTPATIENT)
Dept: ORTHOPEDIC SURGERY | Age: 33
End: 2023-01-18

## 2023-01-18 VITALS — HEIGHT: 64 IN | BODY MASS INDEX: 35.68 KG/M2 | WEIGHT: 209 LBS

## 2023-01-18 DIAGNOSIS — S82.841A CLOSED BIMALLEOLAR FRACTURE OF RIGHT ANKLE, INITIAL ENCOUNTER: Primary | ICD-10-CM

## 2023-01-18 NOTE — PROGRESS NOTES
Follow Up Post Operative Visit     Surgery: Right ankle open reduction internal fixation  Date: 1/6/2023    Subjective:    Lior Paredes is here for follow up visit s/p above procedure. She is doing well. She has been somewhat compliant with weightbearing. She states that she has been putting some pressure on her foot. Incisions of healed appropriately. She having some pain control issues which we expected. Denies fever and chills. Denies numbness tingling. Controlled Substances Monitoring:        Physical Exam:    Height: 5' 4\" (1.626 m), Weight: 209 lb (94.8 kg)    General: Alert and oriented x3, no acute distress  Cardiovascular/pulmonary: No labored breathing, peripheral perfusion intact  Musculoskeletal:    Right ankle: Sutures taken out. Incisions well approximated no signs of infection. Moderate swelling. Sensation intact sural saphenous superficial peroneal deep peroneal tibial nerve distribution. Intact ankle plantar and dorsiflexion. Foot is warm well perfused      Imaging: 3 views of the right ankle reviewed by myself independently discussed the patient. Status post open reduction internal fixation with anatomic reduction and appropriate hardware placement. No evidence of failure    Assessment and Plan: 2-week status post right ankle open reduction internal fixation  -Plan discussed detail with the patient. Continue aspirin for DVT prophylaxis. Pain control when she needs refill for me for 6 weeks. She will be nonweightbearing for 6 weeks. We will see her back in 1 month and repeat x-rays. At that point we will go let her start bearing weight in a cam boot. She can remove her cam boot for range of motion. All her questions answered in detail. She is happy with this plan.     Elisa Reed, DO   Orthopaedic Surgery   1/18/23   1:21 PM EST
Sutures removed from right ankle. A walking boot was applied to the right lower leg. Use and care instructions were reviewed with patient.      Brace supplied by and billed for by [facility]
Name band;

## 2023-01-20 ENCOUNTER — TELEPHONE (OUTPATIENT)
Dept: ORTHOPEDIC SURGERY | Age: 33
End: 2023-01-20

## 2023-01-20 DIAGNOSIS — S82.841A ANKLE FRACTURE, BIMALLEOLAR, CLOSED, RIGHT, INITIAL ENCOUNTER: Primary | ICD-10-CM

## 2023-01-20 RX ORDER — OXYCODONE AND ACETAMINOPHEN 7.5; 325 MG/1; MG/1
1 TABLET ORAL EVERY 6 HOURS PRN
Qty: 28 TABLET | Refills: 0 | Status: SHIPPED
Start: 2023-01-20 | End: 2023-01-27 | Stop reason: SDUPTHER

## 2023-01-20 NOTE — TELEPHONE ENCOUNTER
1/20/2023 11:12 am Patient phoned the office. Requesting a refill - Percocet.   Pharmacy - [facility], [place]    Order pended

## 2023-01-27 ENCOUNTER — TELEPHONE (OUTPATIENT)
Dept: ORTHOPEDIC SURGERY | Age: 33
End: 2023-01-27

## 2023-01-27 DIAGNOSIS — S82.841A ANKLE FRACTURE, BIMALLEOLAR, CLOSED, RIGHT, INITIAL ENCOUNTER: ICD-10-CM

## 2023-01-27 RX ORDER — OXYCODONE AND ACETAMINOPHEN 7.5; 325 MG/1; MG/1
1 TABLET ORAL EVERY 6 HOURS PRN
Qty: 28 TABLET | Refills: 0 | Status: SHIPPED
Start: 2023-01-27 | End: 2023-01-27 | Stop reason: SDUPTHER

## 2023-01-27 RX ORDER — OXYCODONE AND ACETAMINOPHEN 7.5; 325 MG/1; MG/1
1 TABLET ORAL EVERY 6 HOURS PRN
Qty: 28 TABLET | Refills: 0 | Status: SHIPPED
Start: 2023-01-27 | End: 2023-02-03 | Stop reason: SDUPTHER

## 2023-01-27 RX ORDER — OXYCODONE AND ACETAMINOPHEN 7.5; 325 MG/1; MG/1
1 TABLET ORAL EVERY 6 HOURS PRN
Qty: 28 TABLET | Refills: 0 | Status: SHIPPED
Start: 2023-01-27 | End: 2023-01-27 | Stop reason: CLARIF

## 2023-01-27 NOTE — TELEPHONE ENCOUNTER
Patient's pharmacy is out of [facility]. Rx will need sent to alternate pharmacy. [facility] cannot transfer narcotic Rx's between pharmacies.  Re-pended Rx to be emailed to [id number] on [employer]

## 2023-01-27 NOTE — TELEPHONE ENCOUNTER
Patient's pharmacy is out of [facility]. Rx will need sent to alternate pharmacy. [facility] cannot transfer narcotic Rx's between pharmacies. Re-pended Rx to be emailed to [employer] on "[employer].

## 2023-02-03 DIAGNOSIS — S82.841A ANKLE FRACTURE, BIMALLEOLAR, CLOSED, RIGHT, INITIAL ENCOUNTER: Primary | ICD-10-CM

## 2023-02-03 RX ORDER — OXYCODONE AND ACETAMINOPHEN 7.5; 325 MG/1; MG/1
1 TABLET ORAL EVERY 6 HOURS PRN
Qty: 28 TABLET | Refills: 0 | Status: SHIPPED | OUTPATIENT
Start: 2023-02-03 | End: 2023-02-10

## 2023-02-03 NOTE — TELEPHONE ENCOUNTER
2/03/2023 2:15 pm Patient phoned the office / Message left on voice mail: I know this is my last prescription. It is my 6 th week.  I am calling for a refill - [phone]    Order pended

## 2023-02-03 NOTE — TELEPHONE ENCOUNTER
Spoke with [employer]. Patient has had 5 opioid Rx's in 30 days. Unable to refill without authorization. Spoke with patient per Dr Satnam Garcia no more refills of Percocet. She is to take OTC pain meds Tylenol / Ibuprofen as directed on labels.      Patient verbalized understanding of instructions by answering \"OK\"

## 2023-02-07 NOTE — TELEPHONE ENCOUNTER
Spoke with Roxy @ [employer] to ensure that Percocet Rx from Friday 2/3/2023 was cancelled. Patient instructed on OTC pain medications.

## 2023-02-13 ENCOUNTER — TELEPHONE (OUTPATIENT)
Dept: ORTHOPEDIC SURGERY | Age: 33
End: 2023-02-13

## 2023-02-13 NOTE — TELEPHONE ENCOUNTER
Received fax from [facility] stating after several attempts, unable to reach patient regarding obtaining shower chair or walker.

## 2023-02-21 ENCOUNTER — TELEPHONE (OUTPATIENT)
Dept: ORTHOPEDIC SURGERY | Age: 33
End: 2023-02-21

## 2023-02-21 NOTE — LETTER
February 27, 2023       Kayy Shook      Dear Jessica Bryant: We have been trying to reach you by phone regarding a missed appointment with Dr Qing Reese on 2/15/2023    Please call the office for follow-up appointment.       Sincerely,        Dasha Kang, DO

## 2023-02-27 NOTE — TELEPHONE ENCOUNTER
2/27/2023  Letter mailed to patient regarding need for FU appt and instructions to call office for follow-up appointment.

## 2023-04-15 NOTE — ED NOTES
Department of Emergency Medicine  FIRST PROVIDER TRIAGE NOTE             Independent MLP           6/9/22  10:54 PM EDT    Date of Encounter: 6/9/22   MRN: [id number]      HPI: Lior Paredes is a 36 y.o. female who presents to the ED for Chest Pain (tightness, since 0600 this am, sob)    Pt states she was just recently admitted for bradycardia. Began having left sided CP and tightness upon waking this morning     ROS: Negative for fever or cough. PE: Gen Appearance/Constitutional: alert  Musculoskeletal: moves all extremities x 4     Initial Plan of Care: All treatment areas with department are currently occupied. Plan to order/Initiate the following while awaiting opening in ED: labs, EKG and imaging studies.   Initiate Treatment-Testing, Proceed toTreatment Area When Bed Available for ED Attending/MIKE to Continue Care    Electronically signed by James Baer PA-C   DD: 6/9/22         James Baer PA-C  06/09/22 1949 Patient

## 2023-07-18 NOTE — PROGRESS NOTES
[facility]  Internal Medicine Residency / [address]    Attending Physician Statement  I have discussed the case, including pertinent history and exam findings with the resident and the team.  I have seen and examined the patient and the key elements of the encounter have been performed by me. I agree with the assessment, plan and orders as documented by the resident. Case Discussed During AM Rounds   Covering for Dr. Walter Rucker and House Team 2 today    Continues to complain of generalized nausea without emesis   Still no bowel movement   Hypoactive bowel sounds on exam with generalized tenderness- no noted peritoneal signs on exam    No significant distention    Vital signs remain stable with resolution of significant sinus bradycardia; BP stable; no additional complaints- patient desires DC to home when able     Bradycardia- Sinus   EP following   Clonidine wean protocol initiated and will continue to hold off any further clonidine use    Methadone continued and dosing previously confirmed with [facility]    Patient has been following with [facility] both for methadone use and PCP- discussed need to follow closely      Abdominal Pain likely secondary to chronic Constipation    Prior KUB- significant stool burden   Still no BM    Trial of prns without success   Remains with hypoactive bowel sounds- notes + flatus; no acute abdomen findings on exam   Repeat KUB- if still concerned stool burden without significant new findings- trial of lactulose discussed with pharmacy team    Patient may benefit from opiate antagonist management with [facility] as outpatient- would recommend discussion with PCP as patient has irregular bowel habits as outpatient and chronic methadone use    Follow results and residents to follow today     Hx of Polysubstance Abuse    Management as above       Remainder of medical problems as per resident note.       Alcides Guaman MD, [address]   Internal Physical Therapy Visit    Visit Type: Daily Treatment Note  Visit: 10  Referring Provider: Low Duval DO  Medical Diagnosis (from order): Diagnosis Information    Diagnosis  840.4 (ICD-9-CM) - S46.011A (ICD-10-CM) - Traumatic incomplete tear of right rotator cuff, initial encounter  726.0 (ICD-9-CM) - M75.01 (ICD-10-CM) - Adhesive capsulitis of right shoulder         SUBJECTIVE                                                                                                               Patient reports having increased right sided neck pain and pain into right shoulder and upper arm at right triceps. She states pain is a shooting pain. She states pain was aggravated over the weekend when cleaning her car mats. She states she cleaned mats for 20 minutes and has had increased pain ever since. She states having numbness into fingers of her right hand. She reports feeling good during last physical therapy session.       OBJECTIVE                                                                                                                                       Treatment    Un-attended Electrical Stimulation (70559/): Interferential Current  - Purpose: pain relief  - Location: right shoulder  - Pulse Rate:  Hz  - Intensity: patient tolerance  - In conjunction with: cold pack  - Duration: 10 minutes    Reaction: no adverse reaction to treatment      Therapeutic Exercise  Passive right shoulder range of motion: flexion, internal rotation, external rotation   Radial neve glides 15 repetitions    Radial nerve tensioner at wrist  20 repetitions   New:  Wall slide into abduction 2x10        Manual Therapy   soft tissue mobilization to right shoulder girdle. AP glenohumeral joint mobilization 2 x 30\" grade 3  Distraction 2 x 30\"      Neuromuscular Re-Education  Neuromuscular Re-education to improve quality of motion , improve posture and postural awareness, improve proprioception, improve coordination , and  Medicine Residency Faculty improve kinesthetic sense, or required tactile cueing:    Seated external and internal rotation elbow supported at 90 degrees abduction. 2 x 10 1# weight -deferred this session   Push/pull box at chest/waist height - keep elbows in -- 15# 20 repetitions -deferred this session   Seated scapular depression- push hands into table and lift up body weight -- scapular winging noted and pain. Cues for posture decreased pain 15 repetitions -deferred this session   Overhead taps (timed endurance) 3 x 20\" at wall with ball -deferred this session   upper body ergometer 4 minutes  Shoulder External rotation at speed pulley (single) 5# 2x10  Shoulder Internal Rotation at speed pulley (single) 10# 2x15  New  Serratus wall slide with peach band 2x10  Landmine press -double arm with dowel dax on 42 inch box 5# x20  Box hand taps - 32\" box. 10 repetitions bilateral   Speedpulley double arm reverse chop (single pulley) 10# x15 bilateral   Prone swiss ball x 20 repetitions each   - extensions x15  - horizontal abduction x8    Next patient visit:    - Prone \"Y\"s    Short foam roll carry at 90 degrees flexion - lateral border sticking out, cues needed  High row 10#  2 x 10 repetitions   2.5# plate stacks overhead to level 4 shelf    Skilled input: verbal instruction/cues, tactile instruction/cues, posture correction and demonstration    Writer verbally educated and received verbal consent for hand placement, positioning of patient, and techniques to be performed today from patient for therapist position for techniques and hand placement and palpation for techniques as described above and how they are pertinent to the patient's plan of care.  Home Exercise Program  Access Code: [email or web address]  URL: [email or web address]  Date: 05/31/2023  Prepared by: Nick Haynes    Exercises  - Seated Upper Trapezius Stretch  - 2-3 x daily - 7 x weekly - 3 sets - 1 reps - 20 seconds hold  - Seated Shoulder External Rotation JORDANA  with Cane and Hand in Neutral  - 2 x daily - 7 x weekly - 1 sets - 15 reps  - Seated Shoulder Scaption Slide at Table Top with Forearm in Neutral  - 2 x daily - 7 x weekly - 1 sets - 15 reps  - Seated Scapular Retraction  - 2-3 x daily - 7 x weekly - 2 sets - 10 reps  - Standing Shoulder Row with Anchored Resistance  - 1 x daily - 3-5 x weekly - 3 sets - 10 reps  - Shoulder extension with resistance - Neutral  - 1 x daily - 3-5 x weekly - 3 sets - 10 reps  - Standing Shoulder Flexion AAROM with Dowel  - 1-2 x daily - 7 x weekly - 2 sets - 10 reps  - Standing Shoulder Abduction ROM with Dowel  - 1-2 x daily - 7 x weekly - 2 sets - 10 reps  - Prone Shoulder Extension - Single Arm with Dumbbell  - 1 x daily - 3-4 x weekly - 2 sets - 10 reps  - Prone Single Arm Shoulder Horizontal Abduction with Scapular Retraction and Palm Down  - 1 x daily - 3-4 x weekly - 2 sets - 10 reps  - Shoulder Taps on Table  - 1 x daily - 3-4 x weekly - 2 sets - 10 reps  - Shoulder Circles on Wall with Towel  - 1 x daily - 3-4 x weekly - 2 sets - 10-15 reps  - Shoulder Flexion Wall Slide with Towel  - 2 x daily - 7 x weekly - 1 sets - 15 reps  - Standing Shoulder Abduction Slides at Wall  - 2 x daily - 7 x weekly - 1 sets - 15 reps  - Supine Serratus Punches Resistance  - 1 x daily - 3-4 x weekly - 2 sets - 10 reps  - Supine Shoulder Flexion Extension Full Range AROM  - 1 x daily - 7 x weekly - 1 sets - 10-20 reps  - Shoulder External Rotation with Anchored Resistance  - 1 x daily - 3-4 x weekly - 3 sets - 10 reps  - Shoulder Internal Rotation with Resistance  - 1 x daily - 3-4 x weekly - 3 sets - 10 reps  - Standing Bicep Curls Supinated with Dumbbells  - 1 x daily - 2-3 x weekly - 3 sets - 10 reps  - Dynamic Hug with Resistance  - 1 x daily - 3 x weekly - 2 sets - 10 reps        ASSESSMENT                                                                                                            Patient presented with elevated pain symptoms  at right side of neck into right shoulder and right proximal upper extremity. Some exercises were deferred this session to avoid aggravation of pain symptoms. Patient required cueing for cervical positioning to avoid forward head posture during right shoulder range of motion and stability exercises. Continued with wall slide into shoulder abduction to improve right shoulder range of motion for reaching out to the side. Cueing during wall slides into abduction and serratus anterior wall slides with theraband to avoid over activation of upper trap muscle. Scapular winging still demonstrated during serratus anterior slides with theraband but less compared to prior visits. Cueing still needed during hand taps at high box to improve serratus anterior stability to reduce right scapular winging. Patient continues to present with right shoulder weakness into External rotation and at right scapula during prone scapular stability exercise. Modalities performed at end of session to manage pain symptoms. Patient will continue to benefit from scapular stability and shoulder strengthening as tolerated to improve shoulder stabilization with reaching, lifting and carrying activities.      Education:   - Results of above outlined education: Verbalizes understanding and Demonstrates understanding    PLAN                                                                                                                           Suggestions for next session as indicated: Continue plan of care to address restrictions identified in initial evaluation contributing to functional deficits including PT POC: activities of daily living, biomechanical training, home program, manual therapy, neuromuscular re-education, therapeutic activities and therapeutic exercise.          Therapy procedure time and total treatment time can be found documented on the Time Entry flowsheet

## 2023-11-13 NOTE — ED NOTES
11/13/2023      RE: Kamryn Miller  [address]  [address]  [place] [address]       Dear Colleague,    Thank you for the opportunity to participate in the care of your patient, Kamryn Miller, at the [facility] [place] at [facility]. Please see a copy of my visit note below.      [facility] HEART CARE  CARDIOVASCULAR DIVISION    VALVE CLINIC RETURN VISIT    PRIMARY CARDIOLOGIST: Dr. Zuniga      PERTINENT CLINICAL HISTORY:     Kamryn Miller is a very pleasant 88 year old female who presents for 1 year TAVR follow-up. She has a history of HFpEF now HFrEF, HTN, HLD, significant CAD history (PTCA 1996, LAD and RCA stenting 2003, s/p LAD PCI 2020, recent cath 10/2022 with patent stents), CKD, chronic anemia and severe aortic valvular stenosis s/p (TAVR) with a 26mm Arndt Janis Ultra on 9/12/22 by Morro Burrows. Her post TAVR echo showed mean PG of 7 mmHg with trace PVL. She was noted to have 60% left femoral stenosis post procedure, but had adequate flow on peripheral angiogram. She was also noted to have new LBBB post procedure and discharged on a Cardionet which showed no high degree AVB.     Evaluated 1 month post TAVR. At the visit she reported worsening shortness of breath and angina. ECHO showed mean PG 13 mmHg with trace PVL. Underwent coronary angiogram 10/2022 which showed patent stents.     Since that time BP has been difficult to treat. She has intolerances to beta blockers, amlodipine and hydralazine. She has been admitted a few times with severe hypertension and volume overload. Now following for CORE clinic, BP and volume status well controlled.     Was noted to have elevated mean PG up 18 mmHg on 6 month TAVR echo. CT TAVR showed severe HALT. She was started on AC.     Her EF has been slowly declining since her TAVR. Was 55-60% pre TAVR, dropped to 40-45%, now 30-35% despite GDMT.     Had recent TED with creatinine up to  Patient given discharge instructions by provider Krista Booker LPN  81/55/71 6470 2.5 and hyperkalemia in the setting of COVID. Sheridan was discontinued, lasix and entresto held with improvement in renal function.     Today Ms. Miller is seen in clinic with her daughter. She reports feeling quite well. She denies chest tightness/discomfort, SOB, GREGORY, palpitations, lightheadedness, dizziness, syncope, or near syncope. Denies orthopnea, PND, peripheral edema.  She lives in assisted living. She is active and participates in exercise classes. Review of daily BP log shows BPs ranging 102-138 systolic. Weights are stable 144.6 lb. Patient states she has been eating and drinking well.      Current cardiac medications: ASA 81 mg daily, Enstresto 49-51 mg BID, Jardiance 10 mg, lasix 30 mg daily, amlodipine 2.5 mg daily, Imdur 60 mg, Eliquis 5 mg BID.      PAST MEDICAL HISTORY:     Past Medical History:   Diagnosis Date    Aortic valvar stenosis 7/2010    mild    Asthma     Bipolar disorder (H)     CAD (coronary artery disease)     s/p angioplasty    Celiac disease     Colon polyps     Colon polyps 2012    every 3 year colonoscopy     Depression     High cholesterol     HTN     Mitral insufficiency     Pulmonary HTN (H)     mild    Tremors 7/10    drug induced from antidepressants    Tricuspid insufficiency         PAST SURGICAL HISTORY:     Past Surgical History:   Procedure Laterality Date    ANGIOGRAM  6/26/2009    ANGIOPLASTY  9/96    for angina    ANGIOPLASTY  8/03 - 9/03    X -2 - with stenst in coronary car.    APPENDECTOMY      BREAST BIOPSY, RT/LT      Breat Biopsy RT/LT, benign    BUNIONECTOMY  11/8/2011    Procedure:BUNIONECTOMY; Left donna bunionectomy; Surgeon:FREDY LITTLEJOHN; Location:[facility]    BUNIONECTOMY RT/LT  5/2007    right bunion and right 2nd hammertoe    CATARACT IOL, RT/LT  6/12 and 7/12    bilateral    COLONOSCOPY  2007, 2012    every 3 years for polyps    CV CORONARY ANGIOGRAM N/A 8/21/2020    Procedure: CV CORONARY ANGIOGRAM;  Surgeon: Gianluca Toscano MD;  Location:   HEART CARDIAC CATH LAB    CV CORONARY ANGIOGRAM N/A 10/25/2022    Procedure: Coronary Angiogram;  Surgeon: Carter Douglass MD;  Location:  HEART CARDIAC CATH LAB    CV INSTANTANEOUS WAVE-FREE RATIO N/A 10/25/2022    Procedure: Instantaneous Wave-Free Ratio;  Surgeon: Carter Douglass MD;  Location:  HEART CARDIAC CATH LAB    CV LEFT HEART CATH N/A 8/21/2020    Procedure: CV LEFT HEART CATH;  Surgeon: Gianluca Toscano MD;  Location:  HEART CARDIAC CATH LAB    CV LEFT HEART CATH N/A 11/3/2020    Procedure: CV LEFT HEART CATH;  Surgeon: Tom Burrows MD;  Location:  HEART CARDIAC CATH LAB    CV LOWER EXTREMITY ANGIOGRAM BILATERAL N/A 11/3/2020    Procedure: CV ANGIOGRAM LOWER EXTREMITY BILATERAL;  Surgeon: Tom Burrows MD;  Location:  HEART CARDIAC CATH LAB    CV PCI STENT DRUG ELUTING N/A 8/21/2020    Procedure: Percutaneous Coronary Intervention Stent Drug Eluting;  Surgeon: Gianluca Toscano MD;  Location:  HEART CARDIAC CATH LAB    CV RIGHT HEART CATH MEASUREMENTS RECORDED N/A 8/21/2020    Procedure: CV RIGHT HEART CATH;  Surgeon: Gianluca Toscano MD;  Location:  HEART CARDIAC CATH LAB    CV RIGHT HEART CATH MEASUREMENTS RECORDED N/A 11/3/2020    Procedure: CV RIGHT HEART CATH;  Surgeon: Tom Burrows MD;  Location:  HEART CARDIAC CATH LAB    CV TRANSCATHETER AORTIC VALVE REPLACEMENT N/A 9/12/2022    Procedure: Transfemoral Transcatheter Aortic Valve Replacement with possible open heart bypass and or balloon pump placement and any indicated procedure;  Surgeon: Tom Burrows MD;  Location:  HEART CARDIAC CATH LAB    HEART CATH FEMORAL CANNULIZATION WITH OPEN STANDBY REPAIR AORTIC VALVE N/A 9/12/2022    Procedure: OR TRANSCATHETER AORTIC VALVE REPLACEMENT, OPEN FEMORAL ARTERY APPROACH;  Surgeon: Arthur Markham MD;  Location:  HEART CARDIAC CATH LAB    JOINT REPLACEMENT, HIP RT/LT  4/2008    right hip replaced    JOINT  REPLACEMENT, HIP RT/LT  4/2009    left hip replaced    REPAIR HAMMER TOE  11/8/2011    Procedure:REPAIR HAMMER TOE; left 2nd hammertoe repair; Surgeon:FREDY LITTLEJOHN; Location:[facility]    SURGICAL HISTORY OF -   11/11    left bunion and 2nd hammertoe repair    TUBAL LIGATION      ZZC ANESTH,BLEPHAROPLASTY      (R) for drooping eyelid    ZZC APPENDECTOMY          CURRENT MEDICATIONS:     Current Outpatient Medications   Medication Sig Dispense Refill    acetaminophen (TYLENOL) 500 MG tablet Take 1 tablet (500 mg) by mouth daily Continue previously ordered PRN tylenol order as well 30 tablet 11    acetaminophen (TYLENOL) 500 MG tablet Take 1 tablet (500 mg) by mouth every 6 hours as needed for pain 60 tablet 4    albuterol (PROAIR HFA/PROVENTIL HFA/VENTOLIN HFA) 108 (90 Base) MCG/ACT inhaler Inhale 2 puffs into the lungs every 6 hours as needed for wheezing or shortness of breath / dyspnea 18 g 3    amLODIPine (NORVASC) 2.5 MG tablet Take 1 tablet (2.5 mg) by mouth At Bedtime 90 tablet 3    amoxicillin (AMOXIL) 500 MG capsule       apixaban ANTICOAGULANT (ELIQUIS) 5 MG tablet Take 1 tablet (5 mg) by mouth 2 times daily 60 tablet 3    aspirin (ASA) 81 MG EC tablet Take 1 tablet (81 mg) by mouth daily 30 tablet 0    azelastine (ASTEPRO) 0.15 % nasal spray USE 1 SPRAY IN EACH NOSTRIL ONCE A DAY 30 mL 11    calcium citrate (CITRACAL) 950 (200 Ca) MG tablet TAKE 1 TABLET BY MOUTH ONCE DAILY 30 tablet 11    cholecalciferol (VITAMIN D3) 1250 mcg (85305 units) capsule TAKE 1 CAPSULE BY MOUTH ONCE WEEKLY 4 capsule 11    desvenlafaxine (PRISTIQ) 100 MG 24 hr tablet Take 100 mg by mouth daily      empagliflozin (JARDIANCE) 10 MG TABS tablet Take 1 tablet (10 mg) by mouth daily 90 tablet 3    fluticasone-salmeterol (ADVAIR DISKUS) 500-50 MCG/ACT inhaler INHALE 1 PUFF BY MOUTH TWICE DAILY (Patient taking differently: 1 puff daily INHALE 1 PUFF BY MOUTH TWICE DAILY) 60 each 0    folic acid (FOLVITE) 400 MCG tablet TAKE 1 TABLET BY  "MOUTH ONCE DAILY 28 tablet 11    furosemide (LASIX) 20 MG tablet Take 1.5 tablets (30 mg) by mouth daily 135 tablet 3    iron polysaccharides (NIFEREX) 150 MG capsule Take 1 capsule (150 mg) by mouth daily 30 capsule 1    isosorbide mononitrate (IMDUR) 30 MG 24 hr tablet Take 2 tablets (60 mg) by mouth daily 60 tablet 11    lamoTRIgine (LAMICTAL) 25 MG tablet Take 25 mg by mouth daily with 200 mg tablet for a total dose of 225 mg      LAMOTRIGINE 200 MG PO TABS Take 200 mg by mouth daily with 25 mg tablet for a total dose of 225 mg      levothyroxine (SYNTHROID/LEVOTHROID) 50 MCG tablet TAKE 1 TABLET BY MOUTH ONCE DAILY 28 tablet 11    liothyronine (CYTOMEL) 5 MCG tablet Take 2 tablets (10 mcg) by mouth daily 30 tablet 3    memantine (NAMENDA) 10 MG tablet Take 1 tablet (10 mg) by mouth daily 30 tablet 3    mirtazapine (REMERON) 7.5 MG tablet Take 1 tablet (7.5 mg) by mouth At Bedtime 30 tablet 3    Multiple Vitamin (TAB-A-JENNIFER) TABS Take 1 tablet by mouth daily 30 tablet PRN    psyllium (METAMUCIL/KONSYL) 58.6 % powder Take 1 tspn in liquid daily      REGULOID 28.3 % POWD MIX 1 TEASPOON IN LIQUID AND DRINK BY MOUTH ONCE DAILY (Patient not taking: Reported on 10/13/2023) 538 g 11    rosuvastatin (CRESTOR) 10 MG tablet Take 1 tablet (10 mg) by mouth daily 30 tablet 0    sacubitril-valsartan (ENTRESTO) 49-51 MG per tablet Take 1 tablet by mouth 2 times daily 60 tablet 3        ALLERGIES:     Allergies   Allergen Reactions    Ace Inhibitors Cough    Amlodipine      Headache and plugged ears    Carvedilol      \"plugged ears and a headache\"    Diclofenac Other (See Comments)     Balance problems    Gluten Meal Diarrhea    Hydralazine-Hctz      headache        FAMILY HISTORY:     Family History   Problem Relation Age of Onset    Asthma Mother     Cerebrovascular Disease Mother     Arthritis Mother     Depression Mother     Lipids Sister     Hypertension Sister     Heart Disease Sister     Lipids Sister     Hypertension " Sister     Lipids Sister     Breast Cancer Sister         SOCIAL HISTORY:     Social History     Socioeconomic History    Marital status:      Spouse name: Adrien    Number of children: 2    Years of education: 16    Highest education level: None   Occupational History     Employer: RETIRED     Comment: Retired in 2002.    Tobacco Use    Smoking status: Never Smoker    Smokeless tobacco: Never Used   Substance and Sexual Activity    Alcohol use: No     Alcohol/week: 0.0 standard drinks     Types: 1 Standard drinks or equivalent per week    Drug use: No    Sexual activity: Not Currently     Partners: Male        REVIEW OF SYSTEMS:     Constitutional: No fevers or chills  Skin: No new rash or itching  Eyes: No acute change in vision  Ears/Nose/Throat: No purulent rhinorrhea, new hearing loss, or new vertigo  Respiratory: No cough or hemoptysis  Cardiovascular: See HPI  Gastrointestinal: No change in appetite, vomiting, hematemesis or diarrhea  Genitourinary: No dysuria or hematuria  Musculoskeletal: No new back pain, neck pain or muscle pain  Neurologic: No new headaches, focal weakness or behavior changes  Psychiatric: No hallucinations, excessive alcohol consumption or illegal drug usage  Hematologic/Lymphatic/Immunologic: No bleeding, chills, fever, night sweats or weight loss  Endocrine: No new cold intolerance, heat intolerance, polyphagia, polydipsia or polyuria      PHYSICAL EXAMINATION:     /79 (BP Location: Right arm, Patient Position: Chair, Cuff Size: Adult Large)   Pulse 70   Wt 68.4 kg (150 lb 14.4 oz)   SpO2 96%   BMI 29.09 kg/m      GENERAL: No acute distress.  HEENT: EOMI. Sclerae white, not injected. Nares clear. Pharynx without erythema or exudate.   Neck: No adenopathy. No thyromegaly. No jugular venous distension.   Heart: Regular rate and rhythm. No murmur.   Lungs: Clear to auscultation. No ronchi, wheezes, rales.   Abdomen: Soft, nontender, nondistended. Bowel sounds  present.  Extremities: No clubbing, cyanosis, or edema.   Neurologic: Alert and oriented to person/place/time, normal speech and affect. No focal deficits.  Skin: No petechiae, purpura or rash.     LABORATORY DATA:     LIPID RESULTS:  Lab Results   Component Value Date    CHOL 155 10/24/2022    CHOL 175 10/23/2020    HDL 54 10/24/2022    HDL 71 10/23/2020    LDL 76 10/24/2022    LDL 83 10/23/2020    TRIG 124 10/24/2022    TRIG 103 10/23/2020    CHOLHDLRATIO 2.8 09/18/2015       LIVER ENZYME RESULTS:  Lab Results   Component Value Date    AST 36 (H) 02/26/2023    AST 27 05/17/2021    ALT 38 (H) 02/26/2023    ALT 38 05/17/2021       CBC RESULTS:  Lab Results   Component Value Date    WBC 9.4 06/23/2023    WBC 4.6 05/17/2021    RBC 3.92 06/23/2023    RBC 3.97 05/17/2021    HGB 12.3 06/23/2023    HGB 12.9 05/17/2021    HCT 39.4 06/23/2023    HCT 40.3 05/17/2021     (H) 06/23/2023     (H) 05/17/2021    MCH 31.4 06/23/2023    MCH 32.5 05/17/2021    MCHC 31.2 (L) 06/23/2023    MCHC 32.0 05/17/2021    RDW 15.8 (H) 06/23/2023    RDW 12.7 05/17/2021     06/23/2023     05/17/2021       BMP RESULTS:  Lab Results   Component Value Date     11/03/2023     06/04/2021    POTASSIUM 5.2 11/03/2023    POTASSIUM 4.6 10/05/2022    POTASSIUM 3.4 06/04/2021    CHLORIDE 100 11/03/2023    CHLORIDE 103 10/05/2022    CHLORIDE 106 06/04/2021    CO2 25 11/03/2023    CO2 27 10/05/2022    CO2 30 06/04/2021    ANIONGAP 12 11/03/2023    ANIONGAP 7 10/05/2022    ANIONGAP 6 06/04/2021    GLC 87 11/03/2023    GLC 85 01/31/2023    GLC 98 10/05/2022     (H) 06/04/2021    BUN 23.0 11/03/2023    BUN 39 (H) 10/05/2022    BUN 27 06/04/2021    CR 1.49 (H) 11/03/2023    CR 1.50 (H) 06/04/2021    GFRESTIMATED 34 (L) 11/03/2023    GFRESTIMATED 32 (L) 06/04/2021    GFRESTBLACK 37 (L) 06/04/2021    PRINCE 9.7 11/03/2023    PRINCE 9.4 06/04/2021        A1C RESULTS:  Lab Results   Component Value Date    A1C  02/15/2023       Comment:      The previously reported result may be inaccurate due to a laboratory instrument calibration issue. Providers should order a new test to be performed if clinically indicated.  [facility] will issue a credit for this test.  This is a corrected result. Previous result was 6.4 % on 2/15/2023 at  6:09 PM CST    A1C 5.0 06/30/2009       INR RESULTS:  Lab Results   Component Value Date    INR 1.12 02/22/2023    INR 1.21 (H) 11/30/2022    INR 1.05 11/03/2020    INR 2.28 (H) 04/23/2009          PROCEDURES & FURTHER ASSESSMENTS:   CT Heart:  HALT resolved    ECHO 11/13/23:  ______________________________________________________________________________  Interpretation Summary  Status post 26 mm Arndt Janis Ultra TAVR on 9/12/2022.  There is mild paravalvular regurgitation. There is trace valvular  regurgitation. The mean gradient across the aortic valve is 13 mmHg.  Severe mitral annular calcification is present.  Left ventricular function is decreased. The ejection fraction is 30-35%  (moderately reduced).  Global right ventricular function is normal.  IVC diameter <2.1 cm collapsing >50% with sniff suggests a normal RA pressure  of 3 mmHg.  No pericardial effusion is present.  No significant changes noted. The presence of severe MAC makes PVL assessment  challenging. PVL better assessed today but appears unchanged from prior.  ______________________________________________________________________________  Left Ventricle  Left ventricular size is normal. Borderline concentric wall thickening  consistent with left ventricular hypertrophy is present. Left ventricular  function is decreased. The ejection fraction is 30-35% (moderately reduced).  Moderate diffuse hypokinesis is present. Abnormal septal motion consistent  with left bundle branch block is present.     Right Ventricle  The right ventricle is normal size. Global right ventricular function is  normal.     Mitral Valve  Severe mitral  annular calcification is present. Trace mitral insufficiency is  present. The mean mitral valve gradient is 2.8 mmHg.     Aortic Valve  The mean gradient across the aortic valve is 13 mmHg. Transcutaneous aortic  valve replacement is present. The prosthetic aortic valve is well-seated.  There is mild paravalvular regurgitation. There is trace valvular  regurgitation.     Tricuspid Valve  The tricuspid valve is normal. The peak velocity of the tricuspid regurgitant  jet is not obtainable.     Pulmonic Valve  Trace pulmonic insufficiency is present.     Vessels  The aorta root is normal. IVC diameter <2.1 cm collapsing >50% with sniff  suggests a normal RA pressure of 3 mmHg.     Pericardium  No pericardial effusion is present.     Compared to Previous Study  No significant changes noted.     ______________________________________________________________________________  MMode/2D Measurements & Calculations  IVSd: 1.3 cm  LVIDd: 4.2 cm  LVIDs: 3.1 cm  LVPWd: 1.0 cm  FS: 26.7 %     LV mass(C)d: 167.6 grams  LV mass(C)dI: 102.0 grams/m2  asc Aorta Diam: 1.9 cm     EF(MOD-bp): 37.1 %  Asc Ao diam index BSA (cm/m2): 1.2  Asc Ao diam index Ht(cm/m): 1.3  RWT: 0.50     Doppler Measurements & Calculations  MV max P.8 mmHg  MV mean P.8 mmHg  MV V2 VTI: 29.3 cm  Ao V2 max: 241.8 cm/sec  Ao max P.4 mmHg  Ao V2 mean: 173.2 cm/sec  Ao mean P.3 mmHg  Ao V2 VTI: 46.3 cm  AI P1/2t: 607.4 msec  LV V1 max P.6 mmHg  LV V1 max: 170.0 cm/sec  LV V1 VTI: 27.9 cm  AV Skinny Ratio (DI): 0.70     ______________________________________________________________________________      ECHO 3/2023:  Interpretation Summary  The 26 mm ES3 TAVR is well-seated. Leaflet opening is not clearly visualized.  There is trace valvular without paravalvular regurgitation. The Vmax is 2.4  m/s, the mean gradient is 14 mmHg, the dimensionless index is 0.80, and the  acceleration time is 80 ms.  Left ventricular function is decreased. The  ejection fraction is 35-40%  (moderately reduced). Moderate diffuse hypokinesis is present.  Global right ventricular function is normal. The right ventricle is normal  size.  This study was compared with the study from 02/23/2023. There appears to have  been a decline in the global LV function but the endocardial definition is  poor on this study. The TAVR function is unchanged.  ______________________________________________________________________________  Left Ventricle  Mild concentric wall thickening consistent with left ventricular hypertrophy  is present. Left ventricular function is decreased. The ejection fraction is  35-40% (moderately reduced). Moderate diffuse hypokinesis is present.     Right Ventricle  Global right ventricular function is normal. The right ventricle is normal  size.     Atria  The atria cannot be assessed.     Mitral Valve  Severe mitral annular calcification is present. Mild mitral insufficiency is  present.     Aortic Valve  The 26 mm ES3 TAVR is well-seated. Leaflet opening is not clearly visualized.  There is trace valvular without paravalvular regurgitation. The Vmax is 2.4  m/s, the mean gradient is 14 mmHg, the dimensionless index is 0.80, and the  acceleration time is 80 ms.     Tricuspid Valve  The valve leaflets are not well visualized. Trace tricuspid insufficiency is  present. Pulmonary artery systolic pressure cannot be assessed.     Pulmonic Valve  Mild pulmonic insufficiency is present.     Vessels  The inferior vena cava cannot be assessed. Sinuses of Valsalva 3.0 cm.  Ascending aorta 3.4 cm.     Pericardium  No pericardial effusion is present.     Compared to Previous Study  This study was compared with the study from 02/23/2023 . There appears to have  been a decline in the global LV function but the endocardial definition is  poor on this study. The TAVR function is unchanged.  ______________________________________________________________________________  MMode/2D  Measurements & Calculations  IVSd: 1.0 cm     LVIDd: 4.9 cm  LVIDs: 3.9 cm  LVPWd: 1.4 cm  FS: 20.5 %  LV mass(C)d: 224.4 grams  LV mass(C)dI: 134.6 grams/m2  Ao root diam: 3.0 cm  LA dimension: 4.1 cm  asc Aorta Diam: 3.4 cm  LA/Ao: 1.4  LVOT diam: 1.7 cm  LVOT area: 2.2 cm2  RWT: 0.55     Doppler Measurements & Calculations  MV E max skinny: 92.6 cm/sec  MV A max skinny: 110.1 cm/sec  MV E/A: 0.84  MV max P.7 mmHg  MV mean PG: 3.2 mmHg  MV V2 VTI: 24.8 cm  MVA(VTI): 3.4 cm2  MV dec slope: 515.9 cm/sec2  MV dec time: 0.18 sec  Ao V2 max: 233.9 cm/sec  Ao max P.0 mmHg  Ao V2 mean: 165.6 cm/sec  Ao mean P.7 mmHg  Ao V2 VTI: 46.7 cm  LUCY(I,D): 1.8 cm2  LUCY(V,D): 1.8 cm2  LV V1 max PG: 15.2 mmHg  LV V1 max: 195.0 cm/sec  LV V1 VTI: 38.0 cm  SV(LVOT): 83.5 ml  SI(LVOT): 50.1 ml/m2  PA V2 max: 19.0 cm/sec  PA max P.14 mmHg     PI end-d skinny: 145.2 cm/sec  AV Skinny Ratio (DI): 0.83  LUCY Index (cm2/m2): 1.1     ______________________________________________________________________________  Report approved by: Everardo Whitt 2023 12:07 PM    Coronary angiogram 10/25/22    Two vessel CAD with prior PCI to the RCA and LAD, otherwise mild non obstructive CAD elsewhere.  Patent stents in the RCA and LAD with mild to moderate in stent restenosis of the proximal LAD stents (progressed since last angiogram from ).  Proximal LAD ISR hemodynamically not significant by dPR at 0.91.      Plan     Follow bedrest per protocol   Continued medical management and lifestyle modifications for cardiovascular risk factor optimizations.   Follow up visit with Nurse Practitioner in 1-2 weeks.   Arterial sheath removed from radial artery with TR band placement.   Cardiac rehabilitation.   Return to the primary inpatient team for further evaluation and managmenet     Coronary Findings    Diagnostic  Dominance: Right  Left Main   The vessel is moderate in size and is angiographically normal.      Left Anterior Descending   The  vessel is moderate in size.   Ost LAD to Prox LAD lesion is 50% stenosed. Measured dPr. Pre adenosine administration dPr: 0.91.   Previously placed Mid LAD-1 drug eluting stent is widely patent.   Previously placed Mid LAD-2 drug eluting stent is widely patent.   0% stenosed Dist LAD lesion.      First Diagonal Branch   The vessel is small.   1st Diag lesion is 50% stenosed.      Second Diagonal Branch   The vessel is moderate in size. The vessel exhibits minimal luminal irregularities.      Left Circumflex   The vessel is moderate in size.      First Obtuse Marginal Branch   The vessel is moderate in size.   1st Mrg lesion is 30% stenosed.      Second Obtuse Marginal Branch   The vessel is moderate in size and is angiographically normal.      Third Obtuse Marginal Branch   The vessel is moderate in size and is angiographically normal.      Right Coronary Artery   The vessel is moderate in size.   Prox RCA lesion is 40% stenosed.   Mid RCA lesion is 20% stenosed. The lesion was previously treated using a stent of unknown type.      Right Posterior Descending Artery   The vessel is moderate in size. The vessel exhibits minimal luminal irregularities.      Right Posterior Atrioventricular Artery   The vessel is moderate in size. The vessel exhibits minimal luminal irregularities.      First Right Posterolateral Branch   The vessel is moderate in size. The vessel exhibits minimal luminal irregularities.                   EKG 10/24/22:  Personally reviewed  NSR with LBBB unchanged    ECHO 10/24/22:  Interpretation Summary  Status post 26 mm Arndt Janis Ultra valve TAVR.  Global and regional left ventricular function is normal with an EF of 55-60%.  Global right ventricular function is normal. The right ventricle is normal  size.  The TAVR is well-seated. Leaflet opening is not clearly visualized. There is  no valvular regurgitation but there is trace paravalvular regurgitation. The  Vmax is 2.6 m/s, the mean gradient  is 13 mmHg, the dimensionless index is  0.34, and the acceleration time is 110 ms.  IVC diameter and respiratory changes fall into an intermediate range  suggesting an RA pressure of 8 mmHg.  This study was compared with the study from 09/13/2022. No significant changes  noted. The TAVR gradient has increased modestly, as is to be expected.  ______________________________________________________________________________  Left Ventricle  Global and regional left ventricular function is normal with an EF of 55-60%.  Left ventricular wall thickness is normal. Left ventricular size is normal.  Left ventricular diastolic function is not assessable.     Right Ventricle  Global right ventricular function is normal. The right ventricle is normal  size.     Atria  Both atria appear normal. Moderate left atrial enlargement is present.     Mitral Valve  Mild mitral annular calcification is present. Mild mitral insufficiency is  present.     Aortic Valve  The TAVR is well-seated. Leaflet opening is not clearly visualized. There is  no valvular regurgitation but there is trace paravalvular regurgitation. The  Vmax is 2.6 m/s, the mean gradient is 13 mmHg, the dimensionless index is  0.34, and the acceleration time is 110 ms.     Tricuspid Valve  The tricuspid valve is normal. Trace tricuspid insufficiency is present. The  right ventricular systolic pressure is approximated at 21.2 mmHg plus the  right atrial pressure.     Pulmonic Valve  The pulmonic valve is normal. Trace pulmonic insufficiency is present.     Vessels  The aorta root cannot be assessed. Ascending aorta 3.2 cm. IVC diameter and  respiratory changes fall into an intermediate range suggesting an RA pressure  of 8 mmHg.     Pericardium  No pericardial effusion is present.     Compared to Previous Study  This study was compared with the study from 09/13/2022 . No significant  changes noted. The TAVR gradient has increased modestly, as is to be  expected.  ______________________________________________________________________________  MMode/2D Measurements & Calculations     IVSd: 1.1 cm  LVIDd: 4.3 cm  LVPWd: 0.89 cm  LV mass(C)d: 139.1 grams  LV mass(C)dI: 83.4 grams/m2  asc Aorta Diam: 3.2 cm  LVOT diam: 1.9 cm  LVOT area: 2.9 cm2  RWT: 0.42     Doppler Measurements & Calculations  MV E max skinny: 171.0 cm/sec  MV A max skinny: 145.4 cm/sec  MV E/A: 1.2  MV max PG: 10.4 mmHg  MV mean P.3 mmHg  MV V2 VTI: 54.1 cm  MVA(VTI): 1.0 cm2  MV dec slope: 856.6 cm/sec2  MV dec time: 0.20 sec  Ao V2 max: 254.2 cm/sec  Ao max P.0 mmHg  Ao V2 mean: 171.2 cm/sec  Ao mean P.7 mmHg  Ao V2 VTI: 53.9 cm  LUCY(I,D): 1.0 cm2  LUCY(V,D): 0.99 cm2  AI P1/2t: 688.4 msec  LV V1 max PG: 3.1 mmHg  LV V1 max: 88.0 cm/sec  LV V1 VTI: 19.5 cm  SV(LVOT): 55.9 ml  SI(LVOT): 33.6 ml/m2  TR max skinny: 229.8 cm/sec  TR max P.2 mmHg     AV Skinny Ratio (DI): 0.35  LUCY Index (cm2/m2): 0.62       Cardiac monitor 22:      ECG dated 22  SR 1st degree AVB and LBBB    Echocardiogram dated  22:    Interpretation Summary  Normal biventricular function.  Post 26 mm Arndt Janis Ultra valve Aortic Valve placement. The valve is  well seated. There is trivial periprosthetic regurgitation. Mean gradient is 7  mmHg.  The man RA pressure is normal.  No pericardial effusion is present.    NYHA Class: II    Coronary angiogram 2020:    Physicians    Panel Physicians Referring Physician Case Authorizing Physician   Gianluca Toscano MD (Primary) Self, Referred, Gianluca Junior MD Ibrahim, Abdisamad, MD (Fellow - Assisting)     Jose Alfredo Matias MD (Fellow - Assisting)       Procedures    Panel 1    Primary Surgeon:  Gianluca Toscano MD   Procedure:  CV CORONARY ANGIOGRAM    CV RIGHT HEART CATH    CV LEFT HEART CATH    Percutaneous Coronary Intervention Stent Drug Eluting        Indications    Severe aortic stenosis [I35.0 (ICD-10-CM)]   Other ill-defined heart  diseases [I51.89 (ICD-10-CM)]     Comments/Patient Narrative    85 year old female with with aortic stenosis here for cors/RHC for TAVR work up.     Pre Procedure Diagnosis    severe aortic stenosis    Post Procedure Diagnosis    s/p EDDIE on LAD x 3.      Conclusion       Dist LAD lesion is 70% stenosed.  Mid LAD-1 lesion is 80% stenosed.  Mid LAD-2 lesion is 60% stenosed.  Mid RCA lesion is 40% stenosed.  Prox RCA lesion is 40% stenosed.  Right sided filling pressures are normal.  Normal PA pressures.  Left sided filling pressures are normal.  Reduced cardiac output.     1. LAD - 3 EDDIE were placed in the mid to distal LAD (2.5 x 28 mm, 3.5 x 12 mm, and 3.0 x 8 mm)  2. RHC showed normal biventricular filling pressures. Normal PA pressure. Reduced cardiac output.            Plan     Follow bedrest per protocol   Continued medical management and lifestyle modifications for cardiovascular risk factor optimizations.   Arterial sheath removed from femoral artery with closure device.   Discharge today per protocol     Coronary Findings    Diagnostic  Dominance: Right  Left Anterior Descending   Mid LAD-1 lesion is 80% stenosed.   Mid LAD-2 lesion is 60% stenosed.   Dist LAD lesion is 70% stenosed.      Right Coronary Artery   The vessel is moderate in size.   Prox RCA lesion is 40% stenosed.   Mid RCA lesion is 40% stenosed.         Intervention     Mid LAD-1 lesion   Stent   The pre-interventional distal flow is decreased (FALGUNI 2). A STENT SYNERGY DRUG ELUTING 2.32O75JR [id number] drug eluting stent was successfully placed. No pre-stent angioplasty was performed. The post-interventional distal flow is normal (FALGUNI 3). The intervention was successful. No complications occurred at this lesion.   There is a 0% residual stenosis post intervention.      Mid LAD-2 lesion   Stent   The pre-interventional distal flow is decreased (FALGUNI 2). A STENT SYNERGY DRUG ELUTING 3.76Q75ZL [id number] drug eluting stent was  successfully placed. No pre-stent angioplasty was performed. The strut is apposed. No post-stent angioplasty was performed. The post-interventional distal flow is normal (FALGUNI 3). The intervention was successful. No complications occurred at this lesion.   There is a 0% residual stenosis post intervention.      Dist LAD lesion   Stent   The pre-interventional distal flow is decreased (FALGUNI 2). A STENT SYNERGY DRUG ELUTING 3.92Z48DI [id number] drug eluting stent was successfully placed. No pre-stent angioplasty was performed. The strut is apposed. No post-stent angioplasty was performed. The post-interventional distal flow is normal (FALGUNI 3). The intervention was successful. No complications occurred at this lesion.   There is a 0% residual stenosis post intervention.           Hemodynamics    Right sided filling pressures are normal.Left sided filling pressures are normal. Normal PA pressures.Reduced cardiac output level.            CLINICAL IMPRESSION:     Kamryn Miller is a very pleasant 88 year old female who presents for 1 year TAVR follow-up. She has a history of HFpEF, HTN, HLD, significant CAD history (PTCA 1996, LAD and RCA stenting 2003, s/p LAD PCI 2020, recent cath 10/2022 with patent stents), CKD, chronic anemia and severe aortic valvular stenosis s/p (TAVR) with a 26mm Arndt Janis Ultra on 9/12/22 by Morro Burrows. Her post TAVR echo showed mean PG of 7 mmHg with trace PVL. She was noted to have 60% left femoral stenosis post procedure, but had adequate flow on peripheral angiogram. She was also noted to have new LBBB post procedure and discharged on a Cardionet which showed no high degree AVB.     She was last evaluated 1 month post TAVR. ECHO showed mean PG 13 mmHg with trace PVL. At the visit she reported worsening shortness of breath and angina. Underwent coronary angiogram 10/2022 which showed patent stents. Over the past few months her blood pressures have been labile and hypertension  difficult to treat. She has intolerances to beta blockers, amlodipine and hydralazine. Now following with CORE, BP and volume status well controlled. ECHO show LVEF continues to decline down from 55-60 pre TAVR to 30-35% today, unclear etiology. Will discuss further testing with Dr. Zuniga. HALT has resolved on CT today so will discontinue AC. She reports feeling well without heart failure symptoms. Weight stable at 144 lbs. VSS/     Aortic stenosis s/p TAVR 9/2022:  - doing well clinically without cardiac symptoms  - ECHO today with normal TAVR function, mean PG 13 mmHg with mild PVL  - CT heart shows resolution of HALT   - Stop Eliquis, continue ASA 81 mg lifelong   - SBE prophylaxis lifelong  - ECHO in 1 year     HFpEF, now HFrEF EF 30-35%:  - EF had been preserved 55-60%, last admission 2/2023 45-50%, 7/2023 35-40%, now 30-35%  - Unclear etiology, cath 10/2022 with patent stents, thought possibly related to poorly controlled HTN but now BP well controlled  - Will discuss further testing with Dr. Zuniga  - Euvolemic today at 144 lbs, continue lasix 10 mg daily  - Continue entresto 49-51 mg BID, Jardiance 10 mg, Lasix 30 mg daily    Coronary artery disease w/stable angina:  HTN:  HLD:  - Continue ASA 81 mg daily lifelong  - Continue high intensity statin  - Multiple intolerances to BB, hydralazine   - Enstresto and amlodipine for BP control   - Continue exercise and heart healthy diet    PAD:   - denies claudication, normal pulse exam on left  - recent BALDO's mildly abnormal on left  - Continue ASA 81 mg lifelong  - vascular consult should she develop recurrent claudication      RTC: Dr. Zuniga next month, CORE 3 months, return to structural clinic PRN    JESSICA Rousseau CNP  [facility] Cardiology Team      CC  Patient Care Team:  Pham Layne CNP as PCP - Rolanda Lawson MD as Assigned PCP    Structural Heart Coordinator visit:  Provided additional education regarding TAVR/MitraClip procedure, after being  present for discussion with physician. Explained the work-up process and next steps for patient. Patient provided our direct contact number and instructed to call with any questions.             KCCQ Results:   1a. 5  1b. 5  1c. 4  2. 5  3. 7  4. 7  5. 5  6. 5  7. 4  8a. 5  8b. 5  8c. 5    Maria L Gerard CMA  Structural Heart   [facility]       Please do not hesitate to contact me if you have any questions/concerns.     Sincerely,     Pati De Santiago NP

## 2024-01-02 ENCOUNTER — HOSPITAL ENCOUNTER (OUTPATIENT)
Age: 34
Discharge: HOME | End: 2024-01-02
Payer: MEDICAID

## 2024-01-02 LAB
AMOUNT GLUCOSE GIVEN: 50 G
COLLECT TIME, 1HR GLUCOSE: NORMAL
GLUCOSE 3H P 100 G GLC PO SERPL-MCNC: 65 MG/DL

## 2024-01-02 PROCEDURE — 82947 ASSAY GLUCOSE BLOOD QUANT: CPT

## 2024-01-02 PROCEDURE — 36415 COLL VENOUS BLD VENIPUNCTURE: CPT

## 2024-02-09 ENCOUNTER — HOSPITAL ENCOUNTER (OUTPATIENT)
Age: 34
Discharge: HOME | End: 2024-02-09
Attending: OBSTETRICS & GYNECOLOGY | Admitting: OBSTETRICS & GYNECOLOGY
Payer: MEDICAID

## 2024-02-09 ENCOUNTER — APPOINTMENT (OUTPATIENT)
Dept: LABOR AND DELIVERY | Age: 34
End: 2024-02-09
Payer: MEDICAID

## 2024-02-09 VITALS
TEMPERATURE: 97.8 F | HEART RATE: 100 BPM | DIASTOLIC BLOOD PRESSURE: 64 MMHG | RESPIRATION RATE: 18 BRPM | SYSTOLIC BLOOD PRESSURE: 108 MMHG

## 2024-02-09 PROBLEM — Z34.93 NORMAL PREGNANCY, THIRD TRIMESTER: Status: ACTIVE | Noted: 2024-02-09

## 2024-02-09 PROCEDURE — 59025 FETAL NON-STRESS TEST: CPT

## 2024-02-09 NOTE — PROGRESS NOTES
39w6d  Patient on unit for NST for post dates. Pt reports no leaking bleeding or ctx and perceives FM. Pt placed on EFM and call light within reach.

## 2024-02-13 ENCOUNTER — APPOINTMENT (OUTPATIENT)
Dept: LABOR AND DELIVERY | Age: 34
End: 2024-02-13
Payer: MEDICAID

## 2024-02-13 ENCOUNTER — APPOINTMENT (OUTPATIENT)
Dept: ULTRASOUND IMAGING | Age: 34
End: 2024-02-13
Payer: MEDICAID

## 2024-02-13 ENCOUNTER — HOSPITAL ENCOUNTER (OUTPATIENT)
Age: 34
Discharge: HOME | End: 2024-02-13
Attending: OBSTETRICS & GYNECOLOGY | Admitting: OBSTETRICS & GYNECOLOGY
Payer: MEDICAID

## 2024-02-13 ENCOUNTER — HOSPITAL ENCOUNTER (OUTPATIENT)
Age: 34
Setting detail: OBSERVATION
Discharge: HOME | End: 2024-02-13
Attending: OBSTETRICS & GYNECOLOGY | Admitting: OBSTETRICS & GYNECOLOGY
Payer: MEDICAID

## 2024-02-13 VITALS
TEMPERATURE: 98.1 F | DIASTOLIC BLOOD PRESSURE: 68 MMHG | SYSTOLIC BLOOD PRESSURE: 110 MMHG | OXYGEN SATURATION: 98 % | RESPIRATION RATE: 16 BRPM | HEART RATE: 95 BPM

## 2024-02-13 PROBLEM — O26.90 PREGNANCY, COMPLICATED: Status: ACTIVE | Noted: 2024-02-13

## 2024-02-13 PROCEDURE — G0379 DIRECT REFER HOSPITAL OBSERV: HCPCS

## 2024-02-13 PROCEDURE — 76819 FETAL BIOPHYS PROFIL W/O NST: CPT

## 2024-02-13 PROCEDURE — 59025 FETAL NON-STRESS TEST: CPT

## 2024-02-13 PROCEDURE — G0378 HOSPITAL OBSERVATION PER HR: HCPCS

## 2024-02-13 NOTE — PROGRESS NOTES
GP: 6/5    EDC/WEEKS:40w3d    Here for NST today for post dates    Patient placed on EFM where audible fetal movement noted by RN and positively perceived by patient. She denies LOF/Vaginal bleeding and reports good health. Patient understanding of testing, call light given.

## 2024-02-13 NOTE — PROGRESS NOTES
Dr. Del Castillo in department. Strip reviewed. New order received for BPP and continued monitoring. Patient requesting AMA form to leave. Education given by Dr. Del Castillo.

## 2024-02-13 NOTE — PROGRESS NOTES
Diagnosis:   Postdates    Result:  Non Reactive       Plan:  Biophysical profile.    Patient brought her daughter with her and cannot stay and would like to be discharged AGAINST MEDICAL ADVICE to take her child home and to and will come back immediately.  Risks of leaving the hospital at this time without further diagnostic evaluation has been discussed      Sharan Garcia MD

## 2024-02-14 NOTE — PROGRESS NOTES
Assumed care of patient for [id number] shift. Plan of care reviewed with patient, patient verbalizes understanding. Patient denies bleeding, leaking and ctx. Patient perceives fetal movement.

## 2024-02-14 NOTE — PROGRESS NOTES
Dr. Del Castillo notified of BPP results and EFM. Orders to discharge patient and keep already scheduled appointment in the office.

## 2024-02-17 ENCOUNTER — HOSPITAL ENCOUNTER (INPATIENT)
Age: 34
LOS: 3 days | Discharge: HOME | DRG: 560 | End: 2024-02-20
Attending: OBSTETRICS & GYNECOLOGY | Admitting: OBSTETRICS & GYNECOLOGY
Payer: MEDICAID

## 2024-02-17 LAB
ABO + RH BLD: NORMAL
ALBUMIN SERPL-MCNC: 3.6 G/DL (ref 3.5–5.2)
ALP SERPL-CCNC: 188 U/L (ref 35–104)
ALT SERPL-CCNC: 12 U/L (ref 0–32)
AMPHET UR QL SCN: NEGATIVE
ANION GAP SERPL CALCULATED.3IONS-SCNC: 12 MMOL/L (ref 7–16)
ARM BAND NUMBER: NORMAL
AST SERPL-CCNC: 23 U/L (ref 0–31)
BARBITURATES UR QL SCN: NEGATIVE
BASOPHILS # BLD: 0.01 K/UL (ref 0–0.2)
BASOPHILS NFR BLD: 0 % (ref 0–2)
BENZODIAZ UR QL: NEGATIVE
BILIRUB SERPL-MCNC: 0.2 MG/DL (ref 0–1.2)
BLOOD BANK SAMPLE EXPIRATION: NORMAL
BLOOD GROUP ANTIBODIES SERPL: NEGATIVE
BUN SERPL-MCNC: 11 MG/DL (ref 6–20)
BUPRENORPHINE UR QL: POSITIVE
CALCIUM SERPL-MCNC: 9.9 MG/DL (ref 8.6–10.2)
CANNABINOIDS UR QL SCN: NEGATIVE
CHLORIDE SERPL-SCNC: 104 MMOL/L (ref 98–107)
CO2 SERPL-SCNC: 19 MMOL/L (ref 22–29)
COCAINE UR QL SCN: NEGATIVE
CREAT SERPL-MCNC: 0.7 MG/DL (ref 0.5–1)
EOSINOPHIL # BLD: 0.07 K/UL (ref 0.05–0.5)
EOSINOPHILS RELATIVE PERCENT: 1 % (ref 0–6)
ERYTHROCYTE [DISTWIDTH] IN BLOOD BY AUTOMATED COUNT: 12.7 % (ref 11.5–15)
FENTANYL UR QL: NEGATIVE
GFR SERPL CREATININE-BSD FRML MDRD: >60 ML/MIN/1.73M2
GLUCOSE SERPL-MCNC: 87 MG/DL (ref 74–99)
HCT VFR BLD AUTO: 33.8 % (ref 34–48)
HGB BLD-MCNC: 11.9 G/DL (ref 11.5–15.5)
IMM GRANULOCYTES # BLD AUTO: 0.03 K/UL (ref 0–0.58)
IMM GRANULOCYTES NFR BLD: 0 % (ref 0–5)
LYMPHOCYTES NFR BLD: 1.46 K/UL (ref 1.5–4)
LYMPHOCYTES RELATIVE PERCENT: 17 % (ref 20–42)
MCH RBC QN AUTO: 33.1 PG (ref 26–35)
MCHC RBC AUTO-ENTMCNC: 35.2 G/DL (ref 32–34.5)
MCV RBC AUTO: 93.9 FL (ref 80–99.9)
METHADONE UR QL: NEGATIVE
MONOCYTES NFR BLD: 0.58 K/UL (ref 0.1–0.95)
MONOCYTES NFR BLD: 7 % (ref 2–12)
NEUTROPHILS NFR BLD: 75 % (ref 43–80)
NEUTS SEG NFR BLD: 6.45 K/UL (ref 1.8–7.3)
OPIATES UR QL SCN: NEGATIVE
OXYCODONE UR QL SCN: NEGATIVE
PCP UR QL SCN: NEGATIVE
PLATELET # BLD AUTO: 236 K/UL (ref 130–450)
PMV BLD AUTO: 10.7 FL (ref 7–12)
POTASSIUM SERPL-SCNC: 4.2 MMOL/L (ref 3.5–5)
PROT SERPL-MCNC: 7.8 G/DL (ref 6.4–8.3)
RBC # BLD AUTO: 3.6 M/UL (ref 3.5–5.5)
SODIUM SERPL-SCNC: 135 MMOL/L (ref 132–146)
TEST INFORMATION: ABNORMAL
WBC OTHER # BLD: 8.6 K/UL (ref 4.5–11.5)

## 2024-02-17 PROCEDURE — 87491 CHLMYD TRACH DNA AMP PROBE: CPT

## 2024-02-17 PROCEDURE — 86850 RBC ANTIBODY SCREEN: CPT

## 2024-02-17 PROCEDURE — 86900 BLOOD TYPING SEROLOGIC ABO: CPT

## 2024-02-17 PROCEDURE — 87591 N.GONORRHOEAE DNA AMP PROB: CPT

## 2024-02-17 PROCEDURE — 86592 SYPHILIS TEST NON-TREP QUAL: CPT

## 2024-02-17 PROCEDURE — 80307 DRUG TEST PRSMV CHEM ANLYZR: CPT

## 2024-02-17 PROCEDURE — 1220000001 HC SEMI PRIVATE L&D R&B

## 2024-02-17 PROCEDURE — 86901 BLOOD TYPING SEROLOGIC RH(D): CPT

## 2024-02-17 PROCEDURE — 6360000002 HC RX W HCPCS: Performed by: OBSTETRICS & GYNECOLOGY

## 2024-02-17 PROCEDURE — 85025 COMPLETE CBC W/AUTO DIFF WBC: CPT

## 2024-02-17 PROCEDURE — 3E033VJ INTRODUCTION OF OTHER HORMONE INTO PERIPHERAL VEIN, PERCUTANEOUS APPROACH: ICD-10-PCS | Performed by: OBSTETRICS & GYNECOLOGY

## 2024-02-17 PROCEDURE — G0480 DRUG TEST DEF 1-7 CLASSES: HCPCS

## 2024-02-17 PROCEDURE — 87081 CULTURE SCREEN ONLY: CPT

## 2024-02-17 PROCEDURE — 2580000003 HC RX 258: Performed by: OBSTETRICS & GYNECOLOGY

## 2024-02-17 PROCEDURE — 80053 COMPREHEN METABOLIC PANEL: CPT

## 2024-02-17 RX ORDER — SODIUM CHLORIDE, SODIUM LACTATE, POTASSIUM CHLORIDE, CALCIUM CHLORIDE 600; 310; 30; 20 MG/100ML; MG/100ML; MG/100ML; MG/100ML
INJECTION, SOLUTION INTRAVENOUS CONTINUOUS
Status: DISCONTINUED | OUTPATIENT
Start: 2024-02-17 | End: 2024-02-18

## 2024-02-17 RX ORDER — CARBOPROST TROMETHAMINE 250 UG/ML
250 INJECTION, SOLUTION INTRAMUSCULAR PRN
Status: DISCONTINUED | OUTPATIENT
Start: 2024-02-17 | End: 2024-02-18

## 2024-02-17 RX ORDER — BUPRENORPHINE 8 MG/1
8 TABLET SUBLINGUAL 2 TIMES DAILY
COMMUNITY

## 2024-02-17 RX ORDER — MISOPROSTOL 200 UG/1
800 TABLET ORAL PRN
Status: DISCONTINUED | OUTPATIENT
Start: 2024-02-17 | End: 2024-02-18

## 2024-02-17 RX ORDER — SODIUM CHLORIDE 9 MG/ML
25 INJECTION, SOLUTION INTRAVENOUS PRN
Status: DISCONTINUED | OUTPATIENT
Start: 2024-02-17 | End: 2024-02-18

## 2024-02-17 RX ORDER — BUPRENORPHINE 8 MG/1
8 TABLET SUBLINGUAL 2 TIMES DAILY
Status: DISCONTINUED | OUTPATIENT
Start: 2024-02-17 | End: 2024-02-18

## 2024-02-17 RX ORDER — SODIUM CHLORIDE, SODIUM LACTATE, POTASSIUM CHLORIDE, AND CALCIUM CHLORIDE .6; .31; .03; .02 G/100ML; G/100ML; G/100ML; G/100ML
500 INJECTION, SOLUTION INTRAVENOUS PRN
Status: DISCONTINUED | OUTPATIENT
Start: 2024-02-17 | End: 2024-02-18

## 2024-02-17 RX ORDER — SODIUM CHLORIDE 0.9 % (FLUSH) 0.9 %
5-40 SYRINGE (ML) INJECTION EVERY 12 HOURS SCHEDULED
Status: DISCONTINUED | OUTPATIENT
Start: 2024-02-17 | End: 2024-02-18

## 2024-02-17 RX ORDER — SODIUM CHLORIDE, SODIUM LACTATE, POTASSIUM CHLORIDE, AND CALCIUM CHLORIDE .6; .31; .03; .02 G/100ML; G/100ML; G/100ML; G/100ML
1000 INJECTION, SOLUTION INTRAVENOUS PRN
Status: DISCONTINUED | OUTPATIENT
Start: 2024-02-17 | End: 2024-02-18

## 2024-02-17 RX ORDER — METHYLERGONOVINE MALEATE 0.2 MG/ML
200 INJECTION INTRAVENOUS PRN
Status: DISCONTINUED | OUTPATIENT
Start: 2024-02-17 | End: 2024-02-18

## 2024-02-17 RX ORDER — ONDANSETRON 2 MG/ML
4 INJECTION INTRAMUSCULAR; INTRAVENOUS EVERY 6 HOURS PRN
Status: DISCONTINUED | OUTPATIENT
Start: 2024-02-17 | End: 2024-02-18

## 2024-02-17 RX ORDER — SODIUM CHLORIDE 0.9 % (FLUSH) 0.9 %
5-40 SYRINGE (ML) INJECTION PRN
Status: DISCONTINUED | OUTPATIENT
Start: 2024-02-17 | End: 2024-02-18

## 2024-02-17 RX ADMIN — SODIUM CHLORIDE 5 MILLION UNITS: 900 INJECTION INTRAVENOUS at 18:31

## 2024-02-17 RX ADMIN — Medication 2 MILLI-UNITS/MIN: at 18:45

## 2024-02-17 RX ADMIN — SODIUM CHLORIDE, POTASSIUM CHLORIDE, SODIUM LACTATE AND CALCIUM CHLORIDE: 600; 310; 30; 20 INJECTION, SOLUTION INTRAVENOUS at 18:16

## 2024-02-17 RX ADMIN — Medication 2.5 MILLION UNITS: at 22:21

## 2024-02-17 RX ADMIN — BUPRENORPHINE 8 MG: 8 TABLET SUBLINGUAL at 20:54

## 2024-02-17 ASSESSMENT — PAIN SCALES - GENERAL
PAINLEVEL_OUTOF10: 0
PAINLEVEL_OUTOF10: 0

## 2024-02-17 NOTE — PROGRESS NOTES
Dr Reagan updated on pt arrival and status- orders received to admit patient and start pitocin- pt okay for epidural when she wants on.

## 2024-02-17 NOTE — PROGRESS NOTES
41 W 0 D   EDC 2-  Ambulatory patient in stating Dr Garcia had IOL scheduled for 2024, but patient did not have a ride and Dr told her to come when she did have a ride, patient arrived to L&D.   Meternal perception of fetal movement noted. Pt denies bleeding, leaking of fluids or contractions. Pt states no complaints at this time. Call light within reach, ice water provided to pt.

## 2024-02-18 ENCOUNTER — ANESTHESIA (OUTPATIENT)
Dept: LABOR AND DELIVERY | Age: 34
DRG: 560 | End: 2024-02-18
Payer: MEDICAID

## 2024-02-18 ENCOUNTER — ANESTHESIA EVENT (OUTPATIENT)
Dept: LABOR AND DELIVERY | Age: 34
DRG: 560 | End: 2024-02-18
Payer: MEDICAID

## 2024-02-18 PROBLEM — Z34.93 NORMAL PREGNANCY, THIRD TRIMESTER: Status: RESOLVED | Noted: 2024-02-09 | Resolved: 2024-02-18

## 2024-02-18 PROBLEM — O48.0 41 WEEKS GESTATION OF PREGNANCY: Status: ACTIVE | Noted: 2024-02-18

## 2024-02-18 PROBLEM — Z3A.41 41 WEEKS GESTATION OF PREGNANCY: Status: ACTIVE | Noted: 2024-02-18

## 2024-02-18 PROBLEM — O99.323 DRUG USE COMPLICATING PREGNANCY IN THIRD TRIMESTER: Status: ACTIVE | Noted: 2024-02-18

## 2024-02-18 PROCEDURE — 62325 NJX INTERLAMINAR CRV/THRC: CPT | Performed by: ANESTHESIOLOGY

## 2024-02-18 PROCEDURE — 1220000001 HC SEMI PRIVATE L&D R&B

## 2024-02-18 PROCEDURE — 2500000003 HC RX 250 WO HCPCS: Performed by: ANESTHESIOLOGY

## 2024-02-18 PROCEDURE — 6370000000 HC RX 637 (ALT 250 FOR IP): Performed by: OBSTETRICS & GYNECOLOGY

## 2024-02-18 PROCEDURE — 59050 FETAL MONITOR W/REPORT: CPT

## 2024-02-18 PROCEDURE — 6360000002 HC RX W HCPCS: Performed by: OBSTETRICS & GYNECOLOGY

## 2024-02-18 PROCEDURE — 2580000003 HC RX 258: Performed by: OBSTETRICS & GYNECOLOGY

## 2024-02-18 PROCEDURE — 51702 INSERT TEMP BLADDER CATH: CPT

## 2024-02-18 PROCEDURE — 7200000001 HC VAGINAL DELIVERY

## 2024-02-18 RX ORDER — SODIUM CHLORIDE 0.9 % (FLUSH) 0.9 %
5-40 SYRINGE (ML) INJECTION EVERY 12 HOURS SCHEDULED
Status: DISCONTINUED | OUTPATIENT
Start: 2024-02-18 | End: 2024-02-20 | Stop reason: HOSPADM

## 2024-02-18 RX ORDER — ONDANSETRON 2 MG/ML
4 INJECTION INTRAMUSCULAR; INTRAVENOUS EVERY 6 HOURS PRN
Status: DISCONTINUED | OUTPATIENT
Start: 2024-02-18 | End: 2024-02-18

## 2024-02-18 RX ORDER — FERROUS SULFATE 325(65) MG
325 TABLET ORAL 2 TIMES DAILY WITH MEALS
Status: DISCONTINUED | OUTPATIENT
Start: 2024-02-19 | End: 2024-02-20 | Stop reason: HOSPADM

## 2024-02-18 RX ORDER — DOCUSATE SODIUM 100 MG/1
100 CAPSULE, LIQUID FILLED ORAL 2 TIMES DAILY
Status: DISCONTINUED | OUTPATIENT
Start: 2024-02-18 | End: 2024-02-20 | Stop reason: HOSPADM

## 2024-02-18 RX ORDER — ACETAMINOPHEN 500 MG
1000 TABLET ORAL EVERY 8 HOURS PRN
Status: DISCONTINUED | OUTPATIENT
Start: 2024-02-18 | End: 2024-02-20 | Stop reason: HOSPADM

## 2024-02-18 RX ORDER — IBUPROFEN 800 MG/1
800 TABLET ORAL EVERY 8 HOURS PRN
Status: DISCONTINUED | OUTPATIENT
Start: 2024-02-18 | End: 2024-02-20 | Stop reason: HOSPADM

## 2024-02-18 RX ORDER — SODIUM CHLORIDE 9 MG/ML
INJECTION, SOLUTION INTRAVENOUS PRN
Status: DISCONTINUED | OUTPATIENT
Start: 2024-02-18 | End: 2024-02-20 | Stop reason: HOSPADM

## 2024-02-18 RX ORDER — SODIUM CHLORIDE 0.9 % (FLUSH) 0.9 %
5-40 SYRINGE (ML) INJECTION PRN
Status: DISCONTINUED | OUTPATIENT
Start: 2024-02-18 | End: 2024-02-20 | Stop reason: HOSPADM

## 2024-02-18 RX ORDER — NALOXONE HYDROCHLORIDE 0.4 MG/ML
INJECTION, SOLUTION INTRAMUSCULAR; INTRAVENOUS; SUBCUTANEOUS PRN
Status: DISCONTINUED | OUTPATIENT
Start: 2024-02-18 | End: 2024-02-18

## 2024-02-18 RX ORDER — BUPRENORPHINE 8 MG/1
8 TABLET SUBLINGUAL 2 TIMES DAILY
Status: DISCONTINUED | OUTPATIENT
Start: 2024-02-18 | End: 2024-02-20 | Stop reason: HOSPADM

## 2024-02-18 RX ORDER — DIPHENHYDRAMINE HCL 25 MG
25 TABLET ORAL EVERY 6 HOURS PRN
Status: DISCONTINUED | OUTPATIENT
Start: 2024-02-18 | End: 2024-02-18

## 2024-02-18 RX ORDER — DIPHENHYDRAMINE HYDROCHLORIDE 50 MG/ML
25 INJECTION INTRAMUSCULAR; INTRAVENOUS EVERY 6 HOURS PRN
Status: DISCONTINUED | OUTPATIENT
Start: 2024-02-18 | End: 2024-02-18

## 2024-02-18 RX ORDER — ONDANSETRON 4 MG/1
8 TABLET, ORALLY DISINTEGRATING ORAL EVERY 8 HOURS PRN
Status: DISCONTINUED | OUTPATIENT
Start: 2024-02-18 | End: 2024-02-20 | Stop reason: HOSPADM

## 2024-02-18 RX ORDER — MODIFIED LANOLIN
OINTMENT (GRAM) TOPICAL PRN
Status: DISCONTINUED | OUTPATIENT
Start: 2024-02-18 | End: 2024-02-20 | Stop reason: HOSPADM

## 2024-02-18 RX ADMIN — Medication 15 ML/HR: at 10:49

## 2024-02-18 RX ADMIN — Medication 2.5 MILLION UNITS: at 11:03

## 2024-02-18 RX ADMIN — Medication 2.5 MILLION UNITS: at 16:50

## 2024-02-18 RX ADMIN — SODIUM CHLORIDE, POTASSIUM CHLORIDE, SODIUM LACTATE AND CALCIUM CHLORIDE 1000 ML: 600; 310; 30; 20 INJECTION, SOLUTION INTRAVENOUS at 10:51

## 2024-02-18 RX ADMIN — Medication 2.5 MILLION UNITS: at 06:31

## 2024-02-18 RX ADMIN — ACETAMINOPHEN 1000 MG: 500 TABLET ORAL at 20:40

## 2024-02-18 RX ADMIN — BUPRENORPHINE 8 MG: 8 TABLET SUBLINGUAL at 09:42

## 2024-02-18 RX ADMIN — Medication 2.5 MILLION UNITS: at 02:35

## 2024-02-18 RX ADMIN — SODIUM CHLORIDE, POTASSIUM CHLORIDE, SODIUM LACTATE AND CALCIUM CHLORIDE 1000 ML: 600; 310; 30; 20 INJECTION, SOLUTION INTRAVENOUS at 09:32

## 2024-02-18 RX ADMIN — Medication 15 ML: at 10:44

## 2024-02-18 RX ADMIN — DOCUSATE SODIUM 100 MG: 100 CAPSULE, LIQUID FILLED ORAL at 20:40

## 2024-02-18 RX ADMIN — BUPRENORPHINE 8 MG: 8 TABLET SUBLINGUAL at 20:40

## 2024-02-18 ASSESSMENT — PAIN DESCRIPTION - LOCATION
LOCATION: VAGINA
LOCATION: ABDOMEN

## 2024-02-18 ASSESSMENT — PAIN DESCRIPTION - DESCRIPTORS
DESCRIPTORS: ACHING;BURNING;CRAMPING
DESCRIPTORS: CRAMPING

## 2024-02-18 ASSESSMENT — PAIN DESCRIPTION - ORIENTATION: ORIENTATION: LOWER

## 2024-02-18 ASSESSMENT — PAIN - FUNCTIONAL ASSESSMENT: PAIN_FUNCTIONAL_ASSESSMENT: ACTIVITIES ARE NOT PREVENTED

## 2024-02-18 ASSESSMENT — PAIN SCALES - GENERAL: PAINLEVEL_OUTOF10: 8

## 2024-02-18 NOTE — PROGRESS NOTES
RN observing fetal heart rate and uterine contractions every 15 minutes, alarm on fetal monitor turned on.

## 2024-02-18 NOTE — PROGRESS NOTES
Assumed care of patient for 7 am - 7 pm shift. RN monitoring fetal heart rate and uterine activity every 15 minutes while patient is in labor. Maternal perception of movement noted. Plan of care discussed with patient; patient verbalizes understanding. Call light in reach. Pt denies bleeding, leaking of fluids, with occasional contractions 7/10 pain with contractions.

## 2024-02-18 NOTE — ANESTHESIA PRE PROCEDURE
Drug-induced constipation K59.03    Polysubstance abuse (HCC) F19.10    Chest pain R07.9    Intractable headache R51.9    Normal pregnancy, third trimester Z34.93    Pregnancy, complicated O26.90       Past Medical History:        Diagnosis Date    Abnormal Pap smear of cervix     Anemia     Drug abuse (HCC)     Obesity     Overdose     multiple    Preeclampsia complicating hypertension 4/23/2021       Past Surgical History:        Procedure Laterality Date    ANKLE FRACTURE SURGERY Right 1/6/2023    RIGHT ANKLE ORIF VS EXTERNAL FIXATION, SYNTHES, NEEDS NERVE BLOCK [place] [facility] [place]       Social History:    Social History     Tobacco Use    Smoking status: Every Day     Current packs/day: 0.50     Average packs/day: 0.5 packs/day for 13.0 years (6.5 ttl pk-yrs)     Types: Cigarettes    Smokeless tobacco: Never   Substance Use Topics    Alcohol use: No                                Ready to quit: Not Answered  Counseling given: Not Answered      Vital Signs (Current):   Vitals:    02/18/24 0737 02/18/24 0800 02/18/24 0830 02/18/24 0901   BP: 118/73 117/77 113/70 110/64   Pulse: 81 81 83 82   Resp: 16 16 14 16   Temp:       TempSrc:       SpO2:       Weight:       Height:                                                  BP Readings from Last 3 Encounters:   02/18/24 110/64   02/13/24 110/68   02/09/24 108/64       NPO Status:                                                                                 BMI:   Wt Readings from Last 3 Encounters:   02/17/24 99.8 kg (220 lb)   04/09/23 94.8 kg (209 lb)   01/18/23 94.8 kg (209 lb)     Body mass index is 34.46 kg/m².    CBC:   Lab Results   Component Value Date/Time    WBC 8.6 02/17/2024 06:10 PM    RBC 3.60 02/17/2024 06:10 PM    HGB 11.9 02/17/2024 06:10 PM    HCT 33.8 02/17/2024 06:10 PM    MCV 93.9 02/17/2024 06:10 PM    RDW 12.7 02/17/2024 06:10 PM     02/17/2024 06:10 PM       CMP:   Lab Results   Component Value Date/Time    NA

## 2024-02-18 NOTE — PROGRESS NOTES
This RN updated Dr Garcia on patient status.  ordered to cut epidural in half. This RN called CRNA for order

## 2024-02-18 NOTE — H&P
Subjective:      Kecia Tiwari is an 33 y.o. female at 41 and 1/7 weeks gestation presenting with labor pains  She is also complaining of contractions every 4 minutes lasting for 3 seconds. Other associated symptoms include low back pain. Fetal Movement: normal.  Patient was currently on Subutex with history of drug abuse  Patient's medications, allergies, past medical, surgical, social and family histories were reviewed and updated as appropriate.  Social History:  TOBACCO:   reports that she has been smoking cigarettes. She has a 6.5 pack-year smoking history. She has never used smokeless tobacco.  ETOH:   reports no history of alcohol use.  DRUGS:   reports that she does not currently use drugs after having used the following drugs: Methamphetamines (Crystal Meth).    Family History:   No family history on file.    meds:  Current Facility-Administered Medications:     lactated ringers IV soln infusion, , IntraVENous, Continuous, Sharan Garcia MD, Paused at 02/18/24 0934    lactated ringers bolus bolus 500 mL, 500 mL, IntraVENous, PRN **OR** lactated ringers bolus bolus 1,000 mL, 1,000 mL, IntraVENous, PRN, Sharan Garcia MD, Last Rate: 999 mL/hr at 02/18/24 0932, 1,000 mL at 02/18/24 0932    sodium chloride flush 0.9 % injection 5-40 mL, 5-40 mL, IntraVENous, 2 times per day, Sharan Garcia MD    sodium chloride flush 0.9 % injection 5-40 mL, 5-40 mL, IntraVENous, PRN, Sharan Garcia MD    0.9 % sodium chloride infusion, 25 mL, IntraVENous, PRN, Sharan Garcia MD    oxytocin (PITOCIN) 30 units in 500 mL infusion, 1-20 chava-units/min, IntraVENous, Continuous, Sharan Garcia MD, Last Rate: 14 mL/hr at 02/18/24 0901, 14 chava-units/min at 02/18/24 0901    methylergonovine (METHERGINE) injection 200 mcg, 200 mcg, IntraMUSCular, PRN, Sharan Garcia MD    carboprost (HEMABATE) injection 250 mcg, 250 mcg, IntraMUSCular, PRN, Sharan Garcia MD    miSOPROStol (CYTOTEC)

## 2024-02-18 NOTE — PROGRESS NOTES
This RN educated patient on reducing epidural, patient states understanding and has no questions.  CRNA at bedside to reduce epidural

## 2024-02-18 NOTE — PROGRESS NOTES
This RN updated Dt Monet-Nando on patient arrival on 2-, Pitocin running at 12 currently, Pitocin shut off 0017 due to prolonged decelerations and restarted with reassuring fetal heart tones slowly.  aware covering Dr ordered for patient to have epidural when wanted.  aware of unknown GBS result, 4 doses of penicillin administered and GBS lab is sent out.  aware of SVE, FHM, contractions and patient has Subutex prescribed 8 mg at 0900 and 2100. No new orders

## 2024-02-18 NOTE — PROGRESS NOTES
RN at bedside, patient lying left side, turned to right side, pitocin turned off, Lr bolus started, US and toco readjusted

## 2024-02-18 NOTE — PROGRESS NOTES
Fellow RN updated Dr Garcia on patients SVE, prolonged decelerations, FSE placement and Pitocin shut off

## 2024-02-18 NOTE — PROGRESS NOTES
Patient requesting epidural. This RN called Dr Buckley to make aware of epidural request and started LR bolus.

## 2024-02-18 NOTE — PROGRESS NOTES
This RN called to update Dr Garcia on patient receiving epidural, variables, SVE, pitocin at 16 ml/hr, contractions Q 3-4 mins and FHM good variability with limited accelerations. This RN emailed Dr with previous FHM strips since 1000 for assessment, after Dr assessment no new orders.

## 2024-02-18 NOTE — PLAN OF CARE
Problem: Safety - Adult  Goal: Free from fall injury  Outcome: Progressing     Problem: Postpartum  Goal: Appropriate maternal -  bonding  Description:  Postpartum OB-Pregnancy care plan goal which identifies if the mother and  are bonding appropriately  Outcome: Progressing

## 2024-02-18 NOTE — PROGRESS NOTES
Dr Reagan updated on patient status, fetal tracing, contraction pattern, and most receent vaginal exam. Ok for epidural when patient chooses

## 2024-02-18 NOTE — PROGRESS NOTES
Dr Ramos in room for procedure at 1013  Patient sitting at side of bed for epidural insertion at 1019  Epidural catheter placed at 1032  Test dose given by Dr Ramos at 1033  Epidural bolus given by Dr Ramos at 1044  Patient returned to semi-lamb's position in bed at 1043  Epidural pump programmed to continuously infuse by Dr Ramos at 1049  Dr Ramos out of room at [id number]

## 2024-02-18 NOTE — PROGRESS NOTES
Dr Reagan notified of prolonged deceleration.  Orders to resatrt pitocin in 1 hour as long as fetal heart rate is reassuring.

## 2024-02-18 NOTE — PROGRESS NOTES
This RN attached EKG monitoring, continuous pulse ox, applied surgical hat onto patient and self for preporation of epidural. This RN also wearing mask for sterile procedure.

## 2024-02-18 NOTE — PROGRESS NOTES
Department of Anesthesiology  Continuous Labor Epidural Note - Periodic Monitoring Note    Name:  Kecia Tiwari                                         Age: 33 y.o.  MRN:  [id number]  Obstetrician:  No name on file    Last Pain Score: (Charted in Doc Flowsheet)  Pain Level: 0    /65   Pulse 74   Temp 36.4 °C (97.6 °F) (Oral)   Resp 16   Ht 1.702 m (5' 7\")   Wt 99.8 kg (220 lb)   LMP 03/25/2023 (Approximate)   SpO2 98%   BMI 34.46 kg/m²     A/P: Kecia Tiwari is a 33 y.o. year-old at 41w1d with continuous labor epidural and good pain control - Yes; RN notified CRNA of Dr. Del Castillo request to decrease epidural pump rate in half.  Epidural rate adjusted. Epidural rate now 7.5 ml/hr. Patient comfortable, vital signs stable.    JIM Helms - CRNA2/18/20245:53 PM   April

## 2024-02-19 LAB
HCT VFR BLD AUTO: 32.2 % (ref 34–48)
HCV AB SERPL QL IA: NONREACTIVE
HGB BLD-MCNC: 11.3 G/DL (ref 11.5–15.5)
RPR SER QL: NONREACTIVE

## 2024-02-19 PROCEDURE — 6370000000 HC RX 637 (ALT 250 FOR IP): Performed by: OBSTETRICS & GYNECOLOGY

## 2024-02-19 PROCEDURE — 85018 HEMOGLOBIN: CPT

## 2024-02-19 PROCEDURE — 1220000001 HC SEMI PRIVATE L&D R&B

## 2024-02-19 PROCEDURE — 86803 HEPATITIS C AB TEST: CPT

## 2024-02-19 PROCEDURE — 85014 HEMATOCRIT: CPT

## 2024-02-19 PROCEDURE — 6360000002 HC RX W HCPCS: Performed by: OBSTETRICS & GYNECOLOGY

## 2024-02-19 RX ORDER — IBUPROFEN 800 MG/1
800 TABLET ORAL EVERY 8 HOURS PRN
Qty: 120 TABLET | Refills: 3 | Status: SHIPPED | OUTPATIENT
Start: 2024-02-19

## 2024-02-19 RX ADMIN — BUPRENORPHINE 8 MG: 8 TABLET SUBLINGUAL at 08:29

## 2024-02-19 RX ADMIN — IBUPROFEN 800 MG: 800 TABLET, FILM COATED ORAL at 08:29

## 2024-02-19 RX ADMIN — ACETAMINOPHEN 1000 MG: 500 TABLET ORAL at 21:12

## 2024-02-19 RX ADMIN — BUPRENORPHINE 8 MG: 8 TABLET SUBLINGUAL at 21:14

## 2024-02-19 RX ADMIN — DOCUSATE SODIUM 100 MG: 100 CAPSULE, LIQUID FILLED ORAL at 21:12

## 2024-02-19 RX ADMIN — IBUPROFEN 800 MG: 800 TABLET, FILM COATED ORAL at 16:31

## 2024-02-19 RX ADMIN — DOCUSATE SODIUM 100 MG: 100 CAPSULE, LIQUID FILLED ORAL at 08:29

## 2024-02-19 ASSESSMENT — PAIN SCALES - GENERAL: PAINLEVEL_OUTOF10: 8

## 2024-02-19 NOTE — PROGRESS NOTES
[facility] [place] [facility] screening results were discussed with parent. Questions answered. Brochure given to parent. Advised to monitor developmental milestones and contact physician for any concerns.   Chantelle Bethea, AuD

## 2024-02-19 NOTE — DISCHARGE SUMMARY
Obstetrical Discharge Form         Patients Name  Kecia Tiwari    Gestational Age:  41w1d    Antepartum complications: post-term    Date of Delivery:   2024       7:35 PM      Type of Delivery:   Vaginal, Spontaneous [250]   Rupture Date/time:    2024      9:25 AM     Presentation:    Vertex [1]   Position:                      Anesthesia:    Epidural [254]     Feeding method:         Delivered By:   LORAINE-AAL, AMINE R     Baby:       Information for the patient's :  Roberto Parmar [[id number]]        Intrapartum complications: None  Postpartum complications: none  Discharge Date:   24  Discharge Condition: Stable  Disposition:   Home    Plan:   Follow up    in 6 weeks

## 2024-02-19 NOTE — PROGRESS NOTES
Department of Obstetrics and Gynecology  Labor and Delivery  Attending Post Partum Progress Note      SUBJECTIVE:  Doing well with no complaints  OBJECTIVE:      Vitals:  /67   Pulse 100   Temp 98.4 °F (36.9 °C) (Oral)   Resp 16   Ht 1.702 m (5' 7\")   Wt 99.8 kg (220 lb)   LMP 03/25/2023 (Approximate)   SpO2 98%   Breastfeeding Unknown   BMI 34.46 kg/m²     ABDOMEN:  Soft, well contracted uterus   Lochia: Normal  No calf tenderness    DATA:    CBC:    Lab Results   Component Value Date/Time    WBC 8.6 02/17/2024 06:10 PM    RBC 3.60 02/17/2024 06:10 PM    HGB 11.3 02/19/2024 04:50 AM    HCT 32.2 02/19/2024 04:50 AM    MCV 93.9 02/17/2024 06:10 PM    RDW 12.7 02/17/2024 06:10 PM     02/17/2024 06:10 PM       ASSESSMENT & PLAN:      PPD # 1    Continue current care.  Discharge home tomorrow.

## 2024-02-19 NOTE — CARE COORDINATION
2024: SS NOTE:  SS Consult noted regarding \"maternal drug use\", \"pt states she does not have custody of 4 or 5 children & pt on Subutex\". UDS on pt and  + for Buprenorphine.  Met with pt, Kecia, introduced self and role, Kecia was very pleasant and open to speaking with sw, she reports having a past history of Fentanyl abuse but has been involved with [facility] in [place] \"Mom's\" program, she plans to continue in the program and denies any illicit drug use during her pregnancy. She reports bonding with her her  son, Audrey Seth who was currently asleep in his basinet, no history or current feelings of PPD relayed and no other concerns reported by nursing staff who will be call agency to verify her Subutex dosage.  Kecia reports her 4 oldest children are in the care of their paternal grandmother but she does have visitation and she has custody of her youngest 2 year old child, Jimmy Seth, says they moved to [place], address- [address] [place] and now lives with her mother, Elsa Tiwari who she list as her support person for plan of care. She reports having all the necessary provisions to safely care for her  home and is planning to call for a WIC appointment, agency brochure provided.  [employer]- mandated  guide for plan of safe care assessment completed and sw will call report information into [facility] tomorrow  as per protocol, agency closed today for holiday. Anticipate baby will be held for 5 day drug withdrawal protocol, Nursing informed.Electronically signed by CHUCKY Costa on 2024 at 3:14 PM

## 2024-02-19 NOTE — ANESTHESIA POSTPROCEDURE EVALUATION
Department of Anesthesiology  Postprocedure Note    Patient: Kecia Tiwari  MRN: [id number]  YOB: 1990  Date of evaluation: 2/19/2024    Procedure Summary       Date: 02/18/24 Room / Location:     Anesthesia Start: 1010 Anesthesia Stop: 1935    Procedure: Labor Analgesia Diagnosis:     Scheduled Providers:  Responsible Provider: Danny Ramos MD    Anesthesia Type: epidural ASA Status: 3 - Emergent            Anesthesia Type: No value filed.    Dede Phase I: Dede Score: 10    Dede Phase II: Dede Score: 10    Anesthesia Post Evaluation    Patient location during evaluation: bedside  Patient participation: complete - patient participated  Level of consciousness: awake  Pain score: 0  Airway patency: patent  Nausea & Vomiting: no nausea and no vomiting  Cardiovascular status: hemodynamically stable  Respiratory status: acceptable  Hydration status: euvolemic  Pain management: adequate        No notable events documented.

## 2024-02-19 NOTE — L&D DELIVERY NOTE
Department of Obstetrics and Gynecology  Spontaneous Vaginal Delivery Note         Pre-operative Diagnosis:  Term pregnancy, Induced labor, Single fetus, and Pregnancy complicated by: Postdates    Post-operative Diagnosis:  Same + live male wt 7 #,5.5 oz  Procedure:  Spontaneous vaginal delivery    Surgeon:  Sharan Garcia MD    Anesthesia:  epidural anesthesia    Estimated blood loss: 200    Specimen:  Placenta not sent to pathology     Cord blood sent Yes    Complications:  none    Condition:  infant stable to general nursery and mother stable    Details of Procedure:   The patient is a 33 y.o. female at 41w1d   OB History          6    Para   5    Term   5            AB   0    Living   5         SAB   0    IAB        Ectopic        Molar        Multiple   0    Live Births   5          Obstetric Comments   4 children 9, 6, 5, 3            who was admitted for latent labor. She received the following interventions: ARBOW and IV Pitocin augmentation She was known to be GBS negative and did not receive antibiotic prophylaxis. The patient progressed well,did receive an epidural, became complete and started to push. After pushing for 1 hour the fetal head was at the perineum, nose and mouth suctioned with bulb suction and the rest of the infant delivered atraumatically, placed on mother abdomen.Cord was clamped and cut and infant handed off to the waiting nurse for evaluation. The delivery of the placenta was spontaneous. The perineum and vagina were explored and no tears or lacerations were noted.

## 2024-02-20 VITALS
OXYGEN SATURATION: 100 % | HEART RATE: 50 BPM | BODY MASS INDEX: 34.53 KG/M2 | SYSTOLIC BLOOD PRESSURE: 103 MMHG | WEIGHT: 220 LBS | HEIGHT: 67 IN | RESPIRATION RATE: 16 BRPM | TEMPERATURE: 98.2 F | DIASTOLIC BLOOD PRESSURE: 67 MMHG

## 2024-02-20 LAB
MICROORGANISM SPEC CULT: NORMAL
SPECIMEN DESCRIPTION: NORMAL

## 2024-02-20 PROCEDURE — 6360000002 HC RX W HCPCS: Performed by: OBSTETRICS & GYNECOLOGY

## 2024-02-20 PROCEDURE — 6370000000 HC RX 637 (ALT 250 FOR IP): Performed by: OBSTETRICS & GYNECOLOGY

## 2024-02-20 RX ADMIN — BUPRENORPHINE 8 MG: 8 TABLET SUBLINGUAL at 08:38

## 2024-02-20 RX ADMIN — DOCUSATE SODIUM 100 MG: 100 CAPSULE, LIQUID FILLED ORAL at 08:37

## 2024-02-20 RX ADMIN — IBUPROFEN 800 MG: 800 TABLET, FILM COATED ORAL at 08:37

## 2024-02-20 ASSESSMENT — PAIN DESCRIPTION - DESCRIPTORS: DESCRIPTORS: SHARP;SORE

## 2024-02-20 ASSESSMENT — PAIN - FUNCTIONAL ASSESSMENT: PAIN_FUNCTIONAL_ASSESSMENT: ACTIVITIES ARE NOT PREVENTED

## 2024-02-20 ASSESSMENT — PAIN SCALES - GENERAL: PAINLEVEL_OUTOF10: 8

## 2024-02-20 ASSESSMENT — PAIN DESCRIPTION - ORIENTATION: ORIENTATION: MID;LOWER

## 2024-02-20 ASSESSMENT — PAIN DESCRIPTION - LOCATION: LOCATION: BACK

## 2024-02-20 NOTE — DISCHARGE INSTRUCTIONS
Discharge Instructions for Labor and Delivery, Vaginal Birth   No tub bath for 6 weeks   You may take a shower   No driving for 2-3 weeks   No sexual activity,douching or tampons, No Work/School for 6 weeks   Walking and Stairs as tolerated. No heavy lifting     A vaginal birth refers to the baby being delivered through the vagina.   The amount of time that labor can take varies greatly. Labor for the average first-born baby is about 12 hours.   Usually your hospital stay after a routine delivery is no more than two nights. Some new mothers go home the same day. Recovery from childbirth varies depending upon whether you had an episiotomy (an incision in the perineum, the area between your vaginal opening and your anus), the duration of labor and delivery, and the amount of rest you get.   In general, it takes about 6-8 weeks for a woman's body to recover from childbirth.   What You Will Need   Along with your medications, you will need the following:   Sanitary pads    Nursing pads    Witch hazel pads    Sitz bath    Home Care    You will want to arrange for transportation home for you and your baby. The baby will need a car seat. You will receive instructions in the hospital for breastfeeding and taking care of the perineum area. You may use ointment for cracked nipples or warm water rinses to your perineum.   You will need to wear sanitary pads for about six weeks after delivery.    If you had an episiotomy or vaginal tear, you will be sent home with a plastic squirt bottle. Fill it with warm water and squirt over the vaginal and anal area every time you urinate and defecate.    Apply warm or cold cloths to sore breasts.    Apply ointment to cracked nipples.    Use nursing pads for leaky breast.    Apply witch hazel pads to sore perineum (area between vagina and anus).    Ask your doctor about when it is safe for you to shower or bathe.    Sit in a sitz bath to soothe sore perineum and/or hemorrhoids. A sitz

## 2024-02-20 NOTE — PROGRESS NOTES
Assumed care of patient for [id number] shift. Plan of care discussed and rest encouraged. Call light with in reach

## 2024-02-20 NOTE — PROGRESS NOTES
Assumed care of pt. Assessment and vitals completed. Pt with no needs at this time. POC discussed. Call light in reach.

## 2024-02-20 NOTE — PLAN OF CARE
Problem: Postpartum  Goal: Experiences normal postpartum course  Description:  Postpartum OB-Pregnancy care plan goal which identifies if the mother is experiencing a normal postpartum course  2024 by Samira Sethi RN  Outcome: Adequate for Discharge  2024 by Lindsay Panchal RN  Outcome: Progressing  Goal: Appropriate maternal -  bonding  Description:  Postpartum OB-Pregnancy care plan goal which identifies if the mother and  are bonding appropriately  2024 by Samira Sethi RN  Outcome: Adequate for Discharge  2024 by Lindsay Panchal RN  Outcome: Progressing  Goal: Establishment of infant feeding pattern  Description:  Postpartum OB-Pregnancy care plan goal which identifies if the mother is establishing a feeding pattern with their   2024 by Samira Sethi RN  Outcome: Adequate for Discharge  2024 by Lindsay Panchal RN  Outcome: Progressing  Goal: Incisions, wounds, or drain sites healing without S/S of infection  2024 by Samira Sethi RN  Outcome: Adequate for Discharge  2024 by Lindsay Panchal RN  Outcome: Progressing     Problem: Pain  Goal: Verbalizes/displays adequate comfort level or baseline comfort level  2024 by Samira Sethi RN  Outcome: Adequate for Discharge  2024 by Lindsay Panchal RN  Outcome: Progressing     Problem: Infection - Adult  Goal: Absence of infection at discharge  2024 by Samira Sethi RN  Outcome: Adequate for Discharge  Flowsheets (Taken 2024 0735)  Absence of infection at discharge: Assess and monitor for signs and symptoms of infection  2024 by Lindsay Panchal RN  Outcome: Progressing  Flowsheets (Taken 2024 0037)  Absence of infection at discharge: Assess and monitor for signs and symptoms of infection  Goal: Absence of infection during

## 2024-02-20 NOTE — CARE COORDINATION
2024: SS NOTE:  [employer] report called to Jael, Intake screener for [facility], will review with agency supervisor but anticipate referral being \"screened out\" with no ongoing agency services required and there is NO AGENCY HOLD on  and NO SAFETY PLAN NEEDED prior to baby's release, Nursing informed.Electronically signed by CHUCKY Costa on 2024 at 10:26 AM

## 2024-02-20 NOTE — PROGRESS NOTES
Teaching completed and discharge instructions given. Pt wants to go home to check on her other children. Will keep band on to be able to return for infant care.

## 2024-02-21 LAB
BUPRENORPHINE UR-MCNC: >1000 NG/ML
CHLAMYDIA DNA UR QL NAA+PROBE: NEGATIVE
COMPLIANCE DRUG ANALYSIS, URINE: NORMAL
N GONORRHOEA DNA UR QL NAA+PROBE: NEGATIVE
NORBUPRENORPHINE, QUANTITATIVE, URINE: >1000 NG/ML
SPECIMEN DESCRIPTION: NORMAL

## 2024-09-05 NOTE — PLAN OF CARE
Problem: Discharge Planning  Goal: Discharge to home or other facility with appropriate resources  Outcome: Progressing 66.2

## 2025-04-27 ENCOUNTER — HOSPITAL ENCOUNTER (EMERGENCY)
Age: 35
Discharge: HOME | End: 2025-04-27
Attending: EMERGENCY MEDICINE
Payer: MEDICAID

## 2025-04-27 VITALS
HEIGHT: 66 IN | RESPIRATION RATE: 16 BRPM | DIASTOLIC BLOOD PRESSURE: 75 MMHG | BODY MASS INDEX: 32.14 KG/M2 | TEMPERATURE: 98.8 F | SYSTOLIC BLOOD PRESSURE: 133 MMHG | WEIGHT: 200 LBS | HEART RATE: 94 BPM | OXYGEN SATURATION: 100 %

## 2025-04-27 DIAGNOSIS — Z76.0 ENCOUNTER FOR MEDICATION REFILL: Primary | ICD-10-CM

## 2025-04-27 LAB
AMPHET UR QL SCN: NEGATIVE
BARBITURATES UR QL SCN: NEGATIVE
BENZODIAZ UR QL: NEGATIVE
BUPRENORPHINE UR QL: POSITIVE
CANNABINOIDS UR QL SCN: NEGATIVE
COCAINE UR QL SCN: POSITIVE
FENTANYL UR QL: NEGATIVE
METHADONE UR QL: NEGATIVE
OPIATES UR QL SCN: NEGATIVE
OXYCODONE UR QL SCN: NEGATIVE
PCP UR QL SCN: NEGATIVE
TEST INFORMATION: ABNORMAL

## 2025-04-27 PROCEDURE — 80307 DRUG TEST PRSMV CHEM ANLYZR: CPT

## 2025-04-27 PROCEDURE — 6360000002 HC RX W HCPCS: Performed by: EMERGENCY MEDICINE

## 2025-04-27 PROCEDURE — 99283 EMERGENCY DEPT VISIT LOW MDM: CPT

## 2025-04-27 RX ORDER — BUPRENORPHINE HYDROCHLORIDE AND NALOXONE HYDROCHLORIDE DIHYDRATE 8; 2 MG/1; MG/1
1 TABLET SUBLINGUAL ONCE
Status: COMPLETED | OUTPATIENT
Start: 2025-04-27 | End: 2025-04-27

## 2025-04-27 RX ADMIN — BUPRENORPHINE HYDROCHLORIDE AND NALOXONE HYDROCHLORIDE DIHYDRATE 1 TABLET: 8; 2 TABLET SUBLINGUAL at 19:30

## 2025-04-27 NOTE — ED PROVIDER NOTES
occasionally words are mis-transcribed.)    Eileen Lugo DO (electronically signed)           Eileen Lugo DO  04/27/25 2105

## 2025-05-16 ENCOUNTER — HOSPITAL ENCOUNTER (EMERGENCY)
Age: 35
Discharge: HOME | End: 2025-05-16
Attending: STUDENT IN AN ORGANIZED HEALTH CARE EDUCATION/TRAINING PROGRAM
Payer: MEDICAID

## 2025-05-16 VITALS
TEMPERATURE: 98.3 F | RESPIRATION RATE: 20 BRPM | SYSTOLIC BLOOD PRESSURE: 126 MMHG | OXYGEN SATURATION: 98 % | WEIGHT: 200 LBS | DIASTOLIC BLOOD PRESSURE: 72 MMHG | HEART RATE: 104 BPM | BODY MASS INDEX: 32.28 KG/M2

## 2025-05-16 DIAGNOSIS — Z76.0 ENCOUNTER FOR MEDICATION REFILL: Primary | ICD-10-CM

## 2025-05-16 PROCEDURE — 99283 EMERGENCY DEPT VISIT LOW MDM: CPT

## 2025-05-16 PROCEDURE — 6360000002 HC RX W HCPCS

## 2025-05-16 RX ORDER — BUPRENORPHINE HYDROCHLORIDE AND NALOXONE HYDROCHLORIDE DIHYDRATE 8; 2 MG/1; MG/1
1 TABLET SUBLINGUAL DAILY
Status: DISCONTINUED | OUTPATIENT
Start: 2025-05-16 | End: 2025-05-16 | Stop reason: HOSPADM

## 2025-05-16 RX ADMIN — BUPRENORPHINE HYDROCHLORIDE AND NALOXONE HYDROCHLORIDE DIHYDRATE 1 TABLET: 8; 2 TABLET SUBLINGUAL at 17:46

## 2025-05-16 NOTE — ED PROVIDER NOTES
[facility] EMERGENCY DEPARTMENT  EMERGENCY DEPARTMENT ENCOUNTER        Pt Name: Kecia Tiwari  MRN: [id number]  Birthdate 1990  Date of evaluation: 5/16/2025  Provider: Romero Montejo MD  PCP: No primary care provider on file.  Note Started: 4:06 PM EDT 5/16/25    CHIEF COMPLAINT & HISTORY     Chief Complaint   Patient presents with    Medication Refill     Missed appt for suboxone refill         History From: pt  Kecia Tiwari is a 34 y.o. female w/pmhx of opioid use for which she takes suboxone presents for refill. She has been on Suboxone since October 2023 and reports that she has been very consistent with taking her Suboxone and has not used any illicit substances since.  [facility] report seems to suggest that she is very consistent with fulfilling her medications.  She reports that she gets them now at [facility].  She ran out last night, with her last dose being at 7 PM.  She takes 8 mg in the morning, 8 mg in the afternoon, and 8 mg at night..  She reports trying to get to the facility today, however they were only open till 1 PM and unfortunately her ride canceled at the last moment.  She is now asking for a single dose of Suboxone to hold her over until the morning, as the facility opens at 5 AM and she can get there early in the morning for more Suboxone.  She says she is otherwise feeling well, she just does not want to be tempted to use.  She says she is feeling slightly anxious.  She denies any significant medical history and says she is otherwise doing well.  She denies any nausea, vomiting, fevers, chills, headaches, upset stomach, dizziness, diarrhea or constipation or chest pain.      Unless otherwise noted in HPI above, patient denies fever, chills, headache, shortness of breath, chest pain, abdominal pain, nausea, vomiting, diarrhea, lightheadedness, dysuria, hematuria, hematochezia, and melena.    Medical Decision

## 2025-05-16 NOTE — DISCHARGE INSTRUCTIONS
https://naloxone.[place].gov/  A website through which you can obtain free Narcan    https://www.narcotics.com/na-meetings/[place]/  Narcotics Anonymous for the [facility]    We encourage you to follow-up with our family medicine clinic.  =================      Almost any medicine can cause harm if you take too much of it. You have had treatment to help your body get rid of an overdose of a medicine. This may have been an over-the-counter medicine. Or it might be one that a doctor prescribed. It may even have been a vitamin or a supplement.    During treatment, the doctor may have given you fluids and medicine. You also may have had lab tests. Then the doctor made sure that you were well enough to go home.    The doctor has checked you carefully, but problems can develop later. If you notice any problems or new symptoms, get medical treatment right away.    Follow-up care is a key part of your treatment and safety. Be sure to make and go to all appointments, and call your doctor if you are having problems. It's also a good idea to know your test results and keep a list of the medicines you take.    How can you care for yourself at home?  Home care  Talk with your doctor about what you should eat or drink.  If you normally take medicines, ask your doctor when you can start taking them again.  Read the information that comes with any medicine. If you have questions, ask your doctor or pharmacist.  Prevention  Be safe with medicines. Take your medicines exactly as prescribed or as the label directs. Call your doctor if you think you are having a problem with your medicine.  Keep your medicines in the containers they came in. Keep them with the original labels.  Find out what to do if you miss a dose of your medicine.  When should you call for help?     Poison control centers, hospitals, or your doctor can give immediate advice in the case of a poisoning. The [place] National Poison Control Hotline phone

## 2025-06-22 ENCOUNTER — HOSPITAL ENCOUNTER (EMERGENCY)
Age: 35
Discharge: HOME | End: 2025-06-22
Attending: EMERGENCY MEDICINE
Payer: MEDICAID

## 2025-06-22 ENCOUNTER — APPOINTMENT (OUTPATIENT)
Dept: CT IMAGING | Age: 35
End: 2025-06-22
Payer: MEDICAID

## 2025-06-22 ENCOUNTER — APPOINTMENT (OUTPATIENT)
Dept: GENERAL RADIOLOGY | Age: 35
End: 2025-06-22
Payer: MEDICAID

## 2025-06-22 VITALS
BODY MASS INDEX: 41.97 KG/M2 | HEART RATE: 69 BPM | SYSTOLIC BLOOD PRESSURE: 116 MMHG | OXYGEN SATURATION: 100 % | TEMPERATURE: 98.3 F | DIASTOLIC BLOOD PRESSURE: 71 MMHG | RESPIRATION RATE: 22 BRPM | WEIGHT: 260 LBS

## 2025-06-22 DIAGNOSIS — K59.00 CONSTIPATION, UNSPECIFIED CONSTIPATION TYPE: ICD-10-CM

## 2025-06-22 DIAGNOSIS — R10.13 ABDOMINAL PAIN, EPIGASTRIC: Primary | ICD-10-CM

## 2025-06-22 LAB
ALBUMIN SERPL-MCNC: 4 G/DL (ref 3.5–5.2)
ALP SERPL-CCNC: 101 U/L (ref 35–104)
ALT SERPL-CCNC: 13 U/L (ref 0–35)
ANION GAP SERPL CALCULATED.3IONS-SCNC: 12 MMOL/L (ref 7–16)
AST SERPL-CCNC: 18 U/L (ref 0–35)
BACTERIA URNS QL MICRO: ABNORMAL
BASOPHILS # BLD: 0.06 K/UL (ref 0–0.2)
BASOPHILS NFR BLD: 1 % (ref 0–2)
BILIRUB SERPL-MCNC: 0.3 MG/DL (ref 0–1.2)
BILIRUB UR QL STRIP: NEGATIVE
BUN SERPL-MCNC: 12 MG/DL (ref 6–20)
CALCIUM SERPL-MCNC: 9.2 MG/DL (ref 8.6–10)
CHLORIDE SERPL-SCNC: 102 MMOL/L (ref 98–107)
CLARITY UR: CLEAR
CO2 SERPL-SCNC: 24 MMOL/L (ref 22–29)
COLOR UR: YELLOW
CREAT SERPL-MCNC: 0.8 MG/DL (ref 0.5–1)
D-DIMER QUANTITATIVE: <200 NG/ML DDU (ref 0–230)
EKG ATRIAL RATE: 69 BPM
EKG P AXIS: 49 DEGREES
EKG P-R INTERVAL: 196 MS
EKG Q-T INTERVAL: 404 MS
EKG QRS DURATION: 80 MS
EKG QTC CALCULATION (BAZETT): 432 MS
EKG R AXIS: 79 DEGREES
EKG T AXIS: 49 DEGREES
EKG VENTRICULAR RATE: 69 BPM
EOSINOPHIL # BLD: 0.32 K/UL (ref 0.05–0.5)
EOSINOPHILS RELATIVE PERCENT: 4 % (ref 0–6)
ERYTHROCYTE [DISTWIDTH] IN BLOOD BY AUTOMATED COUNT: 13.3 % (ref 11.5–15)
GFR, ESTIMATED: >90 ML/MIN/1.73M2
GLUCOSE SERPL-MCNC: 92 MG/DL (ref 74–99)
GLUCOSE UR STRIP-MCNC: NEGATIVE MG/DL
HCG, URINE, POC: NEGATIVE
HCT VFR BLD AUTO: 34.4 % (ref 34–48)
HGB BLD-MCNC: 11.5 G/DL (ref 11.5–15.5)
HGB UR QL STRIP.AUTO: NEGATIVE
IMM GRANULOCYTES # BLD AUTO: 0.03 K/UL (ref 0–0.58)
IMM GRANULOCYTES NFR BLD: 0 % (ref 0–5)
KETONES UR STRIP-MCNC: NEGATIVE MG/DL
LACTATE BLDV-SCNC: 1.7 MMOL/L (ref 0.5–2.2)
LEUKOCYTE ESTERASE UR QL STRIP: NEGATIVE
LIPASE SERPL-CCNC: 25 U/L (ref 13–60)
LYMPHOCYTES NFR BLD: 2.69 K/UL (ref 1.5–4)
LYMPHOCYTES RELATIVE PERCENT: 30 % (ref 20–42)
Lab: NORMAL
MCH RBC QN AUTO: 30.3 PG (ref 26–35)
MCHC RBC AUTO-ENTMCNC: 33.4 G/DL (ref 32–34.5)
MCV RBC AUTO: 90.5 FL (ref 80–99.9)
MONOCYTES NFR BLD: 0.62 K/UL (ref 0.1–0.95)
MONOCYTES NFR BLD: 7 % (ref 2–12)
NEGATIVE QC PASS/FAIL: NORMAL
NEUTROPHILS NFR BLD: 59 % (ref 43–80)
NEUTS SEG NFR BLD: 5.41 K/UL (ref 1.8–7.3)
NITRITE UR QL STRIP: NEGATIVE
PH UR STRIP: 6 [PH] (ref 5–8)
PLATELET # BLD AUTO: 372 K/UL (ref 130–450)
PMV BLD AUTO: 9.1 FL (ref 7–12)
POSITIVE QC PASS/FAIL: NORMAL
POTASSIUM SERPL-SCNC: 3.6 MMOL/L (ref 3.5–5.1)
PROT SERPL-MCNC: 8 G/DL (ref 6.4–8.3)
PROT UR STRIP-MCNC: NEGATIVE MG/DL
RBC # BLD AUTO: 3.8 M/UL (ref 3.5–5.5)
RBC #/AREA URNS HPF: ABNORMAL /HPF
SODIUM SERPL-SCNC: 137 MMOL/L (ref 136–145)
SP GR UR STRIP: 1.02 (ref 1–1.03)
TROPONIN I SERPL HS-MCNC: <6 NG/L (ref 0–14)
UROBILINOGEN UR STRIP-ACNC: 1 EU/DL (ref 0–1)
WBC #/AREA URNS HPF: ABNORMAL /HPF
WBC OTHER # BLD: 9.1 K/UL (ref 4.5–11.5)

## 2025-06-22 PROCEDURE — 85379 FIBRIN DEGRADATION QUANT: CPT

## 2025-06-22 PROCEDURE — 71046 X-RAY EXAM CHEST 2 VIEWS: CPT

## 2025-06-22 PROCEDURE — 6360000004 HC RX CONTRAST MEDICATION: Performed by: RADIOLOGY

## 2025-06-22 PROCEDURE — 6370000000 HC RX 637 (ALT 250 FOR IP): Performed by: NURSE PRACTITIONER

## 2025-06-22 PROCEDURE — 6360000002 HC RX W HCPCS: Performed by: NURSE PRACTITIONER

## 2025-06-22 PROCEDURE — 83605 ASSAY OF LACTIC ACID: CPT

## 2025-06-22 PROCEDURE — 87077 CULTURE AEROBIC IDENTIFY: CPT

## 2025-06-22 PROCEDURE — 85025 COMPLETE CBC W/AUTO DIFF WBC: CPT

## 2025-06-22 PROCEDURE — 80053 COMPREHEN METABOLIC PANEL: CPT

## 2025-06-22 PROCEDURE — 81001 URINALYSIS AUTO W/SCOPE: CPT

## 2025-06-22 PROCEDURE — 83690 ASSAY OF LIPASE: CPT

## 2025-06-22 PROCEDURE — 96375 TX/PRO/DX INJ NEW DRUG ADDON: CPT

## 2025-06-22 PROCEDURE — 74177 CT ABD & PELVIS W/CONTRAST: CPT

## 2025-06-22 PROCEDURE — 99285 EMERGENCY DEPT VISIT HI MDM: CPT

## 2025-06-22 PROCEDURE — 93005 ELECTROCARDIOGRAM TRACING: CPT | Performed by: NURSE PRACTITIONER

## 2025-06-22 PROCEDURE — 87086 URINE CULTURE/COLONY COUNT: CPT

## 2025-06-22 PROCEDURE — 96374 THER/PROPH/DIAG INJ IV PUSH: CPT

## 2025-06-22 PROCEDURE — 84484 ASSAY OF TROPONIN QUANT: CPT

## 2025-06-22 PROCEDURE — 93010 ELECTROCARDIOGRAM REPORT: CPT | Performed by: INTERNAL MEDICINE

## 2025-06-22 RX ORDER — FENTANYL CITRATE 0.05 MG/ML
25 INJECTION, SOLUTION INTRAMUSCULAR; INTRAVENOUS ONCE
Refills: 0 | Status: COMPLETED | OUTPATIENT
Start: 2025-06-22 | End: 2025-06-22

## 2025-06-22 RX ORDER — IOPAMIDOL 755 MG/ML
70 INJECTION, SOLUTION INTRAVASCULAR
Status: COMPLETED | OUTPATIENT
Start: 2025-06-22 | End: 2025-06-22

## 2025-06-22 RX ORDER — ONDANSETRON 4 MG/1
4 TABLET, FILM COATED ORAL EVERY 8 HOURS PRN
Qty: 12 TABLET | Refills: 0 | Status: SHIPPED | OUTPATIENT
Start: 2025-06-22 | End: 2025-06-27

## 2025-06-22 RX ORDER — PANTOPRAZOLE SODIUM 40 MG/1
40 TABLET, DELAYED RELEASE ORAL
Qty: 30 TABLET | Refills: 0 | Status: SHIPPED | OUTPATIENT
Start: 2025-06-22

## 2025-06-22 RX ORDER — POLYETHYLENE GLYCOL 3350 17 G/17G
17 POWDER, FOR SOLUTION ORAL DAILY
Qty: 510 G | Refills: 0 | Status: SHIPPED | OUTPATIENT
Start: 2025-06-22 | End: 2025-07-22

## 2025-06-22 RX ORDER — ONDANSETRON 2 MG/ML
4 INJECTION INTRAMUSCULAR; INTRAVENOUS ONCE
Status: COMPLETED | OUTPATIENT
Start: 2025-06-22 | End: 2025-06-22

## 2025-06-22 RX ADMIN — ONDANSETRON 4 MG: 2 INJECTION, SOLUTION INTRAMUSCULAR; INTRAVENOUS at 11:17

## 2025-06-22 RX ADMIN — FENTANYL CITRATE 25 MCG: 0.05 INJECTION, SOLUTION INTRAMUSCULAR; INTRAVENOUS at 11:19

## 2025-06-22 RX ADMIN — LIDOCAINE HYDROCHLORIDE: 20 SOLUTION ORAL at 11:20

## 2025-06-22 RX ADMIN — IOPAMIDOL 70 ML: 755 INJECTION, SOLUTION INTRAVENOUS at 13:55

## 2025-06-22 ASSESSMENT — PAIN DESCRIPTION - ORIENTATION
ORIENTATION: MID
ORIENTATION: UPPER
ORIENTATION: LOWER;MID

## 2025-06-22 ASSESSMENT — PAIN - FUNCTIONAL ASSESSMENT
PAIN_FUNCTIONAL_ASSESSMENT: 0-10
PAIN_FUNCTIONAL_ASSESSMENT: 0-10

## 2025-06-22 ASSESSMENT — PAIN SCALES - GENERAL
PAINLEVEL_OUTOF10: 8
PAINLEVEL_OUTOF10: 9

## 2025-06-22 ASSESSMENT — PAIN DESCRIPTION - DESCRIPTORS
DESCRIPTORS: SHARP
DESCRIPTORS: BURNING

## 2025-06-22 ASSESSMENT — PAIN DESCRIPTION - LOCATION
LOCATION: ABDOMEN;CHEST
LOCATION: ABDOMEN;CHEST
LOCATION: ABDOMEN

## 2025-06-22 ASSESSMENT — PAIN DESCRIPTION - PAIN TYPE
TYPE: ACUTE PAIN
TYPE: ACUTE PAIN

## 2025-06-22 ASSESSMENT — PAIN DESCRIPTION - FREQUENCY: FREQUENCY: CONTINUOUS

## 2025-06-22 ASSESSMENT — LIFESTYLE VARIABLES: HOW MANY STANDARD DRINKS CONTAINING ALCOHOL DO YOU HAVE ON A TYPICAL DAY: PATIENT DOES NOT DRINK

## 2025-06-22 NOTE — ED PROVIDER NOTES
Independent CARLOS Visit.     [facility] EMERGENCY DEPARTMENT  EMERGENCY DEPARTMENT ENCOUNTER      Pt Name: Kecia Tiwari  MRN: [id number]  Birthdate 1990  Date of evaluation: 2025  Provider: JIM Ferrera - CNP  PCP: No primary care provider on file.  Note Started: 10:34 AM EDT 25    CHIEF COMPLAINT       Chief Complaint   Patient presents with    Chest Pain     Upper mid epigastric into chest xm 30m min/ denies eating/ nauseated       HISTORY OF PRESENT ILLNESS: 1 or more Elements   History From: Patient  Limitations to history : None    Kecia Tiwari is a 34 y.o. female who has a past medical history of anemia, drug abuse, obesity, overdose, polysubstance abuse presents to the emergency department with epigastric abdominal pain radiating to the chest onset approximate 30 minutes prior to arrival.  Reports that she woke up about 5:00 this morning and ate a steak and potatoes leftover from last night.  States she went back to sleep and woke up with the pain 30 minutes prior to arrival.  She has had no fevers, nausea no vomiting, no diarrhea.  No history of abdominal surgeries, denies any current drug or alcohol use.  States the pain is constant, the pain seems to start in epigastric region and radiates to her chest.  Denies any diaphoresis or shortness of breath.    Nursing Notes were all reviewed and agreed with or any disagreements were addressed in the HPI.    REVIEW OF SYSTEMS :    Positives and Pertinent negatives as per HPI.     PAST MEDICAL HISTORY/Chronic Conditions Affecting Care    has a past medical history of Abnormal Pap smear of cervix, Anemia, Drug abuse (HCC), Obesity, Overdose, Preeclampsia complicating hypertension (2021), and  (spontaneous vaginal delivery) (2024).     SURGICAL HISTORY     Past Surgical History:   Procedure Laterality Date    ANKLE FRACTURE SURGERY Right 2023    RIGHT ANKLE ORIF VS EXTERNAL FIXATION,

## 2025-06-22 NOTE — DISCHARGE INSTRUCTIONS
CT ABDOMEN PELVIS W IV CONTRAST Additional Contrast? None   Final Result   1. No acute intra-abdominal or pelvic process.   2. Moderate volume of retained fecal debris within the colon.         XR CHEST (2 VW)   Final Result   No acute process.

## 2025-06-24 ENCOUNTER — RESULTS FOLLOW-UP (OUTPATIENT)
Dept: PHARMACY | Age: 35
End: 2025-06-24

## 2025-06-24 LAB
MICROORGANISM SPEC CULT: ABNORMAL
SERVICE CMNT-IMP: ABNORMAL
SPECIMEN DESCRIPTION: ABNORMAL

## (undated) DEVICE — DRAPE 54X4IN 2 PLY PREROLL WATER RESIST

## (undated) DEVICE — PADDING,UNDERCAST,COTTON, 4"X4YD STERILE: Brand: MEDLINE

## (undated) DEVICE — BIT DRL L200MM DIA2.8MM CALIB L100MM FOR 3.5MM VA LCP PROX

## (undated) DEVICE — TUBING SUCT 12FR MAL ALUM SHFT FN CAP VENT UNIV CONN W/ OBT

## (undated) DEVICE — GLOVE ORANGE PI 8 1/2   MSG9085

## (undated) DEVICE — SOLUTION IRRIG 1500ML 0.9% SOD CHL USP POUR PLAS BTL

## (undated) DEVICE — INTENDED FOR TISSUE SEPARATION, AND OTHER PROCEDURES THAT REQUIRE A SHARP SURGICAL BLADE TO PUNCTURE OR CUT.: Brand: BARD-PARKER ® STAINLESS STEEL BLADES

## (undated) DEVICE — BIT DRL L125MM DIA2MM QUIK CPL W/O STP REUSE

## (undated) DEVICE — BIT DRL L110MM DIA2.5MM G QUIK CPL W/O STP REUSE

## (undated) DEVICE — BANDAGE COMPR W4INXL10YD WHITE/BEIGE E MTRX HK LOOP CLSR

## (undated) DEVICE — MEDI-VAC YANKAUER SUCTION HANDLE: Brand: CARDINAL HEALTH

## (undated) DEVICE — SHEET,DRAPE,40X58,STERILE: Brand: MEDLINE

## (undated) DEVICE — DOUBLE BASIN SET: Brand: MEDLINE INDUSTRIES, INC.

## (undated) DEVICE — TUBING, SUCTION, 1/4" X 10', STRAIGHT: Brand: MEDLINE

## (undated) DEVICE — LOWER EXT KNEE DRAPE: Brand: MEDLINE INDUSTRIES, INC.

## (undated) DEVICE — SET EXTN L14IN 1ML IV L BOR NO FLTR NO STPCOCK

## (undated) DEVICE — ELECTRODE PT RET AD L9FT HI MOIST COND ADH HYDRGEL CORDED

## (undated) DEVICE — PATIENT RETURN ELECTRODE, SINGLE-USE, CONTACT QUALITY MONITORING, ADULT, WITH 9FT CORD, FOR PATIENTS WEIGING OVER 33LBS. (15KG): Brand: MEGADYNE

## (undated) DEVICE — SET SURG INSTR DISSECT

## (undated) DEVICE — SHEET SUPPORT

## (undated) DEVICE — COVER,LIGHT HANDLE,FLX,1/PK: Brand: MEDLINE INDUSTRIES, INC.

## (undated) DEVICE — BIT DRILL 125MM QUICK COUPL 3 315.28S

## (undated) DEVICE — NDL CNTR 40CT FM MAG: Brand: MEDLINE INDUSTRIES, INC.

## (undated) DEVICE — Z DISCONTINUED USE 2275686 GLOVE SURG SZ 8 L12IN FNGR THK13MIL WHT ISOLEX POLYISOPRENE

## (undated) DEVICE — GAUZE,SPONGE,4"X4",16PLY,XRAY,STRL,LF: Brand: MEDLINE

## (undated) DEVICE — DRESSING,GAUZE,XEROFORM,CURAD,5"X9",ST: Brand: CURAD

## (undated) DEVICE — GLOVE SURG SZ 85 L12IN FNGR ORTHO 126MIL CRM LTX FREE

## (undated) DEVICE — HANDPIECE SET WITH BONE CLEANING TIP: Brand: INTERPULSE

## (undated) DEVICE — SYRINGE IRRIG 60ML SFT PLIABLE BLB EZ TO GRP 1 HND USE W/

## (undated) DEVICE — ELECTROSURGICAL PENCIL BUTTON SWITCH E-Z CLEAN COATED BLADE ELECTRODE 10 FT (3 M) CORD HOLSTER: Brand: MEGADYNE

## (undated) DEVICE — TOWEL,OR,DSP,ST,BLUE,STD,6/PK,12PK/CS: Brand: MEDLINE

## (undated) DEVICE — BIT DRL L125MM DIA2.7MM QUIK CPL 3 FLUT

## (undated) DEVICE — MARKER,SKIN,WI/RULER AND LABELS: Brand: MEDLINE

## (undated) DEVICE — GOWN,SIRUS,FABRNF,XL,20/CS: Brand: MEDLINE

## (undated) DEVICE — Device

## (undated) DEVICE — C-ARMOR C-ARM EQUIPMENT COVERS CLEAR STERILE UNIVERSAL FIT 12 PER CASE: Brand: C-ARMOR

## (undated) DEVICE — GOWN,SIRUS,POLYRNF,BRTHSLV,XLN/XL,20/CS: Brand: MEDLINE

## (undated) DEVICE — BIT DRL L110MM DIA2.5MM ST G QUIK CPL NONRADIOPAQUE W/O STP